# Patient Record
Sex: FEMALE | Race: WHITE | Employment: OTHER | URBAN - METROPOLITAN AREA
[De-identification: names, ages, dates, MRNs, and addresses within clinical notes are randomized per-mention and may not be internally consistent; named-entity substitution may affect disease eponyms.]

---

## 2017-05-11 ENCOUNTER — ED HISTORICAL/CONVERTED ENCOUNTER (OUTPATIENT)
Dept: OTHER | Age: 82
End: 2017-05-11

## 2017-11-15 ENCOUNTER — OP HISTORICAL/CONVERTED ENCOUNTER (OUTPATIENT)
Dept: OTHER | Age: 82
End: 2017-11-15

## 2018-07-03 ENCOUNTER — OP HISTORICAL/CONVERTED ENCOUNTER (OUTPATIENT)
Dept: OTHER | Age: 83
End: 2018-07-03

## 2018-12-29 ENCOUNTER — ED HISTORICAL/CONVERTED ENCOUNTER (OUTPATIENT)
Dept: OTHER | Age: 83
End: 2018-12-29

## 2019-02-07 ENCOUNTER — OP HISTORICAL/CONVERTED ENCOUNTER (OUTPATIENT)
Dept: OTHER | Age: 84
End: 2019-02-07

## 2019-07-17 ENCOUNTER — ED HISTORICAL/CONVERTED ENCOUNTER (OUTPATIENT)
Dept: OTHER | Age: 84
End: 2019-07-17

## 2019-08-09 ENCOUNTER — IP HISTORICAL/CONVERTED ENCOUNTER (OUTPATIENT)
Dept: OTHER | Age: 84
End: 2019-08-09

## 2019-09-03 ENCOUNTER — OP HISTORICAL/CONVERTED ENCOUNTER (OUTPATIENT)
Dept: OTHER | Age: 84
End: 2019-09-03

## 2019-12-29 ENCOUNTER — ED HISTORICAL/CONVERTED ENCOUNTER (OUTPATIENT)
Dept: OTHER | Age: 84
End: 2019-12-29

## 2020-11-19 ENCOUNTER — HOSPITAL ENCOUNTER (OUTPATIENT)
Dept: WOUND CARE | Age: 85
Discharge: HOME | End: 2020-11-19
Payer: MEDICARE

## 2020-11-19 VITALS
SYSTOLIC BLOOD PRESSURE: 121 MMHG | RESPIRATION RATE: 18 BRPM | WEIGHT: 110 LBS | BODY MASS INDEX: 21.6 KG/M2 | DIASTOLIC BLOOD PRESSURE: 56 MMHG | HEIGHT: 60 IN | TEMPERATURE: 98.3 F | HEART RATE: 72 BPM

## 2020-11-19 PROBLEM — S61.401A OPEN WOUND OF RIGHT HAND: Status: ACTIVE | Noted: 2020-11-19

## 2020-11-19 PROBLEM — S61.250A: Status: ACTIVE | Noted: 2020-11-19

## 2020-11-19 PROBLEM — I10 BENIGN ESSENTIAL HYPERTENSION: Status: ACTIVE | Noted: 2020-11-19

## 2020-11-19 PROCEDURE — 11042 DBRDMT SUBQ TIS 1ST 20SQCM/<: CPT

## 2020-11-19 PROCEDURE — 99204 OFFICE O/P NEW MOD 45 MIN: CPT

## 2020-11-19 RX ORDER — ASPIRIN 325 MG
325 TABLET ORAL DAILY
COMMUNITY

## 2020-11-19 RX ORDER — AMLODIPINE BESYLATE 5 MG/1
5 TABLET ORAL DAILY
COMMUNITY
End: 2021-06-14

## 2020-11-19 RX ORDER — NAPROXEN SODIUM 220 MG
220 TABLET ORAL 2 TIMES DAILY WITH MEALS
COMMUNITY
End: 2021-05-22

## 2020-11-19 RX ORDER — LIDOCAINE HYDROCHLORIDE 20 MG/ML
15 SOLUTION OROPHARYNGEAL AS NEEDED
Status: DISCONTINUED | OUTPATIENT
Start: 2020-11-19 | End: 2020-11-21 | Stop reason: HOSPADM

## 2020-11-19 NOTE — PROGRESS NOTES
[address]   Progress Note and Procedure Note     [address] [facility]  RECORD NUMBER:  [id number]  AGE: 80 y.o. RACE WHITE OR   GENDER: female  : 1935  EPISODE DATE:  2020    Collaborating Physician: Sierra Palomo MD     Subjective: Patient states she was trying to  the food on the floor that had spilled and her dog bit her. She states she went to [facility] and had surgery by Dr. Nicolasa Goodell about 3 weeks ago. She states she has been improving. She has no other complaints at this time. Chief Complaint   Patient presents with    Wound Check     Right 2nd finger         HISTORY of PRESENT ILLNESS HPI    Dara Goodwin is a 80 y.o. female who presents today for wound/ulcer evaluation. History of Wound Context: dog bit of the right index finger  Wound/Ulcer Pain Timing/Severity: none  Quality of pain: none  Severity:  0 / 10   Modifying Factors: None  Associated Signs/Symptoms: stiffness    Ulcer Identification:  Ulcer Type: traumatic    Contributing Factors: none    Wound: to the right index finger         PAST MEDICAL HISTORY    Past Medical History:   Diagnosis Date    Arthritis     GERD (gastroesophageal reflux disease)     Hypertension     Other ill-defined conditions(799.89)     Diverticulitis    Psychiatric disorder     depression        PAST SURGICAL HISTORY    Past Surgical History:   Procedure Laterality Date    HX GI  2010    Colon resection    HX GYN  1973    Hysterectomy    HX HEENT  2013    Laser eye surgery on left    HX ORTHOPAEDIC  1995    Left rotator cuff surgery    HX ORTHOPAEDIC      right 2nd finger       FAMILY HISTORY    Family History   Problem Relation Age of Onset    Lung Disease Mother     Diabetes Sister     Heart Disease Sister        SOCIAL HISTORY    Social History     Tobacco Use    Smoking status: Former Smoker    Smokeless tobacco: Former User   Substance Use Topics    Alcohol use: No    Drug use:  No ALLERGIES    Allergies   Allergen Reactions    Penicillin G Anaphylaxis    Codeine Other (comments)     I can't walk and don't have control over my body       MEDICATIONS    Current Outpatient Medications on File Prior to Encounter   Medication Sig Dispense Refill    amLODIPine (NORVASC) 5 mg tablet Take 5 mg by mouth daily.  naproxen sodium (Aleve) 220 mg tablet Take 220 mg by mouth two (2) times daily (with meals).  aspirin (aspirin) 325 mg tablet Take 325 mg by mouth daily.  traZODone (DESYREL) 100 mg tablet Take 100 mg by mouth nightly.  cholecalciferol, vitamin D3, (VITAMIN D3) 2,000 unit Tab Take 1 Tab by mouth daily. No current facility-administered medications on file prior to encounter. REVIEW OF SYSTEMS    Pertinent items are noted in HPI. Objective:     Visit Vitals  BP (!) 121/56 (BP 1 Location: Left arm, BP Patient Position: At rest;Sitting)   Pulse 72   Temp 98.3 °F (36.8 °C)   Resp 18   Ht 5' (1.524 m)   Wt 49.9 kg (110 lb)   BMI 21.48 kg/m²       Wt Readings from Last 3 Encounters:   11/19/20 49.9 kg (110 lb)   03/31/15 68.9 kg (152 lb)   05/01/13 70.3 kg (155 lb)       PHYSICAL EXAM    Constitutional: Patient is awake, ambulating with no devices, no acute distress  Head: normocephalic, atraumatic. Musculoskeletal: swelling and stiffness noted to the right index finger. Limited DIP and PIP flexion. Wound: to the dorsal aspect of the right index finger with minimal slough no drainage, no odor, mild periwoud maceration. Sutures in place from surgical intervention.   Behavioral/Psych: patient is pleasant, cooperative, awake and alert    Assessment:        Problem List Items Addressed This Visit     None          Debridement Wound Care        Problem List Items Addressed This Visit     None          Procedure Note  Indications:  Based on my examination of this patient's wound(s)/ulcer(s) today, debridement is required to promote healing and evaluate the wound base. Performed by: Soni Ortiz PA-C    Consent obtained: Yes    Time out taken: Yes    Debridement: Excisional    Using curette and forceps the wound(s)/ulcer(s) was/were sharply debrided down through and including the removal of    epidermis, dermis and subcutaneous tissue    Devitalized Tissue Debrided: fibrin, biofilm and slough    Pre Debridement Measurements:  Are located in the Wound/Ulcer Documentation Flow Sheet    Wound/Ulcer #: 1    Post Debridement Measurements:  Wound/Ulcer Descriptions are Pre Debridement except measurements:Non-Pressure ulcer, fat layer exposed    Wound Finger (Comment which one) Right #1 11/19/20 (Active)   Wound Etiology Traumatic 11/19/20 1326   Dressing Status Clean;Dry; Intact 11/19/20 1326   Cleansed Cleansed with saline 11/19/20 1326   Wound Length (cm) 0.3 cm 11/19/20 1326   Wound Width (cm) 0.3 cm 11/19/20 1326   Wound Depth (cm) 0.1 cm 11/19/20 1326   Wound Surface Area (cm^2) 0.09 cm^2 11/19/20 1326   Wound Volume (cm^3) 0.01 cm^3 11/19/20 1326   Post-Procedure Length (cm) 0.4 cm 11/19/20 1357   Post-Procedure Width (cm) 0.4 cm 11/19/20 1357   Post-Procedure Depth (cm) 0.1 cm 11/19/20 1357   Post-Procedure Surface Area (cm^2) 0.16 cm^2 11/19/20 1357   Post-Procedure Volume (cm^3) 0.02 cm^3 11/19/20 1357   Wound Assessment Epithelialization;Granulation tissue;Slough 11/19/20 1326   Drainage Amount Small 11/19/20 1326   Drainage Description Serosanguinous 11/19/20 1326   Wound Odor None 11/19/20 1326   Selena-Wound/Incision Assessment Dry/flaky; Intact 11/19/20 1326   Edges Defined edges 11/19/20 1326   Wound Thickness Description Full thickness 11/19/20 1326   Number of days: 0        Percent of Wound(s)/Ulcer(s) Debrided: 100%    Total Surface Area Debrided:  0.16 sq cm     Diabetic/Pressure/Non Pressure Ulcers only:  Ulcer:     Estimated Blood Loss:  Minimal     Hemostasis Achieved: Pressure    Procedural Pain: 0 / 10     Post Procedural Pain: 0 / 10     Response to treatment: Well tolerated by patient     Plan:     Patient was instructed to work on ROM of the finger. She states home health has been coming out. She states she has had a physical therapist come out but not an occupational therapist. I instructed patient and her grand daughter on some exercises to do for ROm.        Return Appointment:  []? Dressing supply provider:   [x]? Home Healthcare: [facility]  [x]? Return Appointment: 2 Week(s)  []? Nurse Visit:   Week(s)  []? Discharge from [facility]     []? Ordered tests:   []? Referrals:      Wound Cleansing:   Do not scrub or use excessive force. Cleanse wound prior to applying a clean dressing with:  [x]? Normal Saline          [x]? Mild Soap & Water    []? Keep Wound Dry in Shower  []? Other:       Dressings:                  Wound Location right 2ND finger    [x]? Apply Primary Dressing:                                          []? MediHoney Gel         []? MediHoney Alginate                     []? Calcium Alginate      []? Calcium Alginate with Silver              []? Collagen                   []? Collagen with Silver                []? Santyl with Moistened saline gauze              []? Hydrofera Blue (cut to size and moistened)  []? Hydrofera Blue Ready (Cut to size)              []? NS wet to dry            []? Betadine wet to dry              []? Hydrogel                   []? Mepitel                   []? Bactroban/Mupirocin                       [x]? Other:  XEROFORM  []? Pack wound loosely with   []? Iodoform   []? Plain Packing       []? Other:      [x]? Cover and Secure with:                   [x]? Gauze        []? Chales Lout           []? Kerlix              []? Foam          []? Super Absorbant    []? ABD                                      []? ACE Wrap            []? Other:               Avoid contact of tape with skin.     [x]? Change dressing:  [x]? Daily           []? Every Other Day    []? Once per week              []? Three times per week        []?  Do Not Change Dressing    []? Other:                                Dietary:  []? Diet as tolerated:   []? Calorie Diabetic Diet:         []? No Added Salt:  [x]? Increase Protein:   []? Other:              Activity:  [x]? Activity as tolerated:    []? Patient has no activity restrictions       []? Strict Bedrest:          []? Remain off Work:       []? May return to full duty work:                                               []? Return to work with restrictions:                  Treatment Note please see attached Discharge Instructions    Written patient dismissal instructions given to patient or POA.          Electronically signed by Dora Hurst PA-C on 11/19/2020 at 2:13 PM

## 2020-11-19 NOTE — WOUND CARE
Discharge Instructions for [address] Telephone: [phone] NAME:  Jonathon Martinez YOB: 1935 MEDICAL RECORD NUMBER:  [id number] DATE:  11/19/2020 Return Appointment: 
[] Dressing supply provider:  
[x] Home Healthcare: [facility] [x] Return Appointment: 2 Week(s) 
[] Nurse Visit:   Week(s) 
[] Discharge from [facility] [] Ordered tests:  
[] Referrals:  
 
Wound Cleansing: Do not scrub or use excessive force. Cleanse wound prior to applying a clean dressing with: 
[x] Normal Saline  
[x] Mild Soap & Water   
[] Keep Wound Dry in Shower 
[] Other:   
 
Topical Treatments: Do not apply lotions, creams, or ointments to wound bed unless directed. [] Apply moisturizing lotion to skin surrounding the wound prior to dressing change. 
[] Apply antifungal ointment to skin surrounding the wound prior to dressing change. 
[] Apply thin film of moisture barrier ointment to skin immediately around wound. [] Other:   
  
Dressings:           Wound Location right 2ND finger  
[x] Apply Primary Dressing:     
 [] MediHoney Gel    [] MediHoney Alginate  
            [] Calcium Alginate      [] Calcium Alginate with Silver 
 [] Collagen                   [] Collagen with Silver   
            [] Santyl with Moistened saline gauze 
            [] Hydrofera Blue (cut to size and moistened)  [] Hydrofera Blue Ready (Cut to size) [] NS wet to dry    [] Betadine wet to dry 
            [] Hydrogel                [] Mepitel   
 [] Bactroban/Mupirocin  
            [x] Other:  XEROFORM [] Pack wound loosely with  [] Iodoform   [] Plain Packing       [] Other:  
 
[x] Cover and Secure with:   
 [x] Gauze [] Suzanne [] Kerlix [] Foam [] Super Absorbant [] ABD [] ACE Wrap            [] Other:  
 Avoid contact of tape with skin.  
 
[x] Change dressing: [x] Daily    [] Every Other Day [] Once per week 
 [] Three times per week [] Do Not Change Dressing   [] Other: 
  
 
Negative Pressure:           Wound Location:  
[] Pressure @           mm/Hg  []Continuous []Intermittent 
 [] Black  [] White Foam 
            [] Bridge:   
 
 
Pressure Relief: 
[] When sitting, shift position or do seat lifts every 15 minutes. 
[] Wheelchair cushion [] Specialty Bed/Mattress 
[] Turn every 2 hours when in bed. Avoid position directing pressure on wound site. Limit side lying to 30 degree tilt. Limit HOB elevation to 30 degrees. Edema Control: 
Apply: [] Compression Stocking []Right Leg []Left Leg 
 [] Tubigrip []Right Leg Double Layer []Left Leg Double Layer []Right Leg Single Layer []Left Leg Single Layer 
 
 [] Elevate leg(s) above the level of the heart when sitting. [] Avoid prolonged standing in one place. Compression: 
Apply: [] Multilayer Compression Wrap: []RightLeg []Left Leg 
                               []Profore  []Profore Lite             []Unna 
 
 [] Do not get leg(s) with wrap wet. If wraps become too tight call the center or completely remove the wrap. [] Elevate leg(s) above the level of the heart when sitting. [] Avoid prolonged standing in one place. Off-Loading:  
[] Off-loading when [] walking  [] in bed [] sitting 
[] Total non-weight bearing  [] Right Leg  [] Left Leg  
[] Assistive Device [] Walker [] Cane      [] Wheelchair  [] Crutches 
 [] Surgical shoe    [] Podus Boot(s)   [] Foam Boot(s)  [] Roll About 
  [] Cast Boot [] CROW Boot  [] Other: 
 
Contact Cast: Apply: [] Total Contact Cast Applied in Clinic []RightLeg []Left Leg 
 [] Do not get cast wet. Contact center or go to emergency room if there is a foul odor or becomes uncomfortable due to feeling tight or swelling. Do not use objects inside of cast to scratch.    
 
Dietary: 
[] Diet as tolerated: [] Calorie Diabetic Diet: [] No Added Salt: 
[x] Increase Protein: [] Other:  
 
Activity: 
[x] Activity as tolerated:   
[] Patient has no activity restrictions [] Strict Bedrest:  
[] Remain off Work:      
[] May return to full duty work:                                    
[] Return to work with restrictions:  
         
 
 
 
[address] Information: Should you experience any significant changes in your wound(s) or have questions about your wound care, please contact Alix Carrasco at [phone] 8:00 am - 4:30. If you need help with your wound outside of these hours and cannot wait until we are again available, contact your PCP or go to the hospital emergency room. PLEASE NOTE: IF YOU ARE UNABLE TO OBTAIN WOUND SUPPLIES, CONTINUE TO USE THE SUPPLIES YOU HAVE AVAILABLE UNTIL YOU ARE ABLE TO REACH US. IT IS MOST IMPORTANT TO KEEP THE WOUND COVERED AT ALL TIMES. [] Dr. Avril Darling  
[] Dr. Jeffrey Rodriguez [x] [facility] [] Dr. Trena Pinto

## 2020-11-19 NOTE — DISCHARGE INSTRUCTIONS
Discharge Instructions for  [facility], [address] [place] [address]  Telephone: [phone]     NAME:  Manda Razo OF BIRTH:  1935  MEDICAL RECORD NUMBER:  [id number]  DATE:  11/19/2020        Return Appointment:  []? Dressing supply provider:   [x]? Home Healthcare: [facility]  [x]? Return Appointment: 2 Week(s)  []? Nurse Visit:   Week(s)  []? Discharge from [facility]     []? Ordered tests:   []? Referrals:      Wound Cleansing:   Do not scrub or use excessive force. Cleanse wound prior to applying a clean dressing with:  [x]? Normal Saline          [x]? Mild Soap & Water    []? Keep Wound Dry in Shower  []? Other:       Topical Treatments:  Do not apply lotions, creams, or ointments to wound bed unless directed. []? Apply moisturizing lotion to skin surrounding the wound prior to dressing change. []? Apply antifungal ointment to skin surrounding the wound prior to dressing change. []? Apply thin film of moisture barrier ointment to skin immediately around wound. []? Other:                  Dressings:                  Wound Location right 2ND finger    [x]? Apply Primary Dressing:                                          []? MediHoney Gel         []? MediHoney Alginate                     []? Calcium Alginate      []? Calcium Alginate with Silver              []? Collagen                   []? Collagen with Silver                []? Santyl with Moistened saline gauze              []? Hydrofera Blue (cut to size and moistened)  []? Hydrofera Blue Ready (Cut to size)              []? NS wet to dry            []? Betadine wet to dry              []? Hydrogel                   []? Mepitel                   []? Bactroban/Mupirocin                       [x]? Other:  XEROFORM     []? Pack wound loosely with   []? Iodoform   []? Plain Packing       []? Other:      [x]? Cover and Secure with:                   [x]? Gauze        []? Beata Shed           []? Kerlix              []? Foam          []? Super Absorbant    []? ABD                                      []? ACE Wrap            []? Other:               Avoid contact of tape with skin.     [x]? Change dressing:  [x]? Daily           []? Every Other Day    []? Once per week              []? Three times per week        []? Do Not Change Dressing    []? Other:                   Negative Pressure:           Wound Location:   []? Pressure @                      mm/Hg              []?Continuous  []? Intermittent              []? Black          []? White Foam              []? Bridge:               Pressure Relief:  []? When sitting, shift position or do seat lifts every 15 minutes. []? Wheelchair cushion           []? Specialty Bed/Mattress  []? Turn every 2 hours when in bed. Avoid position directing pressure on wound site. Limit side lying to 30 degree tilt. Limit HOB elevation to 30 degrees. Edema Control:  Apply:  []? Compression Stocking      []? Right Leg     []? Left Leg              []? Tubigrip      []? Right Leg Double Layer      []? Left Leg Double Layer                                      []?Right Leg Single Layer       []? Left Leg Single Layer                 []? Elevate leg(s) above the level of the heart when sitting. []? Avoid prolonged standing in one place.         Compression:  Apply:  []? Multilayer Compression Wrap:      []? RightLeg      []? Left Leg                                 []?Profore            []? Profore Lite             []?Unna                 []? Do not get leg(s) with wrap wet. If wraps become too tight call the center or completely remove the wrap. []? Elevate leg(s) above the level of the heart when sitting. []? Avoid prolonged standing in one place.     Off-Loading:   []? Off-loading when   []? walking       []? in bed         []? sitting  []? Total non-weight bearing  []? Right Leg  []?  Left Leg []? Assistive Device    []? Marko Peek        []? Cane      []? Wheelchair      []? Crutches              []? Surgical shoe    []? Podus Boot(s)   []? Foam Boot(s)  []? Roll About              []? Cast Boot   []? CROW Boot  []? Other:     Contact Cast:  Apply:  []? Total Contact Cast Applied in Clinic          []? RightLeg      []? Left Leg              []? Do not get cast wet. Contact center or go to emergency room if there is a foul odor or becomes uncomfortable due to feeling tight or swelling. Do not use objects inside of cast to scratch.                  Dietary:  []? Diet as tolerated:   []? Calorie Diabetic Diet:         []? No Added Salt:  [x]? Increase Protein:   []? Other:              Activity:  [x]? Activity as tolerated:    []? Patient has no activity restrictions       []? Strict Bedrest:          []? Remain off Work:       []? May return to full duty work:                                               []? Return to work with restrictions:       [facility] Information: Should you experience any significant changes in your wound(s) or have questions about your wound care, please contact Alix Shi 26 at [place] [phone] 8:00 am - 4:30. If you need help with your wound outside of these hours and cannot wait until we are again available, contact your PCP or go to the hospital emergency room.      PLEASE NOTE: IF YOU ARE UNABLE TO [address], CONTINUE TO USE THE SUPPLIES YOU HAVE AVAILABLE UNTIL YOU ARE ABLE TO [facility]. IT IS MOST IMPORTANT TO KEEP THE WOUND COVERED AT ALL TIMES.     []? Dr. Radonna Fabry   []? Dr. Meño Vasquez  [x]? Fatuma Henderson [facility]  []?  Dr. Ade Perez

## 2020-12-17 ENCOUNTER — HOSPITAL ENCOUNTER (OUTPATIENT)
Dept: WOUND CARE | Age: 85
Discharge: HOME | End: 2020-12-17
Payer: MEDICARE

## 2020-12-17 VITALS
HEART RATE: 78 BPM | DIASTOLIC BLOOD PRESSURE: 66 MMHG | TEMPERATURE: 98.1 F | SYSTOLIC BLOOD PRESSURE: 119 MMHG | RESPIRATION RATE: 18 BRPM

## 2020-12-17 DIAGNOSIS — S61.250A: ICD-10-CM

## 2020-12-17 PROCEDURE — 99211 OFF/OP EST MAY X REQ PHY/QHP: CPT

## 2020-12-17 NOTE — PROGRESS NOTES
[address]   Progress Note and Procedure Note     [address] [facility]  RECORD NUMBER:  [id number]  AGE: 80 y.o. RACE WHITE OR   GENDER: female  : 1935  EPISODE DATE:  2020    Collaborating Physician: Garret Johnson MD     Subjective: Patient states she has been doing well at this time. No new complaints      Chief Complaint   Patient presents with    Wound Check     Rt hand 2nd finger         HISTORY of PRESENT ILLNESS HPI    Lila Magaña is a 80 y.o. female who presents today for wound/ulcer evaluation.    History of Wound Context: dog bit of the right index finger  Wound/Ulcer Pain Timing/Severity: none  Quality of pain: none  Severity:  0 / 10   Modifying Factors: None  Associated Signs/Symptoms: stiffness    Ulcer Identification:  Ulcer Type: traumatic    Contributing Factors: none    Wound: to the right index finger         PAST MEDICAL HISTORY    Past Medical History:   Diagnosis Date    Arthritis     GERD (gastroesophageal reflux disease)     Hypertension     Other ill-defined conditions(799.89)     Diverticulitis    Psychiatric disorder     depression        PAST SURGICAL HISTORY    Past Surgical History:   Procedure Laterality Date    HX GI  2010    Colon resection    HX GYN  1973    Hysterectomy    HX HEENT  2013    Laser eye surgery on left    HX ORTHOPAEDIC  1995    Left rotator cuff surgery    HX ORTHOPAEDIC      right 2nd finger       FAMILY HISTORY    Family History   Problem Relation Age of Onset    Lung Disease Mother     Diabetes Sister     Heart Disease Sister        SOCIAL HISTORY    Social History     Tobacco Use    Smoking status: Former Smoker    Smokeless tobacco: Former User   Substance Use Topics    Alcohol use: No    Drug use: No       ALLERGIES    Allergies   Allergen Reactions    Penicillin G Anaphylaxis    Codeine Other (comments)     I can't walk and don't have control over my body MEDICATIONS    Current Outpatient Medications on File Prior to Encounter   Medication Sig Dispense Refill    amLODIPine (NORVASC) 5 mg tablet Take 5 mg by mouth daily.  naproxen sodium (Aleve) 220 mg tablet Take 220 mg by mouth two (2) times daily (with meals).  aspirin (aspirin) 325 mg tablet Take 325 mg by mouth daily.  traZODone (DESYREL) 100 mg tablet Take 100 mg by mouth nightly.  cholecalciferol, vitamin D3, (VITAMIN D3) 2,000 unit Tab Take 1 Tab by mouth daily. No current facility-administered medications on file prior to encounter. REVIEW OF SYSTEMS    Pertinent items are noted in HPI. Objective:     Visit Vitals  /66 (BP 1 Location: Left arm, BP Patient Position: Sitting)   Pulse 78   Temp 98.1 °F (36.7 °C)   Resp 18       Wt Readings from Last 3 Encounters:   11/19/20 49.9 kg (110 lb)   03/31/15 68.9 kg (152 lb)   05/01/13 70.3 kg (155 lb)       PHYSICAL EXAM    Constitutional: Patient is awake, ambulating with no devices, no acute distress  Head: normocephalic, atraumatic. Musculoskeletal: swelling and stiffness noted to the right index finger. Limited DIP and PIP flexion improving  Wound: to the dorsal aspect of the right index finger healed  Behavioral/Psych: patient is pleasant, cooperative, awake and alert    Assessment:        Problem List Items Addressed This Visit        Other    Open wound of right index finger due to animal bite          Plan:     Patient discharged from the wound healing center. She was instructed to call if any new wounds arise. Treatment Note please see attached Discharge Instructions    Written patient dismissal instructions given to patient or POA.          Electronically signed by Dalila Lawson PA-C on 12/17/2020 at 2:13 PM

## 2020-12-17 NOTE — DISCHARGE INSTRUCTIONS
Discharge Instructions for  [address] [place] [address], [address] [place] [address]  Telephone: [phone]     NAME:  Ryanne Knox OF BIRTH:  1935  MEDICAL RECORD NUMBER:  [id number]  DATE:  11/19/2020        Return Appointment:  []? Dressing supply provider:   [x]? Home Healthcare: [facility]  []? Return Appointment:  [facility])  []? Nurse Visit:   Week(s)  [x]? Discharge from [facility]     []? Ordered tests:   []? Referrals:      Wound Cleansing:   Do not scrub or use excessive force. Cleanse wound prior to applying a clean dressing with:  []? Normal Saline          []? Mild Soap & Water    []? Keep Wound Dry in Shower  []? Other:       Topical Treatments:  Do not apply lotions, creams, or ointments to wound bed unless directed. []? Apply moisturizing lotion to skin surrounding the wound prior to dressing change. []? Apply antifungal ointment to skin surrounding the wound prior to dressing change. []? Apply thin film of moisture barrier ointment to skin immediately around wound. []? Other:                  Dressings:                  Wound Location right 2ND finger    []? Apply Primary Dressing:                                          []? MediHoney Gel         []? MediHoney Alginate                     []? Calcium Alginate      []? Calcium Alginate with Silver              []? Collagen                   []? Collagen with Silver                []? Santyl with Moistened saline gauze              []? Hydrofera Blue (cut to size and moistened)  []? Hydrofera Blue Ready (Cut to size)              []? NS wet to dry            []? Betadine wet to dry              []? Hydrogel                   []? Mepitel                   []? Bactroban/Mupirocin                       []? Other:  XEROFORM  []? Pack wound loosely with   []? Iodoform   []? Plain Packing       []? Other:      []? Cover and Secure with:                   []? Gauze        []?  Pantera Ardon []? Kerlix              []? Foam          []? Super Absorbant    []? ABD                                      []? ACE Wrap            []? Other:               Avoid contact of tape with skin.     []? Change dressing:  []? Daily           []? Every Other Day    []? Once per week              []? Three times per week        []? Do Not Change Dressing    []? Other:                   Negative Pressure:           Wound Location:   []? Pressure @                      mm/Hg              []?Continuous  []? Intermittent              []? Black          []? White Foam              []? Bridge:               Pressure Relief:  []? When sitting, shift position or do seat lifts every 15 minutes. []? Wheelchair cushion           []? Specialty Bed/Mattress  []? Turn every 2 hours when in bed. Avoid position directing pressure on wound site. Limit side lying to 30 degree tilt. Limit HOB elevation to 30 degrees. Edema Control:  Apply:  []? Compression Stocking      []? Right Leg     []? Left Leg              []? Tubigrip      []? Right Leg Double Layer      []? Left Leg Double Layer                                      []?Right Leg Single Layer       []? Left Leg Single Layer                 []? Elevate leg(s) above the level of the heart when sitting. []? Avoid prolonged standing in one place.         Compression:  Apply:  []? Multilayer Compression Wrap:      []? RightLeg      []? Left Leg                                 []?Profore            []? Profore Lite             []?Unna                 []? Do not get leg(s) with wrap wet. If wraps become too tight call the center or completely remove the wrap. []? Elevate leg(s) above the level of the heart when sitting. []? Avoid prolonged standing in one place.     Off-Loading:   []? Off-loading when   []? walking       []? in bed         []? sitting  []? Total non-weight bearing  []? Right Leg  []? Left Leg         []?  Assistive Device    []? Scharlene Natalee        []? Cane      []? Wheelchair      []? Crutches              []? Surgical shoe    []? Podus Boot(s)   []? Foam Boot(s)  []? Roll About              []? Cast Boot   []? CROW Boot  []? Other:     Contact Cast:  Apply:  []? Total Contact Cast Applied in Clinic          []? RightLeg      []? Left Leg              []? Do not get cast wet. Contact center or go to emergency room if there is a foul odor or becomes uncomfortable due to feeling tight or swelling. Do not use objects inside of cast to scratch.                  Dietary:  []? Diet as tolerated:   []? Calorie Diabetic Diet:         []? No Added Salt:  []? Increase Protein:   []? Other:              Activity:  []? Activity as tolerated:    []? Patient has no activity restrictions       []? Strict Bedrest:          []? Remain off Work:       []? May return to full duty work:                                               []? Return to work with restrictions:      [address] [place] [address] Information: Should you experience any significant changes in your wound(s) or have questions about your wound care, please contact Alix Shi 26 at [place] [address] 8:00 am - 4:30. If you need help with your wound outside of these hours and cannot wait until we are again available, contact your PCP or go to the hospital emergency room.      PLEASE NOTE: IF YOU ARE UNABLE TO [address], CONTINUE TO USE THE SUPPLIES YOU HAVE AVAILABLE UNTIL YOU ARE ABLE TO [facility]. IT IS MOST IMPORTANT TO KEEP THE WOUND COVERED AT ALL TIMES.     []? Dr. Shukri Crabtree   []? Dr. Adiel Mejia  [x]? [facility]  []?  Dr. Katie Irby

## 2020-12-17 NOTE — WOUND CARE
Discharge Instructions for [address] Telephone: [phone] NAME:  Simeon Chong YOB: 1935 MEDICAL RECORD NUMBER:  [id number] DATE:  11/19/2020 Return Appointment: 
[] Dressing supply provider:  
[x] Home Healthcare: [facility] [] Return Appointment:  [facility]) 
[] Nurse Visit:   Week(s) [x] Discharge from [facility] [] Ordered tests:  
[] Referrals:  
 
Wound Cleansing: Do not scrub or use excessive force. Cleanse wound prior to applying a clean dressing with: 
[] Normal Saline [] Mild Soap & Water   
[] Keep Wound Dry in Shower 
[] Other:   
 
Topical Treatments: Do not apply lotions, creams, or ointments to wound bed unless directed. [] Apply moisturizing lotion to skin surrounding the wound prior to dressing change. 
[] Apply antifungal ointment to skin surrounding the wound prior to dressing change. 
[] Apply thin film of moisture barrier ointment to skin immediately around wound. [] Other:   
  
Dressings:           Wound Location right 2ND finger  
[] Apply Primary Dressing:     
 [] MediHoney Gel    [] MediHoney Alginate  
            [] Calcium Alginate      [] Calcium Alginate with Silver 
 [] Collagen                   [] Collagen with Silver   
            [] Santyl with Moistened saline gauze 
            [] Hydrofera Blue (cut to size and moistened)  [] Hydrofera Blue Ready (Cut to size) [] NS wet to dry    [] Betadine wet to dry 
            [] Hydrogel                [] Mepitel   
 [] Bactroban/Mupirocin  
            [] Other:  XEROFORM [] Pack wound loosely with  [] Iodoform   [] Plain Packing       [] Other:  
 
[] Cover and Secure with:   
 [] Gauze [] Suzanne [] Kerlix [] Foam [] Super Absorbant [] ABD [] ACE Wrap            [] Other:  
 Avoid contact of tape with skin.  
 
[] Change dressing: [] Daily    [] Every Other Day [] Once per week 
 [] Three times per week [] Do Not Change Dressing   [] Other: 
  
 
Negative Pressure:           Wound Location:  
[] Pressure @           mm/Hg  []Continuous []Intermittent 
 [] Black  [] White Foam 
            [] Bridge:   
 
 
Pressure Relief: 
[] When sitting, shift position or do seat lifts every 15 minutes. 
[] Wheelchair cushion [] Specialty Bed/Mattress 
[] Turn every 2 hours when in bed. Avoid position directing pressure on wound site. Limit side lying to 30 degree tilt. Limit HOB elevation to 30 degrees. Edema Control: 
Apply: [] Compression Stocking []Right Leg []Left Leg 
 [] Tubigrip []Right Leg Double Layer []Left Leg Double Layer []Right Leg Single Layer []Left Leg Single Layer 
 
 [] Elevate leg(s) above the level of the heart when sitting. [] Avoid prolonged standing in one place. Compression: 
Apply: [] Multilayer Compression Wrap: []RightLeg []Left Leg 
                               []Profore  []Profore Lite             []Unna 
 
 [] Do not get leg(s) with wrap wet. If wraps become too tight call the center or completely remove the wrap. [] Elevate leg(s) above the level of the heart when sitting. [] Avoid prolonged standing in one place. Off-Loading:  
[] Off-loading when [] walking  [] in bed [] sitting 
[] Total non-weight bearing  [] Right Leg  [] Left Leg  
[] Assistive Device [] Walker [] Cane      [] Wheelchair  [] Crutches 
 [] Surgical shoe    [] Podus Boot(s)   [] Foam Boot(s)  [] Roll About 
  [] Cast Boot [] CROW Boot  [] Other: 
 
Contact Cast: Apply: [] Total Contact Cast Applied in Clinic []RightLeg []Left Leg 
 [] Do not get cast wet. Contact center or go to emergency room if there is a foul odor or becomes uncomfortable due to feeling tight or swelling. Do not use objects inside of cast to scratch.    
 
Dietary: 
[] Diet as tolerated: [] Calorie Diabetic Diet: [] No Added Salt: 
[] Increase Protein: [] Other:  
 
Activity: 
[] Activity as tolerated: [] Patient has no activity restrictions [] Strict Bedrest:  
[] Remain off Work:      
[] May return to full duty work:                                    
[] Return to work with restrictions:  
         
 
 
 
[address] Information: Should you experience any significant changes in your wound(s) or have questions about your wound care, please contact Alix Carrasco at [place] [address] 8:00 am - 4:30. If you need help with your wound outside of these hours and cannot wait until we are again available, contact your PCP or go to the hospital emergency room. PLEASE NOTE: IF YOU ARE UNABLE TO OBTAIN WOUND SUPPLIES, CONTINUE TO USE THE SUPPLIES YOU HAVE AVAILABLE UNTIL YOU ARE ABLE TO REACH US. IT IS MOST IMPORTANT TO KEEP THE WOUND COVERED AT ALL TIMES. [] Dr. Nir Alvares  
[] Dr. Jose M Ly [x] [facility] [] Dr. Justin Palacio

## 2021-05-10 ENCOUNTER — APPOINTMENT (OUTPATIENT)
Dept: GENERAL RADIOLOGY | Age: 86
End: 2021-05-10
Attending: FAMILY MEDICINE
Payer: MEDICARE

## 2021-05-10 ENCOUNTER — HOSPITAL ENCOUNTER (EMERGENCY)
Age: 86
Discharge: HOME | End: 2021-05-10
Attending: FAMILY MEDICINE
Payer: MEDICARE

## 2021-05-10 VITALS
HEART RATE: 108 BPM | OXYGEN SATURATION: 98 % | DIASTOLIC BLOOD PRESSURE: 99 MMHG | SYSTOLIC BLOOD PRESSURE: 168 MMHG | WEIGHT: 110 LBS | TEMPERATURE: 98.5 F | RESPIRATION RATE: 16 BRPM | BODY MASS INDEX: 20.77 KG/M2 | HEIGHT: 61 IN

## 2021-05-10 DIAGNOSIS — T17.908A ASPIRATION INTO AIRWAY, INITIAL ENCOUNTER: ICD-10-CM

## 2021-05-10 DIAGNOSIS — R05.9 COUGH: Primary | ICD-10-CM

## 2021-05-10 PROCEDURE — 70360 X-RAY EXAM OF NECK: CPT

## 2021-05-10 PROCEDURE — 99283 EMERGENCY DEPT VISIT LOW MDM: CPT

## 2021-05-10 PROCEDURE — 71046 X-RAY EXAM CHEST 2 VIEWS: CPT

## 2021-05-11 NOTE — DISCHARGE INSTRUCTIONS
Thank you! Thank you for allowing me to care for you in the emergency department. I sincerely hope that you are satisfied with your visit today. It is my goal to provide you with excellent care. Below you will find a list of your labs and imaging from your visit today. Should you have any questions regarding these results please do not hesitate to call the emergency department. Labs -   No results found for this or any previous visit (from the past 12 hour(s)). Radiologic Studies -   XR NECK SOFT TISSUE   Final Result   No acute findings. XR CHEST PA LAT   Final Result   No acute findings. CT Results  (Last 48 hours)      None          CXR Results  (Last 48 hours)                 05/10/21 2126  XR CHEST PA LAT Final result    Impression:  No acute findings. Narrative:  2 views of the chest 9:26 PM.         INDICATION: Aspirated. Heart size is within normal limits with a tortuous atherosclerotic aorta. No   gross infiltrate or effusion. Degenerative changes of the spine. No   pneumothorax. If you feel that you have not received excellent quality care or timely care, please ask to speak to the nurse manager. Please choose us in the future for your continued health care needs. ------------------------------------------------------------------------------------------------------------  The exam and treatment you received in the Emergency Department were for an urgent problem and are not intended as complete care. It is important that you follow-up with a doctor, nurse practitioner, or physician assistant to:  (1) confirm your diagnosis,  (2) re-evaluation of changes in your illness and treatment, and  (3) for ongoing care. If your symptoms become worse or you do not improve as expected and you are unable to reach your usual health care provider, you should return to the Emergency Department. We are available 24 hours a day.      Please take your discharge instructions with you when you go to your follow-up appointment. If you have any problem arranging a follow-up appointment, contact the Emergency Department immediately. If a prescription has been provided, please have it filled as soon as possible to prevent a delay in treatment. Read the entire medication instruction sheet provided to you by the pharmacy. If you have any questions or reservations about taking the medication due to side effects or interactions with other medications, please call your primary care physician or contact the ER to speak with the charge nurse. Make an appointment with your family doctor or the physician you were referred to for follow-up of this visit as instructed on your discharge paperwork, as this is a mandatory follow-up. Return to the ER if you are unable to be seen or if you are unable to be seen in a timely manner. If you have any problem arranging the follow-up visit, contact the Emergency Department immediately.

## 2021-05-11 NOTE — ED PROVIDER NOTES
EMERGENCY DEPARTMENT HISTORY AND PHYSICAL EXAM      Date: 5/10/2021  Patient Name: Taft Peabody    History of Presenting Illness     Chief Complaint   Patient presents with    Cough       History Provided By:     HPI: Taft Peabody, is an extremely pleasant 80 y.o. female presenting to the ED with a chief complaint of coughing after choking on a small sip of water. Patient states prior to arrival she was in her bathroom when a \"small sip of water went down went down the wrong tube. \"  Patient states she has been intermittently coughing since. Upon arrival to the emergency department she denies any chest pain or shortness of breath. She states the cough is actually getting better. She denies bringing anything up with the cough. She denies any history of stroke nor difficulty swallowing. She denies other concerns. There are no other complaints, changes, or physical findings at this time. PCP: David Razo MD    No current facility-administered medications on file prior to encounter. Current Outpatient Medications on File Prior to Encounter   Medication Sig Dispense Refill    amLODIPine (NORVASC) 5 mg tablet Take 5 mg by mouth daily.  naproxen sodium (Aleve) 220 mg tablet Take 220 mg by mouth two (2) times daily (with meals).  aspirin (aspirin) 325 mg tablet Take 325 mg by mouth daily.  traZODone (DESYREL) 100 mg tablet Take 100 mg by mouth nightly.  cholecalciferol, vitamin D3, (VITAMIN D3) 2,000 unit Tab Take 1 Tab by mouth daily.          Past History     Past Medical History:  Past Medical History:   Diagnosis Date    Arthritis     GERD (gastroesophageal reflux disease)     Hypertension     Other ill-defined conditions(799.89)     Diverticulitis    Psychiatric disorder     depression       Past Surgical History:  Past Surgical History:   Procedure Laterality Date    HX GI  2010    Colon resection    HX GYN  1973    Hysterectomy    HX HEENT  2013    Laser eye surgery on left    HX ORTHOPAEDIC  1995    Left rotator cuff surgery    HX ORTHOPAEDIC      right 2nd finger       Family History:  Family History   Problem Relation Age of Onset    Lung Disease Mother     Diabetes Sister     Heart Disease Sister        Social History:  Social History     Tobacco Use    Smoking status: Former Smoker    Smokeless tobacco: Former User   Substance Use Topics    Alcohol use: No    Drug use: No       Allergies: Allergies   Allergen Reactions    Penicillin G Anaphylaxis    Codeine Other (comments)     I can't walk and don't have control over my body         Review of Systems     Review of Systems   Constitutional: Negative for activity change, appetite change, chills, fatigue and fever. HENT: Negative for congestion and sore throat. Eyes: Negative for photophobia and visual disturbance. Respiratory: Positive for cough. Negative for shortness of breath and wheezing. Cardiovascular: Negative for chest pain, palpitations and leg swelling. Gastrointestinal: Negative for abdominal pain, diarrhea, nausea and vomiting. Endocrine: Negative for cold intolerance and heat intolerance. Musculoskeletal: Negative for gait problem and joint swelling. Skin: Negative for color change and rash. Neurological: Negative for dizziness and headaches. Physical Exam     Physical Exam  Constitutional:       Appearance: She is well-developed. HENT:      Head: Normocephalic and atraumatic. Mouth/Throat:      Mouth: Mucous membranes are moist.      Pharynx: Oropharynx is clear. Eyes:      Conjunctiva/sclera: Conjunctivae normal.      Pupils: Pupils are equal, round, and reactive to light. Neck:      Musculoskeletal: Normal range of motion and neck supple. Cardiovascular:      Rate and Rhythm: Normal rate and regular rhythm. Heart sounds: No murmur. Pulmonary:      Breath sounds: No stridor. No wheezing, rhonchi or rales.       Comments: Occasional dry cough, no increased work of breathing, no respiratory distress  Abdominal:      General: There is no distension. Tenderness: There is no abdominal tenderness. There is no rebound. Skin:     General: Skin is warm and dry. Neurological:      General: No focal deficit present. Mental Status: She is alert and oriented to person, place, and time. Psychiatric:         Mood and Affect: Mood normal.         Behavior: Behavior normal.         Lab and Diagnostic Study Results     Labs -   No results found for this or any previous visit (from the past 12 hour(s)). Radiologic Studies -   @lastxrresult@  CT Results  (Last 48 hours)    None        CXR Results  (Last 48 hours)               05/10/21 2126  XR CHEST PA LAT Final result    Impression:  No acute findings. Narrative:  2 views of the chest 9:26 PM.         INDICATION: Aspirated. Heart size is within normal limits with a tortuous atherosclerotic aorta. No   gross infiltrate or effusion. Degenerative changes of the spine. No   pneumothorax. Medical Decision Making   - I am the first provider for this patient. - I reviewed the vital signs, available nursing notes, past medical history, past surgical history, family history and social history. - Initial assessment performed. The patients presenting problems have been discussed, and they are in agreement with the care plan formulated and outlined with them. I have encouraged them to ask questions as they arise throughout their visit. Vital Signs-Reviewed the patient's vital signs. Patient Vitals for the past 12 hrs:   Temp Pulse Resp BP SpO2   05/10/21 2102    (!) 168/99    05/10/21 2055 98.5 °F (36.9 °C) (!) 108 16 (!) 178/123 98 %         ED Course/ Provider Notes (Medical Decision Making):     Patient presented to the emergency department with a chief complaint of coughing  after \"small sip of water went down the wrong tube. \"  In the emergency department patient is comfortable, nondistressed, occasional dry cough. Initially tachycardic at 108 bpm however this resolves within minutes of being in the emergency department. Chest x-ray and neck soft tissue chest x-ray demonstrate no acute abnormality. Patient continued to improve. At the time of discharge her cough had almost nearly resolved. She was given strict return precautions. She will follow up with her PCP. Lizandro Cervantes was given a thorough list of signs and symptoms that would warrant an immediate return to the emergency department. Otherwise Lizandro Cervantes will follow up with PCP. Procedures   Medical Decision Makingedical Decision Making  Performed by: Elijah Handy DO  Procedures  None       Disposition   Disposition:     Home    All of the diagnostic tests were reviewed and questions answered. Diagnosis, care plan and treatment options were discussed. The patient understands the instructions and will follow up as directed. The patients results have been reviewed with them. They have been counseled regarding their diagnosis. The patient verbally convey understanding and agreement of the signs, symptoms, diagnosis, treatment and prognosis and additionally agrees to follow up as recommended with their PCP in 24 - 48 hours. They also agree with the care-plan and convey that all of their questions have been answered. I have also put together some discharge instructions for them that include: 1) educational information regarding their diagnosis, 2) how to care for their diagnosis at home, as well a 3) list of reasons why they would want to return to the ED prior to their follow-up appointment, should their condition change. DISCHARGE PLAN:    1. Current Discharge Medication List      CONTINUE these medications which have NOT CHANGED    Details   amLODIPine (NORVASC) 5 mg tablet Take 5 mg by mouth daily.       naproxen sodium (Aleve) 220 mg tablet Take 220 mg by mouth two (2) times daily (with meals). aspirin (aspirin) 325 mg tablet Take 325 mg by mouth daily. traZODone (DESYREL) 100 mg tablet Take 100 mg by mouth nightly. cholecalciferol, vitamin D3, (VITAMIN D3) 2,000 unit Tab Take 1 Tab by mouth daily. 2.   Follow-up Information     Follow up With Specialties Details Why Contact Info    Concepcion Naranjo MD Geriatric Medicine Schedule an appointment as soon as possible for a visit   [address] [place] [address] Road  [phone]              3.  Return to ED if worse       4. Current Discharge Medication List            Diagnosis     Clinical Impression:    1. Cough    2. Aspiration into airway, initial encounter        Attestations:    Martha Daniel, DO    Please note that this dictation was completed with [employer], the computer voice recognition software. Quite often unanticipated grammatical, syntax, homophones, and other interpretive errors are inadvertently transcribed by the computer software. Please disregard these errors. Please excuse any errors that have escaped final proofreading. Thank you.

## 2021-05-11 NOTE — ED NOTES
[facility] lino Schwartz [phone] is available for ride home, however pt's daughter in law is now here for ride instead.

## 2021-05-22 ENCOUNTER — HOSPITAL ENCOUNTER (EMERGENCY)
Age: 86
Discharge: HOME | End: 2021-05-22
Attending: EMERGENCY MEDICINE
Payer: MEDICARE

## 2021-05-22 ENCOUNTER — APPOINTMENT (OUTPATIENT)
Dept: GENERAL RADIOLOGY | Age: 86
End: 2021-05-22
Attending: EMERGENCY MEDICINE
Payer: MEDICARE

## 2021-05-22 VITALS
TEMPERATURE: 98.4 F | BODY MASS INDEX: 20.62 KG/M2 | OXYGEN SATURATION: 99 % | SYSTOLIC BLOOD PRESSURE: 171 MMHG | HEIGHT: 60 IN | WEIGHT: 105 LBS | RESPIRATION RATE: 22 BRPM | DIASTOLIC BLOOD PRESSURE: 70 MMHG | HEART RATE: 79 BPM

## 2021-05-22 DIAGNOSIS — F45.8 HYPERVENTILATION SYNDROME: ICD-10-CM

## 2021-05-22 DIAGNOSIS — F41.0 PANIC ATTACK: Primary | ICD-10-CM

## 2021-05-22 PROCEDURE — 71045 X-RAY EXAM CHEST 1 VIEW: CPT

## 2021-05-22 PROCEDURE — 93005 ELECTROCARDIOGRAM TRACING: CPT

## 2021-05-22 PROCEDURE — 99284 EMERGENCY DEPT VISIT MOD MDM: CPT

## 2021-05-22 RX ORDER — LOSARTAN POTASSIUM 25 MG/1
50 TABLET ORAL DAILY
COMMUNITY
End: 2021-06-14
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
## 2021-05-22 NOTE — ED TRIAGE NOTES
Patient reports she walked to and from the kitchen and became sob when she sat down on the sofa reports that she has been having these episodes since her son and  past away but have been getting worse

## 2021-05-24 LAB
ATRIAL RATE: 68 BPM
CALCULATED P AXIS, ECG09: 34 DEGREES
CALCULATED R AXIS, ECG10: 20 DEGREES
CALCULATED T AXIS, ECG11: 71 DEGREES
DIAGNOSIS, 93000: NORMAL
P-R INTERVAL, ECG05: 178 MS
Q-T INTERVAL, ECG07: 394 MS
QRS DURATION, ECG06: 68 MS
QTC CALCULATION (BEZET), ECG08: 418 MS
VENTRICULAR RATE, ECG03: 68 BPM

## 2021-06-01 ENCOUNTER — APPOINTMENT (OUTPATIENT)
Dept: CT IMAGING | Age: 86
End: 2021-06-01
Attending: FAMILY MEDICINE
Payer: MEDICARE

## 2021-06-01 ENCOUNTER — HOSPITAL ENCOUNTER (EMERGENCY)
Age: 86
Discharge: HOME | End: 2021-06-01
Attending: FAMILY MEDICINE
Payer: MEDICARE

## 2021-06-01 VITALS
OXYGEN SATURATION: 98 % | TEMPERATURE: 98 F | RESPIRATION RATE: 18 BRPM | DIASTOLIC BLOOD PRESSURE: 52 MMHG | SYSTOLIC BLOOD PRESSURE: 112 MMHG | WEIGHT: 105 LBS | BODY MASS INDEX: 20.62 KG/M2 | HEIGHT: 60 IN | HEART RATE: 83 BPM
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
## 2021-06-01 DIAGNOSIS — I71.21 THORACIC ASCENDING AORTIC ANEURYSM: ICD-10-CM

## 2021-06-01 DIAGNOSIS — S41.112A SKIN TEAR OF LEFT UPPER ARM WITHOUT COMPLICATION, INITIAL ENCOUNTER: ICD-10-CM

## 2021-06-01 DIAGNOSIS — S09.90XA CLOSED HEAD INJURY, INITIAL ENCOUNTER: ICD-10-CM

## 2021-06-01 DIAGNOSIS — E27.8 ADRENAL NODULE (HCC): ICD-10-CM

## 2021-06-01 DIAGNOSIS — S00.83XA FACIAL HEMATOMA, INITIAL ENCOUNTER: ICD-10-CM

## 2021-06-01 LAB
ALBUMIN SERPL-MCNC: 3.6 G/DL (ref 3.5–5)
ALBUMIN/GLOB SERPL: 1 {RATIO} (ref 1.1–2.2)
ALP SERPL-CCNC: 71 U/L (ref 45–117)
ALT SERPL-CCNC: 21 U/L (ref 12–78)
ANION GAP SERPL CALC-SCNC: 8 MMOL/L (ref 5–15)
APPEARANCE UR: ABNORMAL
AST SERPL W P-5'-P-CCNC: 26 U/L (ref 15–37)
BACTERIA URNS QL MICRO: NEGATIVE /HPF
BASOPHILS # BLD: 0 K/UL (ref 0–0.1)
BASOPHILS NFR BLD: 0 % (ref 0–1)
BILIRUB SERPL-MCNC: 0.5 MG/DL (ref 0.2–1)
BILIRUB UR QL: NEGATIVE
BUN SERPL-MCNC: 25 MG/DL (ref 6–20)
BUN/CREAT SERPL: 21 (ref 12–20)
CA-I BLD-MCNC: 9.2 MG/DL (ref 8.5–10.1)
CHLORIDE SERPL-SCNC: 99 MMOL/L (ref 97–108)
CK SERPL-CCNC: 148 U/L (ref 26–192)
CO2 SERPL-SCNC: 29 MMOL/L (ref 21–32)
COLOR UR: YELLOW
CREAT SERPL-MCNC: 1.19 MG/DL (ref 0.55–1.02)
DIFFERENTIAL METHOD BLD: ABNORMAL
EOSINOPHIL # BLD: 0.1 K/UL (ref 0–0.4)
EOSINOPHIL NFR BLD: 1 % (ref 0–7)
EPITH CASTS URNS QL MICRO: NORMAL /LPF
ERYTHROCYTE [DISTWIDTH] IN BLOOD BY AUTOMATED COUNT: 13.3 % (ref 11.5–14.5)
GLOBULIN SER CALC-MCNC: 3.7 G/DL (ref 2–4)
GLUCOSE SERPL-MCNC: 126 MG/DL (ref 65–100)
GLUCOSE UR STRIP.AUTO-MCNC: NEGATIVE MG/DL
HCT VFR BLD AUTO: 32.3 % (ref 35–47)
HGB BLD-MCNC: 11.2 G/DL (ref 11.5–16)
HGB UR QL STRIP: ABNORMAL
IMM GRANULOCYTES # BLD AUTO: 0 K/UL (ref 0–0.04)
IMM GRANULOCYTES NFR BLD AUTO: 0 % (ref 0–0.5)
KETONES UR QL STRIP.AUTO: NEGATIVE MG/DL
LEUKOCYTE ESTERASE UR QL STRIP.AUTO: ABNORMAL
LYMPHOCYTES # BLD: 1 K/UL (ref 0.8–3.5)
LYMPHOCYTES NFR BLD: 9 % (ref 12–49)
MCH RBC QN AUTO: 31.3 PG (ref 26–34)
MCHC RBC AUTO-ENTMCNC: 34.7 G/DL (ref 30–36.5)
MCV RBC AUTO: 90.2 FL (ref 80–99)
MONOCYTES # BLD: 1.3 K/UL (ref 0–1)
MONOCYTES NFR BLD: 11 % (ref 5–13)
NEUTS SEG # BLD: 9 K/UL (ref 1.8–8)
NEUTS SEG NFR BLD: 79 % (ref 32–75)
NITRITE UR QL STRIP.AUTO: NEGATIVE
PH UR STRIP: 5 [PH] (ref 5–8)
PLATELET # BLD AUTO: 204 K/UL (ref 150–400)
PMV BLD AUTO: 11 FL (ref 8.9–12.9)
POTASSIUM SERPL-SCNC: 4.2 MMOL/L (ref 3.5–5.1)
PROT SERPL-MCNC: 7.3 G/DL (ref 6.4–8.2)
PROT UR STRIP-MCNC: ABNORMAL MG/DL
RBC # BLD AUTO: 3.58 M/UL (ref 3.8–5.2)
RBC #/AREA URNS HPF: NORMAL /HPF (ref 0–5)
SODIUM SERPL-SCNC: 136 MMOL/L (ref 136–145)
SP GR UR REFRACTOMETRY: 1.01 (ref 1–1.03)
TROPONIN I SERPL-MCNC: <0.05 NG/ML
UROBILINOGEN UR QL STRIP.AUTO: 0.1 EU/DL (ref 0.2–1)
WBC # BLD AUTO: 11.4 K/UL (ref 3.6–11)
WBC URNS QL MICRO: NORMAL /HPF (ref 0–4)

## 2021-06-01 PROCEDURE — 80053 COMPREHEN METABOLIC PANEL: CPT

## 2021-06-01 PROCEDURE — 72125 CT NECK SPINE W/O DYE: CPT

## 2021-06-01 PROCEDURE — 81001 URINALYSIS AUTO W/SCOPE: CPT

## 2021-06-01 PROCEDURE — 82550 ASSAY OF CK (CPK): CPT

## 2021-06-01 PROCEDURE — 71250 CT THORAX DX C-: CPT

## 2021-06-01 PROCEDURE — 74176 CT ABD & PELVIS W/O CONTRAST: CPT

## 2021-06-01 PROCEDURE — 85025 COMPLETE CBC W/AUTO DIFF WBC: CPT

## 2021-06-01 PROCEDURE — 99284 EMERGENCY DEPT VISIT MOD MDM: CPT

## 2021-06-01 PROCEDURE — 84484 ASSAY OF TROPONIN QUANT: CPT

## 2021-06-01 PROCEDURE — 36415 COLL VENOUS BLD VENIPUNCTURE: CPT

## 2021-06-01 PROCEDURE — 70486 CT MAXILLOFACIAL W/O DYE: CPT

## 2021-06-01 PROCEDURE — 70450 CT HEAD/BRAIN W/O DYE: CPT

## 2021-06-01 PROCEDURE — 93005 ELECTROCARDIOGRAM TRACING: CPT

## 2021-06-01 NOTE — ED TRIAGE NOTES
Pt fell down stairs at some point last night, called granddaughter @ [id number]; skin tears to left arm, abrasion to right knee, and raised bruise over left eye    Pt ambulated with the assistance of a cane.  C-collar placed on pt upon arrival to [facility]; granddaughter at bedside

## 2021-06-01 NOTE — ED PROVIDER NOTES
EMERGENCY DEPARTMENT HISTORY AND PHYSICAL EXAM      Date: 6/1/2021  Patient Name: Bailee Briscoe    History of Presenting Illness     Chief Complaint   Patient presents with    Fall       History Provided By:     HPI: Bailee Briscoe, is an extremely pleasant 80 y.o. female presenting to the ED with a chief complaint of fall down 13 steps. Patient is a poor historian. She lives alone. She called her daughter prior to arrival and states she thinks she fell down the steps. Patient does not recall the events leading up to this. She states she woke up and she was at the bottom of the steps. She states she hurts all over. She endorses diffuse back pain. She states her left forehead hurts. She endorses pain over her the distribution of the skin tears of her left arm. She endorses mild abdominal pain but states she has been having diarrhea lately. She denies any chest pain, shortness of breath, dizziness, confusion. No fevers, chills or rigors. There are no other complaints, changes, or physical findings at this time. PCP: Ger Hobbs MD    No current facility-administered medications on file prior to encounter. Current Outpatient Medications on File Prior to Encounter   Medication Sig Dispense Refill    losartan (COZAAR) 25 mg tablet Take 50 mg by mouth daily.  amLODIPine (NORVASC) 5 mg tablet Take 5 mg by mouth daily.  aspirin (aspirin) 325 mg tablet Take 325 mg by mouth daily.  traZODone (DESYREL) 100 mg tablet Take 100 mg by mouth nightly.  cholecalciferol, vitamin D3, (VITAMIN D3) 2,000 unit Tab Take 1 Tab by mouth daily.          Past History     Past Medical History:  Past Medical History:   Diagnosis Date    Arthritis     GERD (gastroesophageal reflux disease)     Hypertension     Other ill-defined conditions(959.89)     Diverticulitis    Psychiatric disorder     depression       Past Surgical History:  Past Surgical History:   Procedure Laterality Date    HX GI  2010    Colon resection    HX GYN  1973    Hysterectomy    HX HEENT  2013    Laser eye surgery on left    HX ORTHOPAEDIC  1995    Left rotator cuff surgery    HX ORTHOPAEDIC      right 2nd finger       Family History:  Family History   Problem Relation Age of Onset    Lung Disease Mother     Diabetes Sister     Heart Disease Sister        Social History:  Social History     Tobacco Use    Smoking status: Former Smoker    Smokeless tobacco: Former User   Substance Use Topics    Alcohol use: No    Drug use: No       Allergies: Allergies   Allergen Reactions    Penicillin G Anaphylaxis    Codeine Other (comments)     I can't walk and don't have control over my body         Review of Systems     Review of Systems   Constitutional: Negative for activity change, appetite change, chills, fatigue and fever. HENT: Negative for congestion and sore throat. Eyes: Negative for photophobia and visual disturbance. Respiratory: Negative for cough, shortness of breath and wheezing. Cardiovascular: Negative for chest pain, palpitations and leg swelling. Gastrointestinal: Negative for abdominal pain, diarrhea, nausea and vomiting. Endocrine: Negative for cold intolerance and heat intolerance. Musculoskeletal: Negative for gait problem and joint swelling. See HPI   Skin: Negative for color change and rash. Neurological: Negative for dizziness and headaches. Physical Exam     Physical Exam  Constitutional:       Appearance: She is well-developed. HENT:      Head: Normocephalic and atraumatic. Mouth/Throat:      Mouth: Mucous membranes are moist.      Pharynx: Oropharynx is clear. Eyes:      Conjunctiva/sclera: Conjunctivae normal.      Pupils: Pupils are equal, round, and reactive to light. Cardiovascular:      Rate and Rhythm: Normal rate and regular rhythm. Heart sounds: No murmur heard. Pulmonary:      Effort: No respiratory distress. Breath sounds: No stridor. No wheezing, rhonchi or rales. Abdominal:      General: There is no distension. Tenderness: There is no abdominal tenderness. There is no rebound. Comments: Mild epigastric tenderness with palpation, no rebound, no guarding   Musculoskeletal:      Cervical back: Normal range of motion and neck supple. Comments: Difficulty localizing pain over neck and back as patient states it hurts along entire length of cervical thoracic and lumbar spine. Tenderness to palpation over the left forehead. No tenderness to palpation along the anterior chest wall. Pelvis is stable and nontender. Skin:     General: Skin is warm and dry. Comments: 3 skin tears of left arm    2 x 2 centimeter hematoma left eyebrow   Neurological:      General: No focal deficit present. Mental Status: She is alert and oriented to person, place, and time. Psychiatric:         Mood and Affect: Mood normal.         Behavior: Behavior normal.         Lab and Diagnostic Study Results     Labs -     Recent Results (from the past 12 hour(s))   CBC WITH AUTOMATED DIFF    Collection Time: 06/01/21  7:00 AM   Result Value Ref Range    WBC 11.4 (H) 3.6 - 11.0 K/uL    RBC 3.58 (L) 3.80 - 5.20 M/uL    HGB 11.2 (L) 11.5 - 16.0 g/dL    HCT 32.3 (L) 35.0 - 47.0 %    MCV 90.2 80.0 - 99.0 FL    MCH 31.3 26.0 - 34.0 PG    MCHC 34.7 30.0 - 36.5 g/dL    RDW 13.3 11.5 - 14.5 %    PLATELET 061 025 - 337 K/uL    MPV 11.0 8.9 - 12.9 FL    NEUTROPHILS 79 (H) 32 - 75 %    LYMPHOCYTES 9 (L) 12 - 49 %    MONOCYTES 11 5 - 13 %    EOSINOPHILS 1 0 - 7 %    BASOPHILS 0 0 - 1 %    IMMATURE GRANULOCYTES 0 0.0 - 0.5 %    ABS. NEUTROPHILS 9.0 (H) 1.8 - 8.0 K/UL    ABS. LYMPHOCYTES 1.0 0.8 - 3.5 K/UL    ABS. MONOCYTES 1.3 (H) 0.0 - 1.0 K/UL    ABS. EOSINOPHILS 0.1 0.0 - 0.4 K/UL    ABS. BASOPHILS 0.0 0.0 - 0.1 K/UL    ABS. IMM.  GRANS. 0.0 0.00 - 0.04 K/UL    DF AUTOMATED     METABOLIC PANEL, COMPREHENSIVE    Collection Time: 06/01/21  7:00 AM   Result Value Ref Range    Sodium 136 136 - 145 mmol/L    Potassium 4.2 3.5 - 5.1 mmol/L    Chloride 99 97 - 108 mmol/L    CO2 29 21 - 32 mmol/L    Anion gap 8 5 - 15 mmol/L    Glucose 126 (H) 65 - 100 mg/dL    BUN 25 (H) 6 - 20 mg/dL    Creatinine 1.19 (H) 0.55 - 1.02 mg/dL    BUN/Creatinine ratio 21 (H) 12 - 20      GFR est AA 52 (L) >60 ml/min/1.73m2    GFR est non-AA 43 (L) >60 ml/min/1.73m2    Calcium 9.2 8.5 - 10.1 mg/dL    Bilirubin, total 0.5 0.2 - 1.0 mg/dL    AST (SGOT) 26 15 - 37 U/L    ALT (SGPT) 21 12 - 78 U/L    Alk. phosphatase 71 45 - 117 U/L    Protein, total 7.3 6.4 - 8.2 g/dL    Albumin 3.6 3.5 - 5.0 g/dL    Globulin 3.7 2.0 - 4.0 g/dL    A-G Ratio 1.0 (L) 1.1 - 2.2     TROPONIN I    Collection Time: 06/01/21  7:00 AM   Result Value Ref Range    Troponin-I, Qt. <0.05 <0.05 ng/mL   CK    Collection Time: 06/01/21  7:00 AM   Result Value Ref Range     26 - 192 U/L   URINALYSIS W/ RFLX MICROSCOPIC    Collection Time: 06/01/21  7:50 AM   Result Value Ref Range    Color Yellow      Appearance Hazy (A) Clear      Specific gravity 1.015 1.003 - 1.030      pH (UA) 5.0 5.0 - 8.0      Protein Trace (A) Negative mg/dL    Glucose Negative Negative mg/dL    Ketone Negative Negative mg/dL    Bilirubin Negative Negative      Blood Small (A) Negative      Urobilinogen 0.1 (L) 0.2 - 1.0 EU/dL    Nitrites Negative Negative      Leukocyte Esterase Large (A) Negative     URINE MICROSCOPIC    Collection Time: 06/01/21  7:50 AM   Result Value Ref Range    WBC 5-10 0 - 4 /hpf    RBC 5-10 0 - 5 /hpf    Epithelial cells Few Few /lpf    Bacteria Negative Negative /hpf       Radiologic Studies -   @lastxrresult@  CT Results  (Last 48 hours)               06/01/21 0638  CT ABD PELV WO CONT Final result    Impression:  Nonspecific 2.3 cm left adrenal nodule. Correlate any clinical concern for metastatic disease. Prior bowel surgery. No ascites.                Narrative:  CT abdomen and pelvis without IV contrast.       CT chest reported separately. Axial images are reviewed along with reformatted sagittal/coronal images. No IV   contrast administered. Dose reduction: All CT scans at this facility are performed using dose reduction   optimization techniques as appropriate to a performed exam including the   following-   automated exposure control, adjustments of mA and/or Kv according to patient   size, or use of iterative reconstructive technique. Nonenhanced images demonstrate an unremarkable appearance for the liver. Mild   distention of the gallbladder. Normal volume spleen. Pancreas appears   unremarkable on this nonenhanced study. Right adrenal gland unremarkable. Left adrenal gland with abnormal 2.3 cm left adrenal nodule (HU 12). Bilateral kidneys appears unremarkable on this nonenhanced study. Evidence for prior bowel surgery; surgical suture line sigmoid colon. Colonic   diverticulosis. No bowel obstruction. No ascites. Atherosclerosis noted normal caliber abdominal aorta with extension to pelvic   arteries. Lumbar spondylosis. No lumbar vertebral body compression deformity. SI joints   and hip joints intact. No pelvic fracture. 06/01/21 6292  CT CHEST WO CONT Final result    Impression:  Nonspecific subcentimeter nodules scattered throughout the lungs. Correlate any clinical concern for metastatic disease. At minimum, consider 3   month CT chest follow-up to demonstrate stability. 4 cm  ascending thoracic aortic aneurysm. Thoracic aorta atherosclerosis. CAD. Narrative:  CT chest without IV contrast.       Axial images are reviewed along with reformatted sagittal/coronal/MIP images. No   IV contrast administered.     Dose reduction: All CT scans at this facility are performed using dose reduction   optimization techniques as appropriate to a performed exam including the   following-   automated exposure control, adjustments of mA and/or Kv according to patient size, or use of iterative reconstructive technique. .       Scattered nonspecific subcentimeter nodules. Nodules are 5 mm or less. No   pneumothorax or pleural effusion. Nonenhanced images reveal atherosclerotic change thoracic aorta. 4 cm ascending   thoracic aortic aneurysm. Normal caliber pulmonary arterial trunk. Aortic and   mitral valvular calcification. Coronary artery disease. No pericardial effusion. Atherosclerosis continues into the imaged upper abdominal aorta. Normal alignment thoracic vertebral column. No thoracic vertebral body   compression deformity. Sternum intact. No displaced rib fracture. Sternoclavicular joints and shoulder joints appear intact. 06/01/21 2186  CT SPINE CERV WO CONT Final result    Impression:  Cervical spondylosis. No CT evidence for fracture or malalignment of   the cervical spine. Nonspecific 4 mm RUL nodule could be followed for stability. Narrative:  CT cervical spine. Axial images are reviewed along with reformatted sagittal/coronal/3-D MIP   images. Dose reduction: All CT scans at this facility are performed using dose reduction   optimization techniques as appropriate to a performed exam including the   following-   automated exposure control, adjustments of mA and/or Kv according to patient   size, or use of iterative reconstructive technique. There is normal alignment of the cervical vertebral column. Disc space narrowing   with endplate sclerosis and anterior osteophyte formation. Uncovertebral/facet   hypertrophic change. Facet are normally aligned. No prevertebral soft tissue   swelling. Coronal images reveal normal C1-C2 relation. 4 mm RUL nodule could be followed for stability. Otherwise apical lungs are   clear. Carotid artery atherosclerosis. 06/01/21 4258  CT MAXILLOFACIAL WO CONT Final result    Impression:  No CT evidence for facial bone fracture. Narrative:  CT facial bones.        Axial images are reviewed along with reformatted sagittal/coronal images. Dose reduction: All CT scans at this facility are performed using dose reduction   optimization techniques as appropriate to a performed exam including the   following-   automated exposure control, adjustments of mA and/or Kv according to patient   size, or use of iterative reconstructive technique. .       Sinuses are normally aerated. Nasal bones intact. Zygomatic arches intact. Coronal images reveal orbital margins are intact; no orbital wall fracture. Sagittal images demonstrate mandibular condyles are normally positioned. No   mandible fracture. Imaged upper cervical spine with hypertrophic bone   atlantoaxial joint; otherwise C1-C2 interval intact. Soft tissue axial images   reveal prior ophthalmologic surgery. No superficial radiopaque foreign material   evident; however, clinical inspection could follow. 06/01/21 0637  CT HEAD WO CONT Final result    Impression:      No acute intracranial bleed. Atherosclerosis. Follow-up as clinically indicated. Narrative:  CT head without contrast       Dose reduction: All CT scans at this facility are performed using dose reduction   optimization techniques as appropriate to a performed exam including the   following: Automated exposure control, adjustments of the mA and/or kV according   to patient size, or use of iterative reconstruction technique. INDICATION: Fall       FINDINGS:       No acute intracranial bleed or midline shift. Lateral ventricles are slightly   prominent. Possible mild white matter small vessel ischemic changes. MRI is more   sensitive for evaluation of brain parenchyma. Partially empty sella. No skull   fracture. No fluid in the paranasal sinuses or mastoid air cells. Atherosclerosis. CXR Results  (Last 48 hours)    None            Medical Decision Making   - I am the first provider for this patient.   - I reviewed the vital signs, available nursing notes, past medical history, past surgical history, family history and social history. - Initial assessment performed. The patients presenting problems have been discussed, and they are in agreement with the care plan formulated and outlined with them. I have encouraged them to ask questions as they arise throughout their visit. Vital Signs-Reviewed the patient's vital signs. Patient Vitals for the past 12 hrs:   Temp Pulse Resp BP SpO2   06/01/21 0722  83  (!) 112/52 98 %   06/01/21 0615 98 °F (36.7 °C) 90 18 (!) 100/55 99 %         ED Course/ Provider Notes (Medical Decision Making):     Patient presented to the emergency department with a chief complaint of waking up at the bottom of 13 steps with facial pain and back pain. Patient is a poor historian and denies any recollection of the events leading up to her being on the ground. She is not sure how long she was lying there for. Patient is GCS 15, nontoxic, alert and oriented x3. Blood pressure 100/55, temperature 98, heart rate 90, respirations 18, oxygen saturation 99% on room air. CT scan head, neck, maxillofacial bones, chest, abdomen and pelvis demonstrate showed no acute abnormality. CT chest does demonstrate incidental \"Nonspecific subcentimeter nodules scattered throughout the lungs. Correlate any clinical concern for metastatic disease. At minimum, consider 3  month CT chest follow-up to demonstrate stability. \"  CT abdomen pelvis notable for incidental \"Nonspecific 2.3 cm left adrenal nodule. Correlate any clinical concern for metastatic disease. Nonspecific 2.3 cm left adrenal nodule. Correlate any clinical concern for metastatic disease. \" these findings were discussed in detail with the patient and her granddaughter. They will follow up on this with her PCP. Overall laboratory work was without any marked abnormality. Troponin less than 0.05. EKG normal sinus rhythm without STEMI.   Urinalysis negative for infection. Given her fall without any clear etiology I did offer her admission. Both patient and her caregiver at the bedside state they have a safety plan in place. They would like to go home. They declined admission. They are given strict return precautions. Will follow up with PCP. Venancio Diaz was given a thorough list of signs and symptoms that would warrant an immediate return to the emergency department. Otherwise Venancio Diaz will follow up with PCP. Procedures   Medical Decision Makingedical Decision Making  Performed by: Reilly Shen DO  Procedures  None       Disposition   Disposition:     Home     All of the diagnostic tests were reviewed and questions answered. Diagnosis, care plan and treatment options were discussed. The patient understands the instructions and will follow up as directed. The patients results have been reviewed with them. They have been counseled regarding their diagnosis. The patient verbally convey understanding and agreement of the signs, symptoms, diagnosis, treatment and prognosis and additionally agrees to follow up as recommended with their PCP in 24 - 48 hours. They also agree with the care-plan and convey that all of their questions have been answered. I have also put together some discharge instructions for them that include: 1) educational information regarding their diagnosis, 2) how to care for their diagnosis at home, as well a 3) list of reasons why they would want to return to the ED prior to their follow-up appointment, should their condition change. DISCHARGE PLAN:    1. Current Discharge Medication List      CONTINUE these medications which have NOT CHANGED    Details   losartan (COZAAR) 25 mg tablet Take 50 mg by mouth daily. amLODIPine (NORVASC) 5 mg tablet Take 5 mg by mouth daily. aspirin (aspirin) 325 mg tablet Take 325 mg by mouth daily.       traZODone (DESYREL) 100 mg tablet Take 100 mg by mouth nightly. cholecalciferol, vitamin D3, (VITAMIN D3) 2,000 unit Tab Take 1 Tab by mouth daily. 2.   Follow-up Information     Follow up With Specialties Details Why Contact Info    Amanda Alicea MD Geriatric Medicine In 1 day for re-evaluation [id number] [facility] [facility] [address]      [address] [facility] Emergency Medicine  As needed, If symptoms worsen or new symptoms arise [address] [place] [id number] [phone]            3. Return to ED if worse       4. Current Discharge Medication List            Diagnosis     Clinical Impression:   1. Fall, initial encounter    2. Closed head injury, initial encounter    3. Facial hematoma, initial encounter    4. Skin tear of left upper arm without complication, initial encounter    5. Thoracic ascending aortic aneurysm (HCC)    6. Lung nodule < 6cm on CT    7. Adrenal nodule [facility])        Attestations:    Margarita Wood, DO    Please note that this dictation was completed with [employer], the computer voice recognition software. Quite often unanticipated grammatical, syntax, homophones, and other interpretive errors are inadvertently transcribed by the computer software. Please disregard these errors. Please excuse any errors that have escaped final proofreading. Thank you.

## 2021-06-01 NOTE — DISCHARGE INSTRUCTIONS
Thank you! Thank you for allowing me to care for you in the emergency department. I sincerely hope that you are satisfied with your visit today. It is my goal to provide you with excellent care. Below you will find a list of your labs and imaging from your visit today. Should you have any questions regarding these results please do not hesitate to call the emergency department. Labs -     Recent Results (from the past 12 hour(s))   CBC WITH AUTOMATED DIFF    Collection Time: 06/01/21  7:00 AM   Result Value Ref Range    WBC 11.4 (H) 3.6 - 11.0 K/uL    RBC 3.58 (L) 3.80 - 5.20 M/uL    HGB 11.2 (L) 11.5 - 16.0 g/dL    HCT 32.3 (L) 35.0 - 47.0 %    MCV 90.2 80.0 - 99.0 FL    MCH 31.3 26.0 - 34.0 PG    MCHC 34.7 30.0 - 36.5 g/dL    RDW 13.3 11.5 - 14.5 %    PLATELET 442 129 - 997 K/uL    MPV 11.0 8.9 - 12.9 FL    NEUTROPHILS 79 (H) 32 - 75 %    LYMPHOCYTES 9 (L) 12 - 49 %    MONOCYTES 11 5 - 13 %    EOSINOPHILS 1 0 - 7 %    BASOPHILS 0 0 - 1 %    IMMATURE GRANULOCYTES 0 0.0 - 0.5 %    ABS. NEUTROPHILS 9.0 (H) 1.8 - 8.0 K/UL    ABS. LYMPHOCYTES 1.0 0.8 - 3.5 K/UL    ABS. MONOCYTES 1.3 (H) 0.0 - 1.0 K/UL    ABS. EOSINOPHILS 0.1 0.0 - 0.4 K/UL    ABS. BASOPHILS 0.0 0.0 - 0.1 K/UL    ABS. IMM. GRANS. 0.0 0.00 - 0.04 K/UL    DF AUTOMATED     METABOLIC PANEL, COMPREHENSIVE    Collection Time: 06/01/21  7:00 AM   Result Value Ref Range    Sodium 136 136 - 145 mmol/L    Potassium 4.2 3.5 - 5.1 mmol/L    Chloride 99 97 - 108 mmol/L    CO2 29 21 - 32 mmol/L    Anion gap 8 5 - 15 mmol/L    Glucose 126 (H) 65 - 100 mg/dL    BUN 25 (H) 6 - 20 mg/dL    Creatinine 1.19 (H) 0.55 - 1.02 mg/dL    BUN/Creatinine ratio 21 (H) 12 - 20      GFR est AA 52 (L) >60 ml/min/1.73m2    GFR est non-AA 43 (L) >60 ml/min/1.73m2    Calcium 9.2 8.5 - 10.1 mg/dL    Bilirubin, total 0.5 0.2 - 1.0 mg/dL    AST (SGOT) 26 15 - 37 U/L    ALT (SGPT) 21 12 - 78 U/L    Alk.  phosphatase 71 45 - 117 U/L    Protein, total 7.3 6.4 - 8.2 g/dL    Albumin 3.6 3.5 - 5.0 g/dL    Globulin 3.7 2.0 - 4.0 g/dL    A-G Ratio 1.0 (L) 1.1 - 2.2     TROPONIN I    Collection Time: 06/01/21  7:00 AM   Result Value Ref Range    Troponin-I, Qt. <0.05 <0.05 ng/mL   CK    Collection Time: 06/01/21  7:00 AM   Result Value Ref Range     26 - 192 U/L   URINALYSIS W/ RFLX MICROSCOPIC    Collection Time: 06/01/21  7:50 AM   Result Value Ref Range    Color Yellow      Appearance Hazy (A) Clear      Specific gravity 1.015 1.003 - 1.030      pH (UA) 5.0 5.0 - 8.0      Protein Trace (A) Negative mg/dL    Glucose Negative Negative mg/dL    Ketone Negative Negative mg/dL    Bilirubin Negative Negative      Blood Small (A) Negative      Urobilinogen 0.1 (L) 0.2 - 1.0 EU/dL    Nitrites Negative Negative      Leukocyte Esterase Large (A) Negative     URINE MICROSCOPIC    Collection Time: 06/01/21  7:50 AM   Result Value Ref Range    WBC 5-10 0 - 4 /hpf    RBC 5-10 0 - 5 /hpf    Epithelial cells Few Few /lpf    Bacteria Negative Negative /hpf       Radiologic Studies -   CT ABD PELV WO CONT   Final Result   Nonspecific 2.3 cm left adrenal nodule. Correlate any clinical concern for metastatic disease. Prior bowel surgery. No ascites. CT CHEST WO CONT   Final Result   Nonspecific subcentimeter nodules scattered throughout the lungs. Correlate any clinical concern for metastatic disease. At minimum, consider 3   month CT chest follow-up to demonstrate stability. 4 cm  ascending thoracic aortic aneurysm. Thoracic aorta atherosclerosis. CAD. CT SPINE CERV WO CONT   Final Result   Cervical spondylosis. No CT evidence for fracture or malalignment of   the cervical spine. Nonspecific 4 mm RUL nodule could be followed for stability. CT MAXILLOFACIAL WO CONT   Final Result   No CT evidence for facial bone fracture. CT HEAD WO CONT   Final Result      No acute intracranial bleed. Atherosclerosis. Follow-up as clinically indicated.         CT Results  (Last 48 hours)                 06/01/21 0638  CT ABD PELV WO CONT Final result    Impression:  Nonspecific 2.3 cm left adrenal nodule. Correlate any clinical concern for metastatic disease. Prior bowel surgery. No ascites. Narrative:  CT abdomen and pelvis without IV contrast.       CT chest reported separately. Axial images are reviewed along with reformatted sagittal/coronal images. No IV   contrast administered. Dose reduction: All CT scans at this facility are performed using dose reduction   optimization techniques as appropriate to a performed exam including the   following-   automated exposure control, adjustments of mA and/or Kv according to patient   size, or use of iterative reconstructive technique. Nonenhanced images demonstrate an unremarkable appearance for the liver. Mild   distention of the gallbladder. Normal volume spleen. Pancreas appears   unremarkable on this nonenhanced study. Right adrenal gland unremarkable. Left adrenal gland with abnormal 2.3 cm left adrenal nodule (HU 12). Bilateral kidneys appears unremarkable on this nonenhanced study. Evidence for prior bowel surgery; surgical suture line sigmoid colon. Colonic   diverticulosis. No bowel obstruction. No ascites. Atherosclerosis noted normal caliber abdominal aorta with extension to pelvic   arteries. Lumbar spondylosis. No lumbar vertebral body compression deformity. SI joints   and hip joints intact. No pelvic fracture. 06/01/21 1326  CT CHEST WO CONT Final result    Impression:  Nonspecific subcentimeter nodules scattered throughout the lungs. Correlate any clinical concern for metastatic disease. At minimum, consider 3   month CT chest follow-up to demonstrate stability. 4 cm  ascending thoracic aortic aneurysm. Thoracic aorta atherosclerosis. CAD.        Narrative:  CT chest without IV contrast.       Axial images are reviewed along with reformatted sagittal/coronal/MIP images. No   IV contrast administered. Dose reduction: All CT scans at this facility are performed using dose reduction   optimization techniques as appropriate to a performed exam including the   following-   automated exposure control, adjustments of mA and/or Kv according to patient   size, or use of iterative reconstructive technique. .       Scattered nonspecific subcentimeter nodules. Nodules are 5 mm or less. No   pneumothorax or pleural effusion. Nonenhanced images reveal atherosclerotic change thoracic aorta. 4 cm ascending   thoracic aortic aneurysm. Normal caliber pulmonary arterial trunk. Aortic and   mitral valvular calcification. Coronary artery disease. No pericardial effusion. Atherosclerosis continues into the imaged upper abdominal aorta. Normal alignment thoracic vertebral column. No thoracic vertebral body   compression deformity. Sternum intact. No displaced rib fracture. Sternoclavicular joints and shoulder joints appear intact. 06/01/21 8260  CT SPINE CERV WO CONT Final result    Impression:  Cervical spondylosis. No CT evidence for fracture or malalignment of   the cervical spine. Nonspecific 4 mm RUL nodule could be followed for stability. Narrative:  CT cervical spine. Axial images are reviewed along with reformatted sagittal/coronal/3-D MIP   images. Dose reduction: All CT scans at this facility are performed using dose reduction   optimization techniques as appropriate to a performed exam including the   following-   automated exposure control, adjustments of mA and/or Kv according to patient   size, or use of iterative reconstructive technique. There is normal alignment of the cervical vertebral column. Disc space narrowing   with endplate sclerosis and anterior osteophyte formation. Uncovertebral/facet   hypertrophic change. Facet are normally aligned. No prevertebral soft tissue   swelling.  Coronal images reveal normal C1-C2 relation. 4 mm RUL nodule could be followed for stability. Otherwise apical lungs are   clear. Carotid artery atherosclerosis. 06/01/21 1515  CT MAXILLOFACIAL WO CONT Final result    Impression:  No CT evidence for facial bone fracture. Narrative:  CT facial bones. Axial images are reviewed along with reformatted sagittal/coronal images. Dose reduction: All CT scans at this facility are performed using dose reduction   optimization techniques as appropriate to a performed exam including the   following-   automated exposure control, adjustments of mA and/or Kv according to patient   size, or use of iterative reconstructive technique. .       Sinuses are normally aerated. Nasal bones intact. Zygomatic arches intact. Coronal images reveal orbital margins are intact; no orbital wall fracture. Sagittal images demonstrate mandibular condyles are normally positioned. No   mandible fracture. Imaged upper cervical spine with hypertrophic bone   atlantoaxial joint; otherwise C1-C2 interval intact. Soft tissue axial images   reveal prior ophthalmologic surgery. No superficial radiopaque foreign material   evident; however, clinical inspection could follow. 06/01/21 0637  CT HEAD WO CONT Final result    Impression:      No acute intracranial bleed. Atherosclerosis. Follow-up as clinically indicated. Narrative:  CT head without contrast       Dose reduction: All CT scans at this facility are performed using dose reduction   optimization techniques as appropriate to a performed exam including the   following: Automated exposure control, adjustments of the mA and/or kV according   to patient size, or use of iterative reconstruction technique. INDICATION: Fall       FINDINGS:       No acute intracranial bleed or midline shift. Lateral ventricles are slightly   prominent. Possible mild white matter small vessel ischemic changes.  MRI is more   sensitive for evaluation of brain parenchyma. Partially empty sella. No skull   fracture. No fluid in the paranasal sinuses or mastoid air cells. Atherosclerosis. CXR Results  (Last 48 hours)      None               If you feel that you have not received excellent quality care or timely care, please ask to speak to the nurse manager. Please choose us in the future for your continued health care needs. ------------------------------------------------------------------------------------------------------------  The exam and treatment you received in the Emergency Department were for an urgent problem and are not intended as complete care. It is important that you follow-up with a doctor, nurse practitioner, or physician assistant to:  (1) confirm your diagnosis,  (2) re-evaluation of changes in your illness and treatment, and  (3) for ongoing care. If your symptoms become worse or you do not improve as expected and you are unable to reach your usual health care provider, you should return to the Emergency Department. We are available 24 hours a day. Please take your discharge instructions with you when you go to your follow-up appointment. If you have any problem arranging a follow-up appointment, contact the Emergency Department immediately. If a prescription has been provided, please have it filled as soon as possible to prevent a delay in treatment. Read the entire medication instruction sheet provided to you by the pharmacy. If you have any questions or reservations about taking the medication due to side effects or interactions with other medications, please call your primary care physician or contact the ER to speak with the charge nurse. Make an appointment with your family doctor or the physician you were referred to for follow-up of this visit as instructed on your discharge paperwork, as this is a mandatory follow-up.  Return to the ER if you are unable to be seen or if you are unable to be seen in a timely manner. If you have any problem arranging the follow-up visit, contact the Emergency Department immediately.

## 2021-06-02 ENCOUNTER — APPOINTMENT (OUTPATIENT)
Dept: GENERAL RADIOLOGY | Age: 86
End: 2021-06-02
Attending: EMERGENCY MEDICINE
Payer: MEDICARE

## 2021-06-02 ENCOUNTER — HOSPITAL ENCOUNTER (EMERGENCY)
Age: 86
Discharge: HOME | End: 2021-06-02
Attending: EMERGENCY MEDICINE
Payer: MEDICARE

## 2021-06-02 VITALS
BODY MASS INDEX: 20.62 KG/M2 | HEART RATE: 79 BPM | RESPIRATION RATE: 18 BRPM | OXYGEN SATURATION: 98 % | SYSTOLIC BLOOD PRESSURE: 138 MMHG | WEIGHT: 105 LBS | HEIGHT: 60 IN | TEMPERATURE: 97.9 F | DIASTOLIC BLOOD PRESSURE: 71 MMHG

## 2021-06-02 DIAGNOSIS — N39.0 URINARY TRACT INFECTION WITHOUT HEMATURIA, SITE UNSPECIFIED: ICD-10-CM

## 2021-06-02 DIAGNOSIS — S00.93XA CONTUSION OF HEAD, INITIAL ENCOUNTER: Primary | ICD-10-CM

## 2021-06-02 LAB
ATRIAL RATE: 82 BPM
CALCULATED P AXIS, ECG09: 70 DEGREES
CALCULATED R AXIS, ECG10: 19 DEGREES
CALCULATED T AXIS, ECG11: 75 DEGREES
DIAGNOSIS, 93000: NORMAL
P-R INTERVAL, ECG05: 172 MS
Q-T INTERVAL, ECG07: 338 MS
QRS DURATION, ECG06: 70 MS
QTC CALCULATION (BEZET), ECG08: 394 MS
VENTRICULAR RATE, ECG03: 82 BPM

## 2021-06-02 PROCEDURE — 74011250637 HC RX REV CODE- 250/637: Performed by: EMERGENCY MEDICINE

## 2021-06-02 PROCEDURE — 70250 X-RAY EXAM OF SKULL: CPT

## 2021-06-02 PROCEDURE — 99283 EMERGENCY DEPT VISIT LOW MDM: CPT

## 2021-06-02 RX ORDER — NITROFURANTOIN 25; 75 MG/1; MG/1
100 CAPSULE ORAL
Status: COMPLETED | OUTPATIENT
Start: 2021-06-02 | End: 2021-06-02

## 2021-06-02 RX ORDER — NITROFURANTOIN 25; 75 MG/1; MG/1
100 CAPSULE ORAL 2 TIMES DAILY
Qty: 14 CAPSULE | Refills: 0 | Status: SHIPPED | OUTPATIENT
Start: 2021-06-02 | End: 2021-06-14

## 2021-06-02 RX ADMIN — NITROFURANTOIN (MONOHYDRATE/MACROCRYSTALS) 100 MG: 75; 25 CAPSULE ORAL at 08:17

## 2021-06-02 NOTE — ED TRIAGE NOTES
Patient presents with daughter, daughter reports \" she fell this morning when getting up to use the bathroom and hit the back of her head, I noticed a Nyquil bottle on the kitchen table and asked her if she took it and she said she took some last night to get a good nights sleep \"

## 2021-06-02 NOTE — ED PROVIDER NOTES
EMERGENCY DEPARTMENT HISTORY AND PHYSICAL EXAM      Date: 6/2/2021  Patient Name: Annamary Goodpasture    History of Presenting Illness     Chief Complaint   Patient presents with    Fall    Head Injury       History Provided By: Patient    HPI: Annamary Goodpasture, 80 y.o. female with a past medical history significant hypertension, osteoarthritis and GERD, psychiatric disorder presents to the ED with cc of fall and and associated head contusion in the occipital region without any open lacerations. She also had a fall yesterday for which she had a full work-up by multiple CAT scans of her head and maxillofacial region and neck, CT chest and abdomen. No loss of conscious or significant occipital swelling today. No associated headache. There are no other complaints, changes, or physical findings at this time. PCP: Flower Abraham MD    No current facility-administered medications on file prior to encounter. Current Outpatient Medications on File Prior to Encounter   Medication Sig Dispense Refill    losartan (COZAAR) 25 mg tablet Take 50 mg by mouth daily.  amLODIPine (NORVASC) 5 mg tablet Take 5 mg by mouth daily.  aspirin (aspirin) 325 mg tablet Take 325 mg by mouth daily.  traZODone (DESYREL) 100 mg tablet Take 100 mg by mouth nightly.  cholecalciferol, vitamin D3, (VITAMIN D3) 2,000 unit Tab Take 1 Tab by mouth daily.          Past History     Past Medical History:  Past Medical History:   Diagnosis Date    Arthritis     GERD (gastroesophageal reflux disease)     Hypertension     Other ill-defined conditions(799.89)     Diverticulitis    Psychiatric disorder     depression       Past Surgical History:  Past Surgical History:   Procedure Laterality Date    HX GI  2010    Colon resection    HX GYN  1973    Hysterectomy    HX HEENT  2013    Laser eye surgery on left    HX ORTHOPAEDIC  1995    Left rotator cuff surgery    HX ORTHOPAEDIC      right 2nd finger       Family History:  Family History   Problem Relation Age of Onset    Lung Disease Mother     Diabetes Sister     Heart Disease Sister        Social History:  Social History     Tobacco Use    Smoking status: Former Smoker    Smokeless tobacco: Former User   Substance Use Topics    Alcohol use: No    Drug use: No       Allergies: Allergies   Allergen Reactions    Penicillin G Anaphylaxis    Codeine Other (comments)     I can't walk and don't have control over my body         Review of Systems     Review of Systems   Constitutional: Negative for diaphoresis and fatigue. HENT: Negative for congestion, dental problem, ear discharge and ear pain. Eyes: Negative for discharge and redness. Respiratory: Negative for cough, chest tightness and shortness of breath. Cardiovascular: Negative for chest pain and palpitations. Gastrointestinal: Negative for abdominal pain, constipation, diarrhea, nausea and vomiting. Endocrine: Negative. Genitourinary: Negative. Negative for dysuria and frequency. Musculoskeletal: Negative for arthralgias and myalgias. Skin: Negative. Neurological: Negative for dizziness, syncope and light-headedness. Hematological: Negative. Psychiatric/Behavioral: Negative for agitation and behavioral problems. All other systems reviewed and are negative. Physical Exam     Physical Exam  Vitals and nursing note reviewed. Constitutional:       Appearance: Normal appearance. She is normal weight. HENT:      Head: Normocephalic and atraumatic. Nose: Nose normal.      Mouth/Throat:      Mouth: Mucous membranes are moist.      Pharynx: Oropharynx is clear. Eyes:      Extraocular Movements: Extraocular movements intact. Conjunctiva/sclera: Conjunctivae normal.      Pupils: Pupils are equal, round, and reactive to light. Cardiovascular:      Rate and Rhythm: Normal rate and regular rhythm. Pulses: Normal pulses. Heart sounds: Normal heart sounds. Pulmonary:      Effort: Pulmonary effort is normal.      Breath sounds: Normal breath sounds. Abdominal:      General: Abdomen is flat. Palpations: Abdomen is soft. Musculoskeletal:         General: Normal range of motion. Cervical back: Normal range of motion and neck supple. Skin:     General: Skin is warm and dry. Capillary Refill: Capillary refill takes less than 2 seconds. Neurological:      General: No focal deficit present. Mental Status: She is alert and oriented to person, place, and time. Psychiatric:         Mood and Affect: Mood normal.         Behavior: Behavior normal.         Lab and Diagnostic Study Results     Labs -   No results found for this or any previous visit (from the past 12 hour(s)). Radiologic Studies -     CT Results  (Last 48 hours)               06/01/21 0638  CT ABD PELV WO CONT Final result    Impression:  Nonspecific 2.3 cm left adrenal nodule. Correlate any clinical concern for metastatic disease. Prior bowel surgery. No ascites. Narrative:  CT abdomen and pelvis without IV contrast.       CT chest reported separately. Axial images are reviewed along with reformatted sagittal/coronal images. No IV   contrast administered. Dose reduction: All CT scans at this facility are performed using dose reduction   optimization techniques as appropriate to a performed exam including the   following-   automated exposure control, adjustments of mA and/or Kv according to patient   size, or use of iterative reconstructive technique. Nonenhanced images demonstrate an unremarkable appearance for the liver. Mild   distention of the gallbladder. Normal volume spleen. Pancreas appears   unremarkable on this nonenhanced study. Right adrenal gland unremarkable. Left adrenal gland with abnormal 2.3 cm left adrenal nodule (HU 12). Bilateral kidneys appears unremarkable on this nonenhanced study.         Evidence for prior bowel surgery; surgical suture line sigmoid colon. Colonic   diverticulosis. No bowel obstruction. No ascites. Atherosclerosis noted normal caliber abdominal aorta with extension to pelvic   arteries. Lumbar spondylosis. No lumbar vertebral body compression deformity. SI joints   and hip joints intact. No pelvic fracture. 06/01/21 4975  CT CHEST WO CONT Final result    Impression:  Nonspecific subcentimeter nodules scattered throughout the lungs. Correlate any clinical concern for metastatic disease. At minimum, consider 3   month CT chest follow-up to demonstrate stability. 4 cm  ascending thoracic aortic aneurysm. Thoracic aorta atherosclerosis. CAD. Narrative:  CT chest without IV contrast.       Axial images are reviewed along with reformatted sagittal/coronal/MIP images. No   IV contrast administered. Dose reduction: All CT scans at this facility are performed using dose reduction   optimization techniques as appropriate to a performed exam including the   following-   automated exposure control, adjustments of mA and/or Kv according to patient   size, or use of iterative reconstructive technique. .       Scattered nonspecific subcentimeter nodules. Nodules are 5 mm or less. No   pneumothorax or pleural effusion. Nonenhanced images reveal atherosclerotic change thoracic aorta. 4 cm ascending   thoracic aortic aneurysm. Normal caliber pulmonary arterial trunk. Aortic and   mitral valvular calcification. Coronary artery disease. No pericardial effusion. Atherosclerosis continues into the imaged upper abdominal aorta. Normal alignment thoracic vertebral column. No thoracic vertebral body   compression deformity. Sternum intact. No displaced rib fracture. Sternoclavicular joints and shoulder joints appear intact. 06/01/21 5660  CT SPINE CERV WO CONT Final result    Impression:  Cervical spondylosis.  No CT evidence for fracture or malalignment of   the cervical spine. Nonspecific 4 mm RUL nodule could be followed for stability. Narrative:  CT cervical spine. Axial images are reviewed along with reformatted sagittal/coronal/3-D MIP   images. Dose reduction: All CT scans at this facility are performed using dose reduction   optimization techniques as appropriate to a performed exam including the   following-   automated exposure control, adjustments of mA and/or Kv according to patient   size, or use of iterative reconstructive technique. There is normal alignment of the cervical vertebral column. Disc space narrowing   with endplate sclerosis and anterior osteophyte formation. Uncovertebral/facet   hypertrophic change. Facet are normally aligned. No prevertebral soft tissue   swelling. Coronal images reveal normal C1-C2 relation. 4 mm RUL nodule could be followed for stability. Otherwise apical lungs are   clear. Carotid artery atherosclerosis. 06/01/21 5376  CT MAXILLOFACIAL WO CONT Final result    Impression:  No CT evidence for facial bone fracture. Narrative:  CT facial bones. Axial images are reviewed along with reformatted sagittal/coronal images. Dose reduction: All CT scans at this facility are performed using dose reduction   optimization techniques as appropriate to a performed exam including the   following-   automated exposure control, adjustments of mA and/or Kv according to patient   size, or use of iterative reconstructive technique. .       Sinuses are normally aerated. Nasal bones intact. Zygomatic arches intact. Coronal images reveal orbital margins are intact; no orbital wall fracture. Sagittal images demonstrate mandibular condyles are normally positioned. No   mandible fracture. Imaged upper cervical spine with hypertrophic bone   atlantoaxial joint; otherwise C1-C2 interval intact. Soft tissue axial images   reveal prior ophthalmologic surgery.  No superficial radiopaque foreign material evident; however, clinical inspection could follow. 06/01/21 0637  CT HEAD WO CONT Final result    Impression:      No acute intracranial bleed. Atherosclerosis. Follow-up as clinically indicated. Narrative:  CT head without contrast       Dose reduction: All CT scans at this facility are performed using dose reduction   optimization techniques as appropriate to a performed exam including the   following: Automated exposure control, adjustments of the mA and/or kV according   to patient size, or use of iterative reconstruction technique. INDICATION: Fall       FINDINGS:       No acute intracranial bleed or midline shift. Lateral ventricles are slightly   prominent. Possible mild white matter small vessel ischemic changes. MRI is more   sensitive for evaluation of brain parenchyma. Partially empty sella. No skull   fracture. No fluid in the paranasal sinuses or mastoid air cells. Atherosclerosis. CXR Results  (Last 48 hours)    None        Study Result    Narrative & Impression   Calvarium, 3 views, 6/2/2021     History: Falls.     Comparison: Including CT head 6/1/2021.     Findings: The calvarium appears intact. The orbits are grossly intact. The  paranasal sinuses and mastoid air cells show no significant opacification. The  nasal bones, maxilla and mandible show no acute finding. The patient is  edentulous.     IMPRESSION  No acute osseous abnormality definitively identified. Medical Decision Making   - I am the first provider for this patient. - I reviewed the vital signs, available nursing notes, past medical history, past surgical history, family history and social history. - Initial assessment performed. The patients presenting problems have been discussed, and they are in agreement with the care plan formulated and outlined with them. I have encouraged them to ask questions as they arise throughout their visit.     Vital Signs-Reviewed the patient's vital signs. No data found. Records Reviewed: Nursing Notes    ED Course/Provider Notes (Medical Decision Making): Uneventful ED course, clinical improvement with therapy, patient will be discharged to followup with PCP as directed    Disposition     Disposition: Condition stable and improved  DC- Adult Discharges: All of the diagnostic tests were reviewed and questions answered. Diagnosis, care plan and treatment options were discussed. The patient understands the instructions and will follow up as directed. The patients results have been reviewed with them. They have been counseled regarding their diagnosis. The patient verbally convey understanding and agreement of the signs, symptoms, diagnosis, treatment and prognosis and additionally agrees to follow up as recommended with their PCP in 24 - 48 hours. They also agree with the care-plan and convey that all of their questions have been answered. I have also put together some discharge instructions for them that include: 1) educational information regarding their diagnosis, 2) how to care for their diagnosis at home, as well a 3) list of reasons why they would want to return to the ED prior to their follow-up appointment, should their condition change. Discharged    DISCHARGE PLAN:  1. Current Discharge Medication List      CONTINUE these medications which have NOT CHANGED    Details   losartan (COZAAR) 25 mg tablet Take 50 mg by mouth daily. amLODIPine (NORVASC) 5 mg tablet Take 5 mg by mouth daily. aspirin (aspirin) 325 mg tablet Take 325 mg by mouth daily. traZODone (DESYREL) 100 mg tablet Take 100 mg by mouth nightly. cholecalciferol, vitamin D3, (VITAMIN D3) 2,000 unit Tab Take 1 Tab by mouth daily. 2.   Follow-up Information     Follow up With Specialties Details Why Contact Info    Rufus Darnell MD Geriatric Medicine In 2 days  [address]  [phone]          3.   Return to ED if worse   4. Discharge Medication List as of 6/2/2021  9:48 AM      START taking these medications    Details   nitrofurantoin, macrocrystal-monohydrate, (Macrobid) 100 mg capsule Take 1 Capsule by mouth two (2) times a day for 7 days. , Normal, Disp-14 Capsule, R-0         CONTINUE these medications which have NOT CHANGED    Details   losartan (COZAAR) 25 mg tablet Take 50 mg by mouth daily. , Historical Med      amLODIPine (NORVASC) 5 mg tablet Take 5 mg by mouth daily. , Historical Med      aspirin (aspirin) 325 mg tablet Take 325 mg by mouth daily. , Historical Med      traZODone (DESYREL) 100 mg tablet Take 100 mg by mouth nightly., Historical Med      cholecalciferol, vitamin D3, (VITAMIN D3) 2,000 unit Tab Take 1 Tab by mouth daily. , Historical Med               Diagnosis     Clinical Impression:   1. Contusion of head, initial encounter    2. Urinary tract infection without hematuria, site unspecified        Attestations:    Lenora Negron MD    Please note that this dictation was completed with [employer], the [employer] voice recognition software. Quite often unanticipated grammatical, syntax, homophones, and other interpretive errors are inadvertently transcribed by the computer software. Please disregard these errors. Please excuse any errors that have escaped final proofreading. Thank you.

## 2021-06-02 NOTE — DISCHARGE INSTRUCTIONS
Take medications as prescribed and follow-up with your PCP in 2 to 3 days. Return to the emergency room for any new or worsening symptoms. Thank you! Thank you for allowing me to care for you in the emergency department. I sincerely hope that you are satisfied with your visit today. It is my goal to provide you with excellent care. Below you will find a list of your labs and imaging from your visit today. Should you have any questions regarding these results please do not hesitate to call the emergency department. Labs -     No results found for this or any previous visit (from the past 12 hour(s)). Radiologic Studies -   XR SKULL  AP/ LAT   Final Result   No acute osseous abnormality definitively identified. CT Results  (Last 48 hours)                 06/01/21 0638  CT ABD PELV WO CONT Final result    Impression:  Nonspecific 2.3 cm left adrenal nodule. Correlate any clinical concern for metastatic disease. Prior bowel surgery. No ascites. Narrative:  CT abdomen and pelvis without IV contrast.       CT chest reported separately. Axial images are reviewed along with reformatted sagittal/coronal images. No IV   contrast administered. Dose reduction: All CT scans at this facility are performed using dose reduction   optimization techniques as appropriate to a performed exam including the   following-   automated exposure control, adjustments of mA and/or Kv according to patient   size, or use of iterative reconstructive technique. Nonenhanced images demonstrate an unremarkable appearance for the liver. Mild   distention of the gallbladder. Normal volume spleen. Pancreas appears   unremarkable on this nonenhanced study. Right adrenal gland unremarkable. Left adrenal gland with abnormal 2.3 cm left adrenal nodule (HU 12). Bilateral kidneys appears unremarkable on this nonenhanced study.         Evidence for prior bowel surgery; surgical suture line sigmoid colon. Colonic   diverticulosis. No bowel obstruction. No ascites. Atherosclerosis noted normal caliber abdominal aorta with extension to pelvic   arteries. Lumbar spondylosis. No lumbar vertebral body compression deformity. SI joints   and hip joints intact. No pelvic fracture. 06/01/21 0902  CT CHEST WO CONT Final result    Impression:  Nonspecific subcentimeter nodules scattered throughout the lungs. Correlate any clinical concern for metastatic disease. At minimum, consider 3   month CT chest follow-up to demonstrate stability. 4 cm  ascending thoracic aortic aneurysm. Thoracic aorta atherosclerosis. CAD. Narrative:  CT chest without IV contrast.       Axial images are reviewed along with reformatted sagittal/coronal/MIP images. No   IV contrast administered. Dose reduction: All CT scans at this facility are performed using dose reduction   optimization techniques as appropriate to a performed exam including the   following-   automated exposure control, adjustments of mA and/or Kv according to patient   size, or use of iterative reconstructive technique. .       Scattered nonspecific subcentimeter nodules. Nodules are 5 mm or less. No   pneumothorax or pleural effusion. Nonenhanced images reveal atherosclerotic change thoracic aorta. 4 cm ascending   thoracic aortic aneurysm. Normal caliber pulmonary arterial trunk. Aortic and   mitral valvular calcification. Coronary artery disease. No pericardial effusion. Atherosclerosis continues into the imaged upper abdominal aorta. Normal alignment thoracic vertebral column. No thoracic vertebral body   compression deformity. Sternum intact. No displaced rib fracture. Sternoclavicular joints and shoulder joints appear intact. 06/01/21 4128  CT SPINE CERV WO CONT Final result    Impression:  Cervical spondylosis. No CT evidence for fracture or malalignment of   the cervical spine.        Nonspecific 4 mm RUL nodule could be followed for stability. Narrative:  CT cervical spine. Axial images are reviewed along with reformatted sagittal/coronal/3-D MIP   images. Dose reduction: All CT scans at this facility are performed using dose reduction   optimization techniques as appropriate to a performed exam including the   following-   automated exposure control, adjustments of mA and/or Kv according to patient   size, or use of iterative reconstructive technique. There is normal alignment of the cervical vertebral column. Disc space narrowing   with endplate sclerosis and anterior osteophyte formation. Uncovertebral/facet   hypertrophic change. Facet are normally aligned. No prevertebral soft tissue   swelling. Coronal images reveal normal C1-C2 relation. 4 mm RUL nodule could be followed for stability. Otherwise apical lungs are   clear. Carotid artery atherosclerosis. 06/01/21 1709  CT MAXILLOFACIAL WO CONT Final result    Impression:  No CT evidence for facial bone fracture. Narrative:  CT facial bones. Axial images are reviewed along with reformatted sagittal/coronal images. Dose reduction: All CT scans at this facility are performed using dose reduction   optimization techniques as appropriate to a performed exam including the   following-   automated exposure control, adjustments of mA and/or Kv according to patient   size, or use of iterative reconstructive technique. .       Sinuses are normally aerated. Nasal bones intact. Zygomatic arches intact. Coronal images reveal orbital margins are intact; no orbital wall fracture. Sagittal images demonstrate mandibular condyles are normally positioned. No   mandible fracture. Imaged upper cervical spine with hypertrophic bone   atlantoaxial joint; otherwise C1-C2 interval intact. Soft tissue axial images   reveal prior ophthalmologic surgery.  No superficial radiopaque foreign material   evident; however, clinical inspection could follow. 06/01/21 0637  CT HEAD WO CONT Final result    Impression:      No acute intracranial bleed. Atherosclerosis. Follow-up as clinically indicated. Narrative:  CT head without contrast       Dose reduction: All CT scans at this facility are performed using dose reduction   optimization techniques as appropriate to a performed exam including the   following: Automated exposure control, adjustments of the mA and/or kV according   to patient size, or use of iterative reconstruction technique. INDICATION: Fall       FINDINGS:       No acute intracranial bleed or midline shift. Lateral ventricles are slightly   prominent. Possible mild white matter small vessel ischemic changes. MRI is more   sensitive for evaluation of brain parenchyma. Partially empty sella. No skull   fracture. No fluid in the paranasal sinuses or mastoid air cells. Atherosclerosis. CXR Results  (Last 48 hours)      None               If you feel that you have not received excellent quality care or timely care, please ask to speak to the nurse manager. Please choose us in the future for your continued health care needs. ------------------------------------------------------------------------------------------------------------  The exam and treatment you received in the Emergency Department were for an urgent problem and are not intended as complete care. It is important that you follow-up with a doctor, nurse practitioner, or physician assistant to:  (1) confirm your diagnosis,  (2) re-evaluation of changes in your illness and treatment, and  (3) for ongoing care. If your symptoms become worse or you do not improve as expected and you are unable to reach your usual health care provider, you should return to the Emergency Department. We are available 24 hours a day. Please take your discharge instructions with you when you go to your follow-up appointment.      If you have any problem arranging a follow-up appointment, contact the Emergency Department immediately. If a prescription has been provided, please have it filled as soon as possible to prevent a delay in treatment. Read the entire medication instruction sheet provided to you by the pharmacy. If you have any questions or reservations about taking the medication due to side effects or interactions with other medications, please call your primary care physician or contact the ER to speak with the charge nurse. Make an appointment with your family doctor or the physician you were referred to for follow-up of this visit as instructed on your discharge paperwork, as this is a mandatory follow-up. Return to the ER if you are unable to be seen or if you are unable to be seen in a timely manner. If you have any problem arranging the follow-up visit, contact the Emergency Department immediately.

## 2021-06-03 ENCOUNTER — HOSPITAL ENCOUNTER (EMERGENCY)
Age: 86
Discharge: HOME | End: 2021-06-03
Attending: EMERGENCY MEDICINE
Payer: MEDICARE

## 2021-06-03 ENCOUNTER — APPOINTMENT (OUTPATIENT)
Dept: GENERAL RADIOLOGY | Age: 86
End: 2021-06-03
Attending: EMERGENCY MEDICINE
Payer: MEDICARE

## 2021-06-03 VITALS
SYSTOLIC BLOOD PRESSURE: 176 MMHG | RESPIRATION RATE: 20 BRPM | BODY MASS INDEX: 20.62 KG/M2 | OXYGEN SATURATION: 99 % | HEIGHT: 60 IN | DIASTOLIC BLOOD PRESSURE: 81 MMHG | TEMPERATURE: 97.4 F | WEIGHT: 105 LBS | HEART RATE: 100 BPM

## 2021-06-03 DIAGNOSIS — F41.0 PANIC ATTACK: Primary | ICD-10-CM

## 2021-06-03 LAB
ANION GAP SERPL CALC-SCNC: 10 MMOL/L (ref 5–15)
BASOPHILS # BLD: 0 K/UL (ref 0–0.1)
BASOPHILS NFR BLD: 0 % (ref 0–1)
BUN SERPL-MCNC: 16 MG/DL (ref 6–20)
BUN/CREAT SERPL: 17 (ref 12–20)
CA-I BLD-MCNC: 9.4 MG/DL (ref 8.5–10.1)
CHLORIDE SERPL-SCNC: 97 MMOL/L (ref 97–108)
CO2 SERPL-SCNC: 28 MMOL/L (ref 21–32)
CREAT SERPL-MCNC: 0.92 MG/DL (ref 0.55–1.02)
DIFFERENTIAL METHOD BLD: ABNORMAL
EOSINOPHIL # BLD: 0.1 K/UL (ref 0–0.4)
EOSINOPHIL NFR BLD: 1 % (ref 0–7)
ERYTHROCYTE [DISTWIDTH] IN BLOOD BY AUTOMATED COUNT: 12.9 % (ref 11.5–14.5)
GLUCOSE SERPL-MCNC: 114 MG/DL (ref 65–100)
HCT VFR BLD AUTO: 34.2 % (ref 35–47)
HGB BLD-MCNC: 12 G/DL (ref 11.5–16)
IMM GRANULOCYTES # BLD AUTO: 0 K/UL (ref 0–0.04)
IMM GRANULOCYTES NFR BLD AUTO: 0 % (ref 0–0.5)
LYMPHOCYTES # BLD: 0.8 K/UL (ref 0.8–3.5)
LYMPHOCYTES NFR BLD: 7 % (ref 12–49)
MCH RBC QN AUTO: 30.8 PG (ref 26–34)
MCHC RBC AUTO-ENTMCNC: 35.1 G/DL (ref 30–36.5)
MCV RBC AUTO: 87.9 FL (ref 80–99)
MONOCYTES # BLD: 1.1 K/UL (ref 0–1)
MONOCYTES NFR BLD: 9 % (ref 5–13)
NEUTS SEG # BLD: 9.3 K/UL (ref 1.8–8)
NEUTS SEG NFR BLD: 83 % (ref 32–75)
PLATELET # BLD AUTO: 211 K/UL (ref 150–400)
PMV BLD AUTO: 10.1 FL (ref 8.9–12.9)
POTASSIUM SERPL-SCNC: 4.6 MMOL/L (ref 3.5–5.1)
RBC # BLD AUTO: 3.89 M/UL (ref 3.8–5.2)
SODIUM SERPL-SCNC: 135 MMOL/L (ref 136–145)
TROPONIN I SERPL-MCNC: <0.05 NG/ML
WBC # BLD AUTO: 11.3 K/UL (ref 3.6–11)

## 2021-06-03 PROCEDURE — 80048 BASIC METABOLIC PNL TOTAL CA: CPT

## 2021-06-03 PROCEDURE — 99284 EMERGENCY DEPT VISIT MOD MDM: CPT

## 2021-06-03 PROCEDURE — 36415 COLL VENOUS BLD VENIPUNCTURE: CPT

## 2021-06-03 PROCEDURE — 93005 ELECTROCARDIOGRAM TRACING: CPT

## 2021-06-03 PROCEDURE — 85025 COMPLETE CBC W/AUTO DIFF WBC: CPT

## 2021-06-03 PROCEDURE — 71045 X-RAY EXAM CHEST 1 VIEW: CPT

## 2021-06-03 PROCEDURE — 84484 ASSAY OF TROPONIN QUANT: CPT

## 2021-06-04 LAB
ATRIAL RATE: 98 BPM
CALCULATED P AXIS, ECG09: 68 DEGREES
CALCULATED R AXIS, ECG10: 13 DEGREES
CALCULATED T AXIS, ECG11: 59 DEGREES
DIAGNOSIS, 93000: NORMAL
P-R INTERVAL, ECG05: 168 MS
Q-T INTERVAL, ECG07: 332 MS
QRS DURATION, ECG06: 68 MS
QTC CALCULATION (BEZET), ECG08: 423 MS
VENTRICULAR RATE, ECG03: 98 BPM

## 2021-06-04 NOTE — ED TRIAGE NOTES
Pt reports SOB at home. Family called EMS to house. Pt seen multiple times for falls and panic attack recently.

## 2021-06-04 NOTE — ED PROVIDER NOTES
EMERGENCY DEPARTMENT HISTORY AND PHYSICAL EXAM      Date: 6/3/2021  Patient Name: Hemanth Pierce    History of Presenting Illness     Chief Complaint   Patient presents with    Shortness of Breath       History Provided By: Patient/EMS/granddaughter    HPI: Hemanth Pierce, 80 y.o. female   presents to the ED with cc of shortness of breath. Patient complains of sudden onset of shortness of breath with a sense of \"being overwhelmed\" just prior to arrival.  Granddaughter states that patient was feeling fine until her arrival to house when she found her hyperventilating. Patient was recently seen in the emergency room for panic attack and ground-level fall. Patient recently lost her  and son and lives alone but with close support by her daughter and granddaughter. Patient and granddaughter denies further fall since the last evaluation emergency room. Currently, patient is feeling back to normal without chest pain or shortness of breath. PCP: Rufus Darnell MD    No current facility-administered medications on file prior to encounter. Current Outpatient Medications on File Prior to Encounter   Medication Sig Dispense Refill    nitrofurantoin, macrocrystal-monohydrate, (Macrobid) 100 mg capsule Take 1 Capsule by mouth two (2) times a day for 7 days. 14 Capsule 0    losartan (COZAAR) 25 mg tablet Take 50 mg by mouth daily.  amLODIPine (NORVASC) 5 mg tablet Take 5 mg by mouth daily.  aspirin (aspirin) 325 mg tablet Take 325 mg by mouth daily.  traZODone (DESYREL) 100 mg tablet Take 100 mg by mouth nightly.  cholecalciferol, vitamin D3, (VITAMIN D3) 2,000 unit Tab Take 1 Tab by mouth daily.          Past History     Past Medical History:  Past Medical History:   Diagnosis Date    Arthritis     GERD (gastroesophageal reflux disease)     Hypertension     Other ill-defined conditions(799.89)     Diverticulitis    Psychiatric disorder     depression       Past Surgical History:  Past Surgical History:   Procedure Laterality Date    HX GI  2010    Colon resection    HX GYN  1973    Hysterectomy    HX HEENT  2013    Laser eye surgery on left    HX ORTHOPAEDIC  1995    Left rotator cuff surgery    HX ORTHOPAEDIC      right 2nd finger       Family History:  Family History   Problem Relation Age of Onset    Lung Disease Mother     Diabetes Sister     Heart Disease Sister        Social History:  Social History     Tobacco Use    Smoking status: Former Smoker    Smokeless tobacco: Former User   Substance Use Topics    Alcohol use: No    Drug use: No       Allergies: Allergies   Allergen Reactions    Penicillin G Anaphylaxis    Codeine Other (comments)     I can't walk and don't have control over my body         Review of Systems   Review of Systems   Constitutional: Negative for activity change, appetite change, chills and fever. HENT: Negative for sore throat. Eyes: Negative for discharge. Respiratory: Positive for shortness of breath. Negative for cough. Cardiovascular: Negative for chest pain. Gastrointestinal: Negative for blood in stool and nausea. Endocrine: Negative for polyuria. Genitourinary: Negative for difficulty urinating. Musculoskeletal: Negative for arthralgias. Skin: Negative for rash. Neurological: Negative for headaches. Hematological: Negative for adenopathy. Psychiatric/Behavioral: The patient is nervous/anxious. All other systems reviewed and are negative. Physical Exam   Physical Exam  Vitals and nursing note reviewed. Constitutional:       Appearance: Normal appearance. HENT:      Head: Normocephalic and atraumatic. Comments: Except ecchymosis on left forehead and left side of face     Nose: Nose normal.      Mouth/Throat:      Mouth: Mucous membranes are moist.      Pharynx: Oropharynx is clear. Eyes:      Extraocular Movements: Extraocular movements intact.       Conjunctiva/sclera: Conjunctivae normal. Pupils: Pupils are equal, round, and reactive to light. Cardiovascular:      Rate and Rhythm: Normal rate and regular rhythm. Heart sounds: Normal heart sounds. Pulmonary:      Effort: Pulmonary effort is normal.      Breath sounds: Normal breath sounds. Abdominal:      General: Abdomen is flat. Bowel sounds are normal.      Palpations: Abdomen is soft. Musculoskeletal:      Cervical back: Neck supple. Right lower leg: No edema. Left lower leg: No edema. Skin:     General: Skin is warm and dry. Neurological:      General: No focal deficit present. Mental Status: She is alert and oriented to person, place, and time. Cranial Nerves: No cranial nerve deficit. Motor: No weakness. Psychiatric:         Mood and Affect: Mood normal.         Behavior: Behavior normal.         Diagnostic Study Results     Labs -     Recent Results (from the past 12 hour(s))   CBC WITH AUTOMATED DIFF    Collection Time: 06/03/21  8:38 PM   Result Value Ref Range    WBC 11.3 (H) 3.6 - 11.0 K/uL    RBC 3.89 3.80 - 5.20 M/uL    HGB 12.0 11.5 - 16.0 g/dL    HCT 34.2 (L) 35.0 - 47.0 %    MCV 87.9 80.0 - 99.0 FL    MCH 30.8 26.0 - 34.0 PG    MCHC 35.1 30.0 - 36.5 g/dL    RDW 12.9 11.5 - 14.5 %    PLATELET 289 494 - 591 K/uL    MPV 10.1 8.9 - 12.9 FL    NEUTROPHILS 83 (H) 32 - 75 %    LYMPHOCYTES 7 (L) 12 - 49 %    MONOCYTES 9 5 - 13 %    EOSINOPHILS 1 0 - 7 %    BASOPHILS 0 0 - 1 %    IMMATURE GRANULOCYTES 0 0.0 - 0.5 %    ABS. NEUTROPHILS 9.3 (H) 1.8 - 8.0 K/UL    ABS. LYMPHOCYTES 0.8 0.8 - 3.5 K/UL    ABS. MONOCYTES 1.1 (H) 0.0 - 1.0 K/UL    ABS. EOSINOPHILS 0.1 0.0 - 0.4 K/UL    ABS. BASOPHILS 0.0 0.0 - 0.1 K/UL    ABS. IMM.  GRANS. 0.0 0.00 - 0.04 K/UL    DF AUTOMATED     METABOLIC PANEL, BASIC    Collection Time: 06/03/21  8:38 PM   Result Value Ref Range    Sodium 135 (L) 136 - 145 mmol/L    Potassium 4.6 3.5 - 5.1 mmol/L    Chloride 97 97 - 108 mmol/L    CO2 28 21 - 32 mmol/L    Anion gap 10 5 - 15 mmol/L    Glucose 114 (H) 65 - 100 mg/dL    BUN 16 6 - 20 mg/dL    Creatinine 0.92 0.55 - 1.02 mg/dL    BUN/Creatinine ratio 17 12 - 20      GFR est AA >60 >60 ml/min/1.73m2    GFR est non-AA 58 (L) >60 ml/min/1.73m2    Calcium 9.4 8.5 - 10.1 mg/dL   TROPONIN I    Collection Time: 06/03/21  8:38 PM   Result Value Ref Range    Troponin-I, Qt. <0.05 <0.05 ng/mL       Radiologic Studies -   XR CHEST PORT   Final Result   No significant change compared to single view chest 5/22/2021. Please see CT chest report 6/1/2021 for further detail. CT Results  (Last 48 hours)    None        CXR Results  (Last 48 hours)               06/03/21 2043  XR CHEST PORT Final result    Impression:  No significant change compared to single view chest 5/22/2021. Please see CT chest report 6/1/2021 for further detail. Narrative:  Chest single view. Comparison single view chest 5/22/2021. Unchanged appearance for the lungs; no interstitial alveolar pulmonary edema. Cardiac and mediastinal structures unchanged noting thoracic aorta   atherosclerosis. No pneumothorax or sizable pleural effusion. Medical Decision Making   I am the first provider for this patient. I reviewed the vital signs, available nursing notes, past medical history, past surgical history, family history and social history. Vital Signs-Reviewed the patient's vital signs. Patient Vitals for the past 12 hrs:   Temp Pulse Resp BP SpO2   06/03/21 2004 97.4 °F (36.3 °C) 100 20 (!) 176/81 99 %       Records Reviewed:     Provider Notes (Medical Decision Making):       ED Course:   Initial assessment performed. The patients presenting problems have been discussed, and they are in agreement with the care plan formulated and outlined with them. I have encouraged them to ask questions as they arise throughout their visit.     ED Course as of Jun 03 2125   Thu Jun 03, 2021 2032 EKG normal sinus rhythm at 98 normal QRS QT normal axis no ectopy no ischemic changes no STEMI    [SK]      ED Course User Index  [SK] Tahir Mendoza MD         PROCEDURES      Disposition: Condition stable   DC- Adult Discharges: All of the diagnostic tests were reviewed and questions answered. Diagnosis, care plan and treatment options were discussed. understand instructions and will follow up as directed. The patients results have been reviewed with them. They have been counseled regarding their diagnosis. The patient verbally convey understanding and agreement of the signs, symptoms, diagnosis, treatment and prognosis and additionally agrees to follow up as recommended. They also agree with the care-plan and convey that all of their questions have been answered. I have also put together some discharge instructions for them that include: 1) educational information regarding their diagnosis, 2) how to care for their diagnosis at home, as well a 3) list of reasons why they would want to return to the ED prior to their follow-up appointment, should their condition change. PLAN:  1. Current Discharge Medication List        2. Follow-up Information     Follow up With Specialties Details Why Contact Info    Follow up with your primary care physician  Schedule an appointment as soon as possible for a visit in 3 days As needed         Return to ED if worse     Diagnosis     Clinical Impression:   1. Panic attack        Please note that this dictation was completed with Modbook, the computer voice recognition software. Quite often unanticipated grammatical, syntax, homophones, and other interpretive errors are inadvertently transcribed by the computer software. Please disregard these errors. Please excuse any errors that have escaped final proofreading. Thank you.

## 2021-06-06 ENCOUNTER — HOSPITAL ENCOUNTER (EMERGENCY)
Age: 86
Discharge: HOME | DRG: 391 | End: 2021-06-06
Attending: EMERGENCY MEDICINE
Payer: MEDICARE

## 2021-06-06 ENCOUNTER — APPOINTMENT (OUTPATIENT)
Dept: CT IMAGING | Age: 86
DRG: 391 | End: 2021-06-06
Attending: EMERGENCY MEDICINE
Payer: MEDICARE

## 2021-06-06 VITALS
TEMPERATURE: 98.5 F | OXYGEN SATURATION: 98 % | HEIGHT: 62 IN | BODY MASS INDEX: 18.4 KG/M2 | DIASTOLIC BLOOD PRESSURE: 76 MMHG | RESPIRATION RATE: 18 BRPM | HEART RATE: 90 BPM | WEIGHT: 100 LBS | SYSTOLIC BLOOD PRESSURE: 162 MMHG

## 2021-06-06 DIAGNOSIS — J02.9 PHARYNGITIS, UNSPECIFIED ETIOLOGY: ICD-10-CM

## 2021-06-06 DIAGNOSIS — F41.0 PANIC ATTACK: Primary | ICD-10-CM

## 2021-06-06 LAB
ANION GAP SERPL CALC-SCNC: 5 MMOL/L (ref 5–15)
BASOPHILS # BLD: 0 K/UL (ref 0–0.1)
BASOPHILS NFR BLD: 0 % (ref 0–1)
BUN SERPL-MCNC: 16 MG/DL (ref 6–20)
BUN/CREAT SERPL: 18 (ref 12–20)
CA-I BLD-MCNC: 9.8 MG/DL (ref 8.5–10.1)
CHLORIDE SERPL-SCNC: 100 MMOL/L (ref 97–108)
CO2 SERPL-SCNC: 28 MMOL/L (ref 21–32)
CREAT SERPL-MCNC: 0.87 MG/DL (ref 0.55–1.02)
DEPRECATED S PYO AG THROAT QL EIA: NEGATIVE
DIFFERENTIAL METHOD BLD: NORMAL
EOSINOPHIL # BLD: 0.1 K/UL (ref 0–0.4)
EOSINOPHIL NFR BLD: 1 % (ref 0–7)
ERYTHROCYTE [DISTWIDTH] IN BLOOD BY AUTOMATED COUNT: 13.1 % (ref 11.5–14.5)
GLUCOSE SERPL-MCNC: 104 MG/DL (ref 65–100)
HCT VFR BLD AUTO: 36.9 % (ref 35–47)
HGB BLD-MCNC: 12.4 G/DL (ref 11.5–16)
IMM GRANULOCYTES # BLD AUTO: 0 K/UL (ref 0–0.04)
IMM GRANULOCYTES NFR BLD AUTO: 0 % (ref 0–0.5)
LYMPHOCYTES # BLD: 1.2 K/UL (ref 0.8–3.5)
LYMPHOCYTES NFR BLD: 15 % (ref 12–49)
MCH RBC QN AUTO: 29.9 PG (ref 26–34)
MCHC RBC AUTO-ENTMCNC: 33.6 G/DL (ref 30–36.5)
MCV RBC AUTO: 88.9 FL (ref 80–99)
MONOCYTES # BLD: 0.9 K/UL (ref 0–1)
MONOCYTES NFR BLD: 10 % (ref 5–13)
NEUTS SEG # BLD: 6.1 K/UL (ref 1.8–8)
NEUTS SEG NFR BLD: 74 % (ref 32–75)
NRBC # BLD: 0 K/UL (ref 0–0.01)
NRBC BLD-RTO: 0 PER 100 WBC
PLATELET # BLD AUTO: 329 K/UL (ref 150–400)
PMV BLD AUTO: 10.4 FL (ref 8.9–12.9)
POTASSIUM SERPL-SCNC: 4.3 MMOL/L (ref 3.5–5.1)
RBC # BLD AUTO: 4.15 M/UL (ref 3.8–5.2)
SODIUM SERPL-SCNC: 133 MMOL/L (ref 136–145)
WBC # BLD AUTO: 8.4 K/UL (ref 3.6–11)

## 2021-06-06 PROCEDURE — 80048 BASIC METABOLIC PNL TOTAL CA: CPT

## 2021-06-06 PROCEDURE — 99283 EMERGENCY DEPT VISIT LOW MDM: CPT

## 2021-06-06 PROCEDURE — 96374 THER/PROPH/DIAG INJ IV PUSH: CPT

## 2021-06-06 PROCEDURE — 85025 COMPLETE CBC W/AUTO DIFF WBC: CPT

## 2021-06-06 PROCEDURE — 74011250636 HC RX REV CODE- 250/636: Performed by: EMERGENCY MEDICINE

## 2021-06-06 PROCEDURE — 74011000636 HC RX REV CODE- 636: Performed by: EMERGENCY MEDICINE

## 2021-06-06 PROCEDURE — 70491 CT SOFT TISSUE NECK W/DYE: CPT

## 2021-06-06 PROCEDURE — 87880 STREP A ASSAY W/OPTIC: CPT

## 2021-06-06 RX ORDER — HYDROXYZINE PAMOATE 25 MG/1
25 CAPSULE ORAL
Qty: 30 CAPSULE | Refills: 0 | Status: ON HOLD | OUTPATIENT
Start: 2021-06-06 | End: 2021-06-14 | Stop reason: SDUPTHER

## 2021-06-06 RX ORDER — METHYLPREDNISOLONE 4 MG/1
TABLET ORAL
Qty: 1 DOSE PACK | Refills: 0 | Status: SHIPPED | OUTPATIENT
Start: 2021-06-06 | End: 2021-06-14

## 2021-06-06 RX ADMIN — IOPAMIDOL 80 ML: 755 INJECTION, SOLUTION INTRAVENOUS at 14:44

## 2021-06-06 RX ADMIN — METHYLPREDNISOLONE SODIUM SUCCINATE 125 MG: 125 INJECTION, POWDER, FOR SOLUTION INTRAMUSCULAR; INTRAVENOUS at 15:14

## 2021-06-06 NOTE — ED PROVIDER NOTES
EMERGENCY DEPARTMENT HISTORY AND PHYSICAL EXAM      Date: 6/6/2021  Patient Name: Liset William    History of Presenting Illness     Chief Complaint   Patient presents with    Sore Throat       History Provided By: Patient    HPI: Liset William, 80 y.o. female with a past medical history significant No significant past medical history presents to the ED with chief complaint of Sore Throat  . 80-year-old female seen at multiple different facilities for a sore throat difficulty swallowing feeling also with periorbital tingling. Is always been told it is a negative work-up with just exam and told it is an anxiety attack. Today patient notes difficulty breathing and swallowing. Does not feel it is related to food. Ongoing for quite a while. No numbness or weakness. Known dysphagia. There are no other complaints, changes, or physical findings at this time. PCP: Doron Crum MD    Current Outpatient Medications   Medication Sig Dispense Refill    methylPREDNISolone (Medrol, Rey,) 4 mg tablet As directed 1 Dose Pack 0    hydrOXYzine pamoate (VistariL) 25 mg capsule Take 1 Capsule by mouth three (3) times daily as needed for Anxiety for up to 14 days. 30 Capsule 0    nitrofurantoin, macrocrystal-monohydrate, (Macrobid) 100 mg capsule Take 1 Capsule by mouth two (2) times a day for 7 days. 14 Capsule 0    losartan (COZAAR) 25 mg tablet Take 50 mg by mouth daily.  amLODIPine (NORVASC) 5 mg tablet Take 5 mg by mouth daily.  aspirin (aspirin) 325 mg tablet Take 325 mg by mouth daily.  traZODone (DESYREL) 100 mg tablet Take 100 mg by mouth nightly.  cholecalciferol, vitamin D3, (VITAMIN D3) 2,000 unit Tab Take 1 Tab by mouth daily.          Past History     Past Medical History:  Past Medical History:   Diagnosis Date    Arthritis     GERD (gastroesophageal reflux disease)     Hypertension     Other ill-defined conditions(759.48)     Diverticulitis    Psychiatric disorder     depression       Past Surgical History:  Past Surgical History:   Procedure Laterality Date    HX GI  2010    Colon resection    HX GYN  1973    Hysterectomy    HX HEENT  2013    Laser eye surgery on left    HX ORTHOPAEDIC  1995    Left rotator cuff surgery    HX ORTHOPAEDIC      right 2nd finger       Family History:  Family History   Problem Relation Age of Onset    Lung Disease Mother     Diabetes Sister     Heart Disease Sister        Social History:  Social History     Tobacco Use    Smoking status: Former Smoker    Smokeless tobacco: Former User   Substance Use Topics    Alcohol use: No    Drug use: No       Allergies: Allergies   Allergen Reactions    Penicillin G Anaphylaxis    Codeine Other (comments)     I can't walk and don't have control over my body         Review of Systems   Review of Systems   Constitutional: Negative. Negative for chills, fatigue and fever. HENT: Positive for sore throat. Negative for congestion and nosebleeds. Eyes: Negative. Negative for pain, discharge and visual disturbance. Respiratory: Negative for cough, chest tightness and shortness of breath. Cardiovascular: Positive for palpitations. Negative for chest pain and leg swelling. Gastrointestinal: Negative for abdominal pain, blood in stool, constipation, diarrhea, nausea and vomiting. Endocrine: Negative. Genitourinary: Negative. Negative for difficulty urinating, dysuria, pelvic pain and vaginal bleeding. Musculoskeletal: Negative. Negative for arthralgias, back pain and myalgias. Skin: Negative. Negative for rash and wound. Allergic/Immunologic: Negative. Neurological: Positive for numbness. Negative for dizziness, syncope, weakness and headaches. Hematological: Negative. Psychiatric/Behavioral: Negative. Negative for agitation, confusion and suicidal ideas. All other systems reviewed and are negative.       Physical Exam   Physical Exam  Vitals and nursing note reviewed. Exam conducted with a chaperone present. Constitutional:       Appearance: Normal appearance. She is normal weight. HENT:      Head: Normocephalic and atraumatic. Nose: Nose normal.      Mouth/Throat:      Mouth: Mucous membranes are moist.      Pharynx: Oropharynx is clear. Eyes:      Extraocular Movements: Extraocular movements intact. Conjunctiva/sclera: Conjunctivae normal.      Pupils: Pupils are equal, round, and reactive to light. Neck:      Thyroid: No thyromegaly. Cardiovascular:      Rate and Rhythm: Normal rate and regular rhythm. Pulses: Normal pulses. Heart sounds: Normal heart sounds. Pulmonary:      Effort: Pulmonary effort is normal. No respiratory distress. Breath sounds: Normal breath sounds. Abdominal:      General: Abdomen is flat. Bowel sounds are normal. There is no distension. Palpations: Abdomen is soft. Tenderness: There is no abdominal tenderness. There is no guarding. Musculoskeletal:         General: No swelling, tenderness, deformity or signs of injury. Normal range of motion. Cervical back: Normal range of motion and neck supple. Right lower leg: No edema. Left lower leg: No edema. Lymphadenopathy:      Cervical: No cervical adenopathy. Skin:     General: Skin is warm and dry. Capillary Refill: Capillary refill takes less than 2 seconds. Findings: No lesion or rash. Neurological:      General: No focal deficit present. Mental Status: She is alert and oriented to person, place, and time. Mental status is at baseline. Cranial Nerves: No cranial nerve deficit. Psychiatric:         Mood and Affect: Mood normal.         Behavior: Behavior normal.         Thought Content:  Thought content normal.         Judgment: Judgment normal.         Diagnostic Study Results     Labs -     Recent Results (from the past 12 hour(s))   STREP AG SCREEN, GROUP A    Collection Time: 06/06/21  2:15 PM Specimen: Throat   Result Value Ref Range    Group A Strep Ag ID Negative     CBC WITH AUTOMATED DIFF    Collection Time: 06/06/21  2:15 PM   Result Value Ref Range    WBC 8.4 3.6 - 11.0 K/uL    RBC 4.15 3.80 - 5.20 M/uL    HGB 12.4 11.5 - 16.0 g/dL    HCT 36.9 35.0 - 47.0 %    MCV 88.9 80.0 - 99.0 FL    MCH 29.9 26.0 - 34.0 PG    MCHC 33.6 30.0 - 36.5 g/dL    RDW 13.1 11.5 - 14.5 %    PLATELET 297 858 - 497 K/uL    MPV 10.4 8.9 - 12.9 FL    NRBC 0.0 0.0  WBC    ABSOLUTE NRBC 0.00 0.00 - 0.01 K/uL    NEUTROPHILS 74 32 - 75 %    LYMPHOCYTES 15 12 - 49 %    MONOCYTES 10 5 - 13 %    EOSINOPHILS 1 0 - 7 %    BASOPHILS 0 0 - 1 %    IMMATURE GRANULOCYTES 0 0 - 0.5 %    ABS. NEUTROPHILS 6.1 1.8 - 8.0 K/UL    ABS. LYMPHOCYTES 1.2 0.8 - 3.5 K/UL    ABS. MONOCYTES 0.9 0.0 - 1.0 K/UL    ABS. EOSINOPHILS 0.1 0.0 - 0.4 K/UL    ABS. BASOPHILS 0.0 0.0 - 0.1 K/UL    ABS. IMM. GRANS. 0.0 0.00 - 0.04 K/UL    DF AUTOMATED     METABOLIC PANEL, BASIC    Collection Time: 06/06/21  2:15 PM   Result Value Ref Range    Sodium 133 (L) 136 - 145 mmol/L    Potassium 4.3 3.5 - 5.1 mmol/L    Chloride 100 97 - 108 mmol/L    CO2 28 21 - 32 mmol/L    Anion gap 5 5 - 15 mmol/L    Glucose 104 (H) 65 - 100 mg/dL    BUN 16 6 - 20 mg/dL    Creatinine 0.87 0.55 - 1.02 mg/dL    BUN/Creatinine ratio 18 12 - 20      GFR est AA >60 >60 ml/min/1.73m2    GFR est non-AA >60 >60 ml/min/1.73m2    Calcium 9.8 8.5 - 10.1 mg/dL       Radiologic Studies -   CT NECK SOFT TISSUE W CONT   Final Result   No demonstrated abnormality. CT Results  (Last 48 hours)               06/06/21 1441  CT NECK SOFT TISSUE W CONT Final result    Impression:  No demonstrated abnormality. Narrative:  Exam: CT soft tissue neck with intravenous contrast       Comparison: Cervical spine and chest CT 6/1/2021       Technique: IV contrast (100 cc Isovue-370) CT of the soft tissues of the neck   was performed.  Axial projections were converted to coronal and sagittal reconstructions. Findings: The airway appears clear. The epiglottis and aryepiglottic folds are within normal.    Prevertebral soft tissues are within normal.    Waldeyer's Ring is normal.    Parapharyngeal fat planes are clear. The Fossae of Rosenmuller appear clear. The subglottic trachea appears clear. The parotid glands and submandibular glands are within normal.    The floor of the mouth is normal.    There is no cervical soft tissue mass or adenopathy. Stable multilevel cervical spine degenerative disease. The carotid arterial trees appear clear. The internal jugular veins are clear. The thyroid gland is unremarkable. The paranasal sinus air cells appear clear. The meg medi and the mastoid air cells are clear. Stable right upper lobe subcentimeter noncalcified pulmonary nodules. CXR Results  (Last 48 hours)    None          Medical Decision Making and ED Course   I am the first provider for this patient. I reviewed the vital signs, available nursing notes, past medical history, past surgical history, family history and social history. Vital Signs-Reviewed the patient's vital signs. Patient Vitals for the past 12 hrs:   Temp Pulse Resp BP SpO2   06/06/21 1403 98.5 °F (36.9 °C) 90 18 (!) 162/76 98 %       EKG interpretation:         Records Reviewed: Previous Hospital chart. EMS run report      ED Course:   Initial assessment performed. The patients presenting problems have been discussed, and they are in agreement with the care plan formulated and outlined with them. I have encouraged them to ask questions as they arise throughout their visit.     Orders Placed This Encounter    STREP AG SCREEN, GROUP A     Standing Status:   Standing     Number of Occurrences:   1    CT NECK SOFT TISSUE W CONT     Standing Status:   Standing     Number of Occurrences:   1     Order Specific Question:   Transport     Answer:   BED [2]     Order Specific Question: Reason for Exam     Answer:   sob, difficulty swallowing     Order Specific Question:   Does patient have history of Renal Disease? Answer:   No     Order Specific Question:   Decision Support Exception     Answer:   Emergency Medical Condition (MA) [1]    CBC WITH AUTOMATED DIFF     Standing Status:   Standing     Number of Occurrences:   1    METABOLIC PANEL, BASIC     Standing Status:   Standing     Number of Occurrences:   1    methylPREDNISolone (PF) (Solu-MEDROL) injection 125 mg    iopamidoL (ISOVUE-370) 76 % injection 80 mL    methylPREDNISolone (Medrol, Rey,) 4 mg tablet     Sig: As directed     Dispense:  1 Dose Pack     Refill:  0    hydrOXYzine pamoate (VistariL) 25 mg capsule     Sig: Take 1 Capsule by mouth three (3) times daily as needed for Anxiety for up to 14 days. Dispense:  30 Capsule     Refill:  0              CONSULTANTS:  Consults      Provider Notes (Medical Decision Making):   40-year-old female with negative strep negative imaging for malignancy versus obstruction causing throat discomfort. Patient and family reassured. I do feel comfortable with it being a globus anxiety now that all the imaging has been normal.  They will follow up with the PCP physician. Procedures                       Disposition       Emergency Department Disposition:  Discharged      Diagnosis     Clinical Impression:   1. Panic attack    2. Pharyngitis, unspecified etiology        Attestations:    Gaurav Kimble MD    Please note that this dictation was completed with [employer]ub, the computer voice recognition software. Quite often unanticipated grammatical, syntax, homophones, and other interpretive errors are inadvertently transcribed by the computer software. Please disregard these errors. Please excuse any errors that have escaped final proofreading. Thank you.

## 2021-06-06 NOTE — DISCHARGE INSTRUCTIONS
Thank you! Thank you for allowing me to care for you in the emergency department. I sincerely hope that you are satisfied with your visit today. It is my goal to provide you with excellent care. Below you will find a list of your labs and imaging from your visit today. Should you have any questions regarding these results please do not hesitate to call the emergency department. Labs -     Recent Results (from the past 12 hour(s))   STREP AG SCREEN, GROUP A    Collection Time: 06/06/21  2:15 PM    Specimen: Throat   Result Value Ref Range    Group A Strep Ag ID Negative     CBC WITH AUTOMATED DIFF    Collection Time: 06/06/21  2:15 PM   Result Value Ref Range    WBC 8.4 3.6 - 11.0 K/uL    RBC 4.15 3.80 - 5.20 M/uL    HGB 12.4 11.5 - 16.0 g/dL    HCT 36.9 35.0 - 47.0 %    MCV 88.9 80.0 - 99.0 FL    MCH 29.9 26.0 - 34.0 PG    MCHC 33.6 30.0 - 36.5 g/dL    RDW 13.1 11.5 - 14.5 %    PLATELET 555 829 - 943 K/uL    MPV 10.4 8.9 - 12.9 FL    NRBC 0.0 0.0  WBC    ABSOLUTE NRBC 0.00 0.00 - 0.01 K/uL    NEUTROPHILS 74 32 - 75 %    LYMPHOCYTES 15 12 - 49 %    MONOCYTES 10 5 - 13 %    EOSINOPHILS 1 0 - 7 %    BASOPHILS 0 0 - 1 %    IMMATURE GRANULOCYTES 0 0 - 0.5 %    ABS. NEUTROPHILS 6.1 1.8 - 8.0 K/UL    ABS. LYMPHOCYTES 1.2 0.8 - 3.5 K/UL    ABS. MONOCYTES 0.9 0.0 - 1.0 K/UL    ABS. EOSINOPHILS 0.1 0.0 - 0.4 K/UL    ABS. BASOPHILS 0.0 0.0 - 0.1 K/UL    ABS. IMM.  GRANS. 0.0 0.00 - 0.04 K/UL    DF AUTOMATED     METABOLIC PANEL, BASIC    Collection Time: 06/06/21  2:15 PM   Result Value Ref Range    Sodium 133 (L) 136 - 145 mmol/L    Potassium 4.3 3.5 - 5.1 mmol/L    Chloride 100 97 - 108 mmol/L    CO2 28 21 - 32 mmol/L    Anion gap 5 5 - 15 mmol/L    Glucose 104 (H) 65 - 100 mg/dL    BUN 16 6 - 20 mg/dL    Creatinine 0.87 0.55 - 1.02 mg/dL    BUN/Creatinine ratio 18 12 - 20      GFR est AA >60 >60 ml/min/1.73m2    GFR est non-AA >60 >60 ml/min/1.73m2    Calcium 9.8 8.5 - 10.1 mg/dL       Radiologic Studies - CT NECK SOFT TISSUE W CONT   Final Result   No demonstrated abnormality. CT Results  (Last 48 hours)                 06/06/21 1441  CT NECK SOFT TISSUE W CONT Final result    Impression:  No demonstrated abnormality. Narrative:  Exam: CT soft tissue neck with intravenous contrast       Comparison: Cervical spine and chest CT 6/1/2021       Technique: IV contrast (100 cc [id number]-370) CT of the soft tissues of the neck   was performed. Axial projections were converted to coronal and sagittal   reconstructions. Findings: The airway appears clear. The epiglottis and aryepiglottic folds are within normal.    Prevertebral soft tissues are within normal.    Waldeyer's Ring is normal.    Parapharyngeal fat planes are clear. The Fossae of Rosenmuller appear clear. The subglottic trachea appears clear. The parotid glands and submandibular glands are within normal.    The floor of the mouth is normal.    There is no cervical soft tissue mass or adenopathy. Stable multilevel cervical spine degenerative disease. The carotid arterial trees appear clear. The internal jugular veins are clear. The thyroid gland is unremarkable. The paranasal sinus air cells appear clear. The meg medi and the mastoid air cells are clear. Stable right upper lobe subcentimeter noncalcified pulmonary nodules. CXR Results  (Last 48 hours)      None               If you feel that you have not received excellent quality care or timely care, please ask to speak to the nurse manager. Please choose us in the future for your continued health care needs. ------------------------------------------------------------------------------------------------------------  The exam and treatment you received in the Emergency Department were for an urgent problem and are not intended as complete care.  It is important that you follow-up with a doctor, nurse practitioner, or physician assistant to: (1) confirm your diagnosis,  (2) re-evaluation of changes in your illness and treatment, and  (3) for ongoing care. If your symptoms become worse or you do not improve as expected and you are unable to reach your usual health care provider, you should return to the Emergency Department. We are available 24 hours a day. Please take your discharge instructions with you when you go to your follow-up appointment. If you have any problem arranging a follow-up appointment, contact the Emergency Department immediately. If a prescription has been provided, please have it filled as soon as possible to prevent a delay in treatment. Read the entire medication instruction sheet provided to you by the pharmacy. If you have any questions or reservations about taking the medication due to side effects or interactions with other medications, please call your primary care physician or contact the ER to speak with the charge nurse. Make an appointment with your family doctor or the physician you were referred to for follow-up of this visit as instructed on your discharge paperwork, as this is a mandatory follow-up. Return to the ER if you are unable to be seen or if you are unable to be seen in a timely manner. If you have any problem arranging the follow-up visit, contact the Emergency Department immediately.

## 2021-06-07 ENCOUNTER — HOSPITAL ENCOUNTER (INPATIENT)
Age: 86
LOS: 6 days | Discharge: NURSING, REHAB OR LONG-TERM CARE | DRG: 391 | End: 2021-06-14
Attending: EMERGENCY MEDICINE | Admitting: HOSPITALIST
Payer: MEDICARE

## 2021-06-07 ENCOUNTER — APPOINTMENT (OUTPATIENT)
Dept: MRI IMAGING | Age: 86
DRG: 391 | End: 2021-06-07
Attending: EMERGENCY MEDICINE
Payer: MEDICARE

## 2021-06-07 ENCOUNTER — APPOINTMENT (OUTPATIENT)
Dept: CT IMAGING | Age: 86
DRG: 391 | End: 2021-06-07
Attending: EMERGENCY MEDICINE
Payer: MEDICARE

## 2021-06-07 DIAGNOSIS — R13.10 DYSPHAGIA, UNSPECIFIED TYPE: Primary | ICD-10-CM

## 2021-06-07 DIAGNOSIS — R03.0 ELEVATED BLOOD PRESSURE READING: ICD-10-CM

## 2021-06-07 LAB
ALBUMIN SERPL-MCNC: 4 G/DL (ref 3.5–5)
ALBUMIN/GLOB SERPL: 0.9 {RATIO} (ref 1.1–2.2)
ALP SERPL-CCNC: 96 U/L (ref 45–117)
ALT SERPL-CCNC: 16 U/L (ref 12–78)
ANION GAP SERPL CALC-SCNC: 9 MMOL/L (ref 5–15)
APPEARANCE UR: ABNORMAL
AST SERPL W P-5'-P-CCNC: 13 U/L (ref 15–37)
BACTERIA URNS QL MICRO: NEGATIVE /HPF
BASOPHILS # BLD: 0 K/UL (ref 0–0.1)
BASOPHILS NFR BLD: 0 % (ref 0–1)
BILIRUB SERPL-MCNC: 0.7 MG/DL (ref 0.2–1)
BILIRUB UR QL: NEGATIVE
BUN SERPL-MCNC: 26 MG/DL (ref 6–20)
BUN/CREAT SERPL: 25 (ref 12–20)
CA-I BLD-MCNC: 10.2 MG/DL (ref 8.5–10.1)
CHLORIDE SERPL-SCNC: 100 MMOL/L (ref 97–108)
CO2 SERPL-SCNC: 27 MMOL/L (ref 21–32)
COLOR UR: ABNORMAL
CREAT SERPL-MCNC: 1.06 MG/DL (ref 0.55–1.02)
DIFFERENTIAL METHOD BLD: ABNORMAL
EOSINOPHIL # BLD: 0 K/UL (ref 0–0.4)
EOSINOPHIL NFR BLD: 0 % (ref 0–7)
ERYTHROCYTE [DISTWIDTH] IN BLOOD BY AUTOMATED COUNT: 13.2 % (ref 11.5–14.5)
GLOBULIN SER CALC-MCNC: 4.4 G/DL (ref 2–4)
GLUCOSE SERPL-MCNC: 96 MG/DL (ref 65–100)
GLUCOSE UR STRIP.AUTO-MCNC: NEGATIVE MG/DL
HCT VFR BLD AUTO: 38.7 % (ref 35–47)
HGB BLD-MCNC: 13 G/DL (ref 11.5–16)
HGB UR QL STRIP: NEGATIVE
IMM GRANULOCYTES # BLD AUTO: 0.1 K/UL (ref 0–0.04)
IMM GRANULOCYTES NFR BLD AUTO: 0 % (ref 0–0.5)
INR PPP: 1.1 (ref 0.9–1.1)
KETONES UR QL STRIP.AUTO: NEGATIVE MG/DL
LEUKOCYTE ESTERASE UR QL STRIP.AUTO: NEGATIVE
LYMPHOCYTES # BLD: 1.5 K/UL (ref 0.8–3.5)
LYMPHOCYTES NFR BLD: 12 % (ref 12–49)
MAGNESIUM SERPL-MCNC: 2.2 MG/DL (ref 1.6–2.4)
MCH RBC QN AUTO: 29.9 PG (ref 26–34)
MCHC RBC AUTO-ENTMCNC: 33.6 G/DL (ref 30–36.5)
MCV RBC AUTO: 89 FL (ref 80–99)
MONOCYTES # BLD: 1.2 K/UL (ref 0–1)
MONOCYTES NFR BLD: 9 % (ref 5–13)
NEUTS SEG # BLD: 10.1 K/UL (ref 1.8–8)
NEUTS SEG NFR BLD: 79 % (ref 32–75)
NITRITE UR QL STRIP.AUTO: NEGATIVE
NRBC # BLD: 0 K/UL (ref 0–0.01)
NRBC BLD-RTO: 0 PER 100 WBC
PH UR STRIP: 5 [PH] (ref 5–8)
PLATELET # BLD AUTO: 405 K/UL (ref 150–400)
PMV BLD AUTO: 10 FL (ref 8.9–12.9)
POTASSIUM SERPL-SCNC: 3.9 MMOL/L (ref 3.5–5.1)
PROT SERPL-MCNC: 8.4 G/DL (ref 6.4–8.2)
PROT UR STRIP-MCNC: NEGATIVE MG/DL
PROTHROMBIN TIME: 13.9 SEC (ref 11.9–14.7)
RBC # BLD AUTO: 4.35 M/UL (ref 3.8–5.2)
RBC #/AREA URNS HPF: ABNORMAL /HPF (ref 0–5)
SODIUM SERPL-SCNC: 136 MMOL/L (ref 136–145)
SP GR UR REFRACTOMETRY: <1.005 (ref 1–1.03)
TROPONIN I SERPL-MCNC: <0.05 NG/ML
UA: UC IF INDICATED,UAUC: ABNORMAL
UROBILINOGEN UR QL STRIP.AUTO: 0.1 EU/DL (ref 0.1–1)
WBC # BLD AUTO: 12.9 K/UL (ref 3.6–11)
WBC URNS QL MICRO: ABNORMAL /HPF (ref 0–4)

## 2021-06-07 PROCEDURE — 94760 N-INVAS EAR/PLS OXIMETRY 1: CPT

## 2021-06-07 PROCEDURE — 80053 COMPREHEN METABOLIC PANEL: CPT

## 2021-06-07 PROCEDURE — 99218 HC RM OBSERVATION: CPT

## 2021-06-07 PROCEDURE — 93005 ELECTROCARDIOGRAM TRACING: CPT

## 2021-06-07 PROCEDURE — 36415 COLL VENOUS BLD VENIPUNCTURE: CPT

## 2021-06-07 PROCEDURE — 84484 ASSAY OF TROPONIN QUANT: CPT

## 2021-06-07 PROCEDURE — 74011250636 HC RX REV CODE- 250/636: Performed by: INTERNAL MEDICINE

## 2021-06-07 PROCEDURE — 81001 URINALYSIS AUTO W/SCOPE: CPT

## 2021-06-07 PROCEDURE — 70551 MRI BRAIN STEM W/O DYE: CPT

## 2021-06-07 PROCEDURE — 85610 PROTHROMBIN TIME: CPT

## 2021-06-07 PROCEDURE — 85025 COMPLETE CBC W/AUTO DIFF WBC: CPT

## 2021-06-07 PROCEDURE — 74011250637 HC RX REV CODE- 250/637: Performed by: EMERGENCY MEDICINE

## 2021-06-07 PROCEDURE — 70450 CT HEAD/BRAIN W/O DYE: CPT

## 2021-06-07 PROCEDURE — 99284 EMERGENCY DEPT VISIT MOD MDM: CPT

## 2021-06-07 PROCEDURE — 96374 THER/PROPH/DIAG INJ IV PUSH: CPT

## 2021-06-07 PROCEDURE — 83735 ASSAY OF MAGNESIUM: CPT

## 2021-06-07 PROCEDURE — 74011250636 HC RX REV CODE- 250/636: Performed by: EMERGENCY MEDICINE

## 2021-06-07 RX ORDER — SODIUM CHLORIDE 9 MG/ML
50 INJECTION, SOLUTION INTRAVENOUS CONTINUOUS
Status: DISCONTINUED | OUTPATIENT
Start: 2021-06-07 | End: 2021-06-08

## 2021-06-07 RX ORDER — POLYETHYLENE GLYCOL 3350 17 G/17G
17 POWDER, FOR SOLUTION ORAL DAILY PRN
Status: DISCONTINUED | OUTPATIENT
Start: 2021-06-07 | End: 2021-06-15 | Stop reason: HOSPADM

## 2021-06-07 RX ORDER — ENOXAPARIN SODIUM 100 MG/ML
30 INJECTION SUBCUTANEOUS DAILY
Status: DISCONTINUED | OUTPATIENT
Start: 2021-06-08 | End: 2021-06-15 | Stop reason: HOSPADM

## 2021-06-07 RX ORDER — SODIUM CHLORIDE 0.9 % (FLUSH) 0.9 %
5-40 SYRINGE (ML) INJECTION AS NEEDED
Status: DISCONTINUED | OUTPATIENT
Start: 2021-06-07 | End: 2021-06-15 | Stop reason: HOSPADM

## 2021-06-07 RX ORDER — DIPHENHYDRAMINE HYDROCHLORIDE 50 MG/ML
25 INJECTION, SOLUTION INTRAMUSCULAR; INTRAVENOUS
Status: COMPLETED | OUTPATIENT
Start: 2021-06-07 | End: 2021-06-07

## 2021-06-07 RX ORDER — SODIUM CHLORIDE 0.9 % (FLUSH) 0.9 %
5-40 SYRINGE (ML) INJECTION EVERY 8 HOURS
Status: DISCONTINUED | OUTPATIENT
Start: 2021-06-07 | End: 2021-06-13

## 2021-06-07 RX ORDER — ONDANSETRON 2 MG/ML
4 INJECTION INTRAMUSCULAR; INTRAVENOUS
Status: DISCONTINUED | OUTPATIENT
Start: 2021-06-07 | End: 2021-06-15 | Stop reason: HOSPADM

## 2021-06-07 RX ORDER — HYDRALAZINE HYDROCHLORIDE 20 MG/ML
10 INJECTION INTRAMUSCULAR; INTRAVENOUS
Status: DISCONTINUED | OUTPATIENT
Start: 2021-06-07 | End: 2021-06-15 | Stop reason: HOSPADM

## 2021-06-07 RX ORDER — TRAZODONE HYDROCHLORIDE 50 MG/1
100 TABLET ORAL
Status: DISCONTINUED | OUTPATIENT
Start: 2021-06-07 | End: 2021-06-08

## 2021-06-07 RX ORDER — ASPIRIN 325 MG
325 TABLET ORAL DAILY
Status: DISCONTINUED | OUTPATIENT
Start: 2021-06-08 | End: 2021-06-15 | Stop reason: HOSPADM

## 2021-06-07 RX ORDER — ACETAMINOPHEN 650 MG/1
650 SUPPOSITORY RECTAL
Status: DISCONTINUED | OUTPATIENT
Start: 2021-06-07 | End: 2021-06-15 | Stop reason: HOSPADM

## 2021-06-07 RX ORDER — MELATONIN
1000 DAILY
Status: DISCONTINUED | OUTPATIENT
Start: 2021-06-08 | End: 2021-06-15 | Stop reason: HOSPADM

## 2021-06-07 RX ORDER — ONDANSETRON 4 MG/1
4 TABLET, ORALLY DISINTEGRATING ORAL
Status: DISCONTINUED | OUTPATIENT
Start: 2021-06-07 | End: 2021-06-15 | Stop reason: HOSPADM

## 2021-06-07 RX ORDER — ACETAMINOPHEN 325 MG/1
650 TABLET ORAL
Status: DISCONTINUED | OUTPATIENT
Start: 2021-06-07 | End: 2021-06-15 | Stop reason: HOSPADM

## 2021-06-07 RX ORDER — HYDROXYZINE PAMOATE 25 MG/1
25 CAPSULE ORAL
Status: DISCONTINUED | OUTPATIENT
Start: 2021-06-07 | End: 2021-06-15 | Stop reason: HOSPADM

## 2021-06-07 RX ORDER — AMLODIPINE BESYLATE 5 MG/1
5 TABLET ORAL DAILY
Status: DISCONTINUED | OUTPATIENT
Start: 2021-06-08 | End: 2021-06-12

## 2021-06-07 RX ORDER — DIPHENHYDRAMINE HCL 25 MG
50 CAPSULE ORAL
Status: DISCONTINUED | OUTPATIENT
Start: 2021-06-07 | End: 2021-06-07

## 2021-06-07 RX ORDER — LOSARTAN POTASSIUM 50 MG/1
50 TABLET ORAL DAILY
Status: DISCONTINUED | OUTPATIENT
Start: 2021-06-08 | End: 2021-06-12

## 2021-06-07 RX ADMIN — DIPHENHYDRAMINE HYDROCHLORIDE 50 MG: 25 CAPSULE ORAL at 14:20

## 2021-06-07 RX ADMIN — SODIUM CHLORIDE 50 ML/HR: 9 INJECTION, SOLUTION INTRAVENOUS at 20:37

## 2021-06-07 RX ADMIN — DIPHENHYDRAMINE HYDROCHLORIDE 25 MG: 50 INJECTION, SOLUTION INTRAMUSCULAR; INTRAVENOUS at 18:48

## 2021-06-07 RX ADMIN — Medication 10 ML: at 22:00

## 2021-06-07 RX ADMIN — SODIUM CHLORIDE 500 ML: 9 INJECTION, SOLUTION INTRAVENOUS at 19:19

## 2021-06-07 NOTE — ED PROVIDER NOTES
EMERGENCY DEPARTMENT HISTORY AND PHYSICAL EXAM      Date: 6/7/2021  Patient Name: Lizandro Cervantes    History of Presenting Illness     Chief Complaint   Patient presents with    Other     Difficulty swallowing        History Provided By: Patient    HPI: Lizandro Cervantes, 80 y.o. female with PMHx as noted below presents the emergency department chief complaint of dysarthria and dysphagia. Patient states symptoms started approximately 2 days ago and have been waxing and waning in intensity. Patient notes that today her speech problem seems to be worse so came back to the emergency department. Of note was here yesterday with reassuring work-up. Patient otherwise denying any focal weakness or numbness. Pt denies any other alleviating or exacerbating factors. Additionally, pt specifically denies any recent fever, chills, headache, nausea, vomiting, abdominal pain, CP, SOB, lightheadedness, dizziness, numbness,  BLE swelling, heart palpitations, urinary sxs, diarrhea, constipation, melena, hematochezia, cough, or congestion.   PCP: Erin Hurtado MD    Current Facility-Administered Medications   Medication Dose Route Frequency Provider Last Rate Last Admin    0.9% sodium chloride infusion  50 mL/hr IntraVENous CONTINUOUS Bel Wilson MD        [START ON 6/8/2021] amLODIPine (NORVASC) tablet 5 mg  5 mg Oral DAILY Bel Wilson MD        [START ON 6/8/2021] aspirin tablet 325 mg  325 mg Oral DAILY Bel Wilson MD        [START ON 6/8/2021] cholecalciferol (VITAMIN D3) (1000 Units /25 mcg) tablet 1,000 Units  1,000 Units Oral DAILY Bel Wilson MD        hydrOXYzine pamoate (VISTARIL) capsule 25 mg  25 mg Oral TID PRN Valerie Garner MD        [START ON 6/8/2021] losartan (COZAAR) tablet 50 mg  50 mg Oral DAILY Valerie Garner MD        [facility] [place] by provider] traZODone (DESYREL) tablet 100 mg  100 [facility] Kortney Henriquez [facility]        hydrALAZINE (APRESOLINE) 20 mg/mL injection 10 mg  10 mg IntraVENous Q6H PRN Valerie Garner MD        sodium chloride (NS) flush 5-40 mL  5-40 mL IntraVENous Q8H David Wilson MD        sodium chloride (NS) flush 5-40 mL  5-40 mL IntraVENous PRN Max Calix, Bel Villegas MD        acetaminophen (TYLENOL) tablet 650 mg  650 mg Oral Q6H PRN Max Eye, Bel Villegas MD        Or    acetaminophen (TYLENOL) suppository 650 mg  650 mg Rectal Q6H PRN Bel Wilson MD        polyethylene glycol [facility]) packet 17 g  17 g Oral DAILY PRN Valerie Garner MD        ondansetron [facility]) tablet 4 mg  4 mg Oral Q8H PRN Valerie Garner MD        Or    ondansetron [facility]) injection 4 mg  4 mg IntraVENous Q6H PRN Valerie Garner MD        [START ON 6/8/2021] enoxaparin (LOVENOX) injection 30 mg  30 mg SubCUTAneous Bel Huerta MD           Past History     Past Medical History:  Past Medical History:   Diagnosis Date    Arthritis     GERD (gastroesophageal reflux disease)     Hypertension     Other ill-defined conditions(799.89)     Diverticulitis    Psychiatric disorder     depression       Past Surgical History:  Past Surgical History:   Procedure Laterality Date    HX GI  2010    Colon resection    HX GYN  1973    Hysterectomy    HX HEENT  2013    Laser eye surgery on left    HX ORTHOPAEDIC  1995    Left rotator cuff surgery    HX ORTHOPAEDIC      right 2nd finger       Family History:  Family History   Problem Relation Age of Onset    Lung Disease Mother     Diabetes Sister     Heart Disease Sister        Social History:  Social History     Tobacco Use    Smoking status: Former Smoker    Smokeless tobacco: Former User   Substance Use Topics    Alcohol use: No    Drug use: No       Allergies:   Allergies   Allergen Reactions    Penicillin G Anaphylaxis    Codeine Other (comments) I can't walk and don't have control over my body         Review of Systems   Review of Systems  Constitutional: Negative for fever, chills, and fatigue. HENT: Negative for congestion, sore throat, rhinorrhea, sneezing and neck stiffness   Eyes: Negative for discharge and redness. Respiratory: Negative for  shortness of breath, wheezing   Cardiovascular: Negative for chest pain, palpitations   Gastrointestinal: Negative for nausea, vomiting, abdominal pain, constipation, diarrhea and blood in stool. Genitourinary: Negative for dysuria, urgency, frequency, hematuria, flank pain, decreased urine volume, discharge,   Musculoskeletal: Negative for myalgias or joint pain . Skin: Negative for rash or lesions . Neurological: Negative weakness, light-headedness, numbness and headaches. Physical Exam   Physical Exam    GENERAL: alert and oriented, no acute distress  EYES: PEERL, No injection, discharge or icterus. ENT: Mucous membranes pink and moist.  NECK: Supple  LUNGS: Airway patent. Non-labored respirations. Breath sounds clear with good air entry bilaterally. HEART: Regular rate and rhythm. No peripheral edema  ABDOMEN: Non-distended and non-tender, without guarding or rebound. SKIN:  warm, dry  EXTREMITIES: Without swelling, tenderness or deformity, symmetric with normal ROM  NEUROLOGICAL: Alert, oriented, slurred speech, strength 5/5 bilateral upper and lower extremities, sensation intact and equal throughout to light touch.       Diagnostic Study Results     Labs -     Recent Results (from the past 12 hour(s))   CBC WITH AUTOMATED DIFF    Collection Time: 06/07/21  2:45 PM   Result Value Ref Range    WBC 12.9 (H) 3.6 - 11.0 K/uL    RBC 4.35 3.80 - 5.20 M/uL    HGB 13.0 11.5 - 16.0 g/dL    HCT 38.7 35.0 - 47.0 %    MCV 89.0 80.0 - 99.0 FL    MCH 29.9 26.0 - 34.0 PG    MCHC 33.6 30.0 - 36.5 g/dL    RDW 13.2 11.5 - 14.5 %    PLATELET 463 (H) 704 - 400 K/uL    MPV 10.0 8.9 - 12.9 FL    NRBC 0.0 0. 0  WBC    ABSOLUTE NRBC 0.00 0.00 - 0.01 K/uL    NEUTROPHILS 79 (H) 32 - 75 %    LYMPHOCYTES 12 12 - 49 %    MONOCYTES 9 5 - 13 %    EOSINOPHILS 0 0 - 7 %    BASOPHILS 0 0 - 1 %    IMMATURE GRANULOCYTES 0 0 - 0.5 %    ABS. NEUTROPHILS 10.1 (H) 1.8 - 8.0 K/UL    ABS. LYMPHOCYTES 1.5 0.8 - 3.5 K/UL    ABS. MONOCYTES 1.2 (H) 0.0 - 1.0 K/UL    ABS. EOSINOPHILS 0.0 0.0 - 0.4 K/UL    ABS. BASOPHILS 0.0 0.0 - 0.1 K/UL    ABS. IMM. GRANS. 0.1 (H) 0.00 - 0.04 K/UL    DF AUTOMATED     METABOLIC PANEL, COMPREHENSIVE    Collection Time: 06/07/21  2:45 PM   Result Value Ref Range    Sodium 136 136 - 145 mmol/L    Potassium 3.9 3.5 - 5.1 mmol/L    Chloride 100 97 - 108 mmol/L    CO2 27 21 - 32 mmol/L    Anion gap 9 5 - 15 mmol/L    Glucose 96 65 - 100 mg/dL    BUN 26 (H) 6 - 20 mg/dL    Creatinine 1.06 (H) 0.55 - 1.02 mg/dL    BUN/Creatinine ratio 25 (H) 12 - 20      GFR est AA 60 (L) >60 ml/min/1.73m2    GFR est non-AA 49 (L) >60 ml/min/1.73m2    Calcium 10.2 (H) 8.5 - 10.1 mg/dL    Bilirubin, total 0.7 0.2 - 1.0 mg/dL    AST (SGOT) 13 (L) 15 - 37 U/L    ALT (SGPT) 16 12 - 78 U/L    Alk.  phosphatase 96 45 - 117 U/L    Protein, total 8.4 (H) 6.4 - 8.2 g/dL    Albumin 4.0 3.5 - 5.0 g/dL    Globulin 4.4 (H) 2.0 - 4.0 g/dL    A-G Ratio 0.9 (L) 1.1 - 2.2     TROPONIN I    Collection Time: 06/07/21  2:45 PM   Result Value Ref Range    Troponin-I, Qt. <0.05 <0.05 ng/mL   MAGNESIUM    Collection Time: 06/07/21  2:45 PM   Result Value Ref Range    Magnesium 2.2 1.6 - 2.4 mg/dL   URINALYSIS W/ REFLEX CULTURE    Collection Time: 06/07/21  2:50 PM    Specimen: Urine   Result Value Ref Range    Color Yellow/Straw      Appearance Turbid (A) Clear      Specific gravity <1.005 1.003 - 1.030    pH (UA) 5.0 5.0 - 8.0      Protein Negative Negative mg/dL    Glucose Negative Negative mg/dL    Ketone Negative Negative mg/dL    Bilirubin Negative Negative      Blood Negative Negative      Urobilinogen 0.1 0.1 - 1.0 EU/dL    Nitrites Negative Negative      Leukocyte Esterase Negative Negative      UA:UC IF INDICATED Culture not indicated by UA result Culture not indicated by UA result      WBC 0-4 0 - 4 /hpf    RBC 0-5 0 - 5 /hpf    Bacteria Negative Negative /hpf   PROTHROMBIN TIME + INR    Collection Time: 06/07/21  2:55 PM   Result Value Ref Range    Prothrombin time 13.9 11.9 - 14.7 sec    INR 1.1 0.9 - 1.1         Radiologic Studies -   MRI BRAIN WO CONT   Final Result   1. No acute finding. 2. Mild changes of suspected small vessel ischemic disease with diffuse cerebral   atrophy. 3. 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 CT HEAD WO CONT   Final Result   1. No acute intracranial findings. 2. Atrophy and small vessel ischemic disease. CT Results  (Last 48 hours)               06/07/21 1510  CT HEAD WO CONT Final result    Impression:  1. No acute intracranial findings. 2. Atrophy and small vessel ischemic disease. Narrative:  Dysarthria. Comparison head CT 6/1/2021. Technique: Axial images head without IV contrast, with multiplanar reformatting. Multiplanar reformatting. Dose reduction: All CT scans at this facility are performed using dose reduction   optimization techniques as appropriate to a performed exam including the   following: Automated exposure control, adjustments of the mA and/or kV according   to patient's size, or use of iterative reconstruction technique. Findings: Bilateral periventricular white matter hypodensity. . No mass effect,   extra-axial fluid collection or hemorrhage. Normal position craniocervical   junction. Atrophy. Small mucosal thickening in the left sphenoid sinus   unchanged. Mastoid air cells are unopacified. Calvarium intact. 06/06/21 1441  CT NECK SOFT TISSUE W CONT Final result    Impression:  No demonstrated abnormality.         Narrative:  Exam: CT soft tissue neck with intravenous contrast Comparison: Cervical spine and chest CT 6/1/2021       Technique: IV contrast (100 cc Isovue-370) CT of the soft tissues of the neck   was performed. Axial projections were converted to coronal and sagittal   reconstructions. Findings: The airway appears clear. The epiglottis and aryepiglottic folds are within normal.    Prevertebral soft tissues are within normal.    Waldeyer's Ring is normal.    Parapharyngeal fat planes are clear. The Fossae of Rosenmuller appear clear. The subglottic trachea appears clear. The parotid glands and submandibular glands are within normal.    The floor of the mouth is normal.    There is no cervical soft tissue mass or adenopathy. Stable multilevel cervical spine degenerative disease. The carotid arterial trees appear clear. The internal jugular veins are clear. The thyroid gland is unremarkable. The paranasal sinus air cells appear clear. The meg medi and the mastoid air cells are clear. Stable right upper lobe subcentimeter noncalcified pulmonary nodules. CXR Results  (Last 48 hours)    None            Medical Decision Making     IXenia MD am the first provider for this patient and am the attending of record for this patient encounter. I reviewed the vital signs, available nursing notes, past medical history, past surgical history, family history and social history. Vital Signs-Reviewed the patient's vital signs. Patient Vitals for the past 12 hrs:   Temp Pulse Resp BP SpO2   06/07/21 2135 98 °F (36.7 °C) 100 18 (!) 194/94 96 %   06/07/21 1900 98.4 °F (36.9 °C) 92 18 137/60 99 %   06/07/21 1252 98.9 °F (37.2 °C) 88 18 (!) 170/83 99 %           Records Reviewed: Nursing Notes and Old Medical Records    Provider Notes (Medical Decision Making): On presentation, the patient is well appearing, in no acute distress with reassuring vital signs.   Differential includes esophageal dysmotility, dysphagia, stroke, tardive dyskinesia, esophageal mass. Labs were overall reassuring although patient does appear to be mildly dehydrated. Reviewed recent work-up which showed no acute findings on imaging. Obtain brain MRI which showed no acute ischemic stroke. Uncertain as to the etiology of the patient's dysphagia at this time as well plan to admit for further work-up and management. ED Course:   Initial assessment performed. The patients presenting problems have been discussed, and they are in agreement with the care plan formulated and outlined with them. I have encouraged them to ask questions as they arise throughout their visit. PROGRESS:  The patient has been re-evaluated. Reviewed available results with patient and have counseled them on diagnosis and care plan. They have expressed understanding, and all their questions have been answered. They agree with plan for admission. CONSULT:  Molly Dos Santos MD spoke with the hospitalist.  Discussed HPI and PE, available diagnostic tests and clinical findings. He is in agreement with care plans as outlined and will evaluate for admission     Admit Note  Patient is being admitted to the hospitalist.  The results of their tests and reasons for their admission have been discussed with them and/or available family. They convey agreement and understanding for the need to be admitted and for their admission diagnosis. Consultation has been made with the inpatient physician specialist for hospitalization. Disposition:  admission    PLAN:  1. Admit     Diagnosis     Clinical Impression:   1. Dysphagia, unspecified type    2. Elevated blood pressure reading        Please note that this dictation was completed with Dragon, computer voice recognition software. Quite often unanticipated grammatical, syntax, homophones, and other interpretive errors are inadvertently transcribed by the computer software. Please disregard these errors.   Additionally, please excuse any errors that have escaped final proofreading.

## 2021-06-08 ENCOUNTER — ANESTHESIA (OUTPATIENT)
Dept: ENDOSCOPY | Age: 86
DRG: 391 | End: 2021-06-08
Payer: MEDICARE

## 2021-06-08 ENCOUNTER — ANESTHESIA EVENT (OUTPATIENT)
Dept: ENDOSCOPY | Age: 86
DRG: 391 | End: 2021-06-08
Payer: MEDICARE

## 2021-06-08 ENCOUNTER — APPOINTMENT (OUTPATIENT)
Dept: ENDOSCOPY | Age: 86
DRG: 391 | End: 2021-06-08
Attending: INTERNAL MEDICINE
Payer: MEDICARE

## 2021-06-08 PROBLEM — R47.1 DYSARTHRIA: Status: ACTIVE | Noted: 2021-06-08

## 2021-06-08 LAB
ANION GAP SERPL CALC-SCNC: 7 MMOL/L (ref 5–15)
ATRIAL RATE: 82 BPM
BASOPHILS # BLD: 0.1 K/UL (ref 0–0.1)
BASOPHILS NFR BLD: 0 % (ref 0–1)
BUN SERPL-MCNC: 26 MG/DL (ref 6–20)
BUN/CREAT SERPL: 33 (ref 12–20)
CA-I BLD-MCNC: 9 MG/DL (ref 8.5–10.1)
CALCULATED P AXIS, ECG09: 63 DEGREES
CALCULATED R AXIS, ECG10: 3 DEGREES
CALCULATED T AXIS, ECG11: 56 DEGREES
CHLORIDE SERPL-SCNC: 109 MMOL/L (ref 97–108)
CO2 SERPL-SCNC: 23 MMOL/L (ref 21–32)
CREAT SERPL-MCNC: 0.8 MG/DL (ref 0.55–1.02)
DIAGNOSIS, 93000: NORMAL
DIFFERENTIAL METHOD BLD: ABNORMAL
EOSINOPHIL # BLD: 0 K/UL (ref 0–0.4)
EOSINOPHIL NFR BLD: 0 % (ref 0–7)
ERYTHROCYTE [DISTWIDTH] IN BLOOD BY AUTOMATED COUNT: 13.4 % (ref 11.5–14.5)
GLUCOSE SERPL-MCNC: 74 MG/DL (ref 65–100)
HCT VFR BLD AUTO: 36.1 % (ref 35–47)
HGB BLD-MCNC: 11.4 G/DL (ref 11.5–16)
IMM GRANULOCYTES # BLD AUTO: 0.1 K/UL (ref 0–0.04)
IMM GRANULOCYTES NFR BLD AUTO: 0 % (ref 0–0.5)
LYMPHOCYTES # BLD: 2.3 K/UL (ref 0.8–3.5)
LYMPHOCYTES NFR BLD: 20 % (ref 12–49)
MAGNESIUM SERPL-MCNC: 2.1 MG/DL (ref 1.6–2.4)
MCH RBC QN AUTO: 29.4 PG (ref 26–34)
MCHC RBC AUTO-ENTMCNC: 31.6 G/DL (ref 30–36.5)
MCV RBC AUTO: 93 FL (ref 80–99)
MONOCYTES # BLD: 1.2 K/UL (ref 0–1)
MONOCYTES NFR BLD: 11 % (ref 5–13)
NEUTS SEG # BLD: 8 K/UL (ref 1.8–8)
NEUTS SEG NFR BLD: 69 % (ref 32–75)
NRBC # BLD: 0 K/UL (ref 0–0.01)
NRBC BLD-RTO: 0 PER 100 WBC
P-R INTERVAL, ECG05: 168 MS
PLATELET # BLD AUTO: 318 K/UL (ref 150–400)
PMV BLD AUTO: 10.5 FL (ref 8.9–12.9)
POTASSIUM SERPL-SCNC: 3.9 MMOL/L (ref 3.5–5.1)
Q-T INTERVAL, ECG07: 364 MS
QRS DURATION, ECG06: 70 MS
QTC CALCULATION (BEZET), ECG08: 425 MS
RBC # BLD AUTO: 3.88 M/UL (ref 3.8–5.2)
SODIUM SERPL-SCNC: 139 MMOL/L (ref 136–145)
VENTRICULAR RATE, ECG03: 82 BPM
WBC # BLD AUTO: 11.6 K/UL (ref 3.6–11)

## 2021-06-08 PROCEDURE — 74011250636 HC RX REV CODE- 250/636: Performed by: NURSE ANESTHETIST, CERTIFIED REGISTERED

## 2021-06-08 PROCEDURE — 97530 THERAPEUTIC ACTIVITIES: CPT

## 2021-06-08 PROCEDURE — 97161 PT EVAL LOW COMPLEX 20 MIN: CPT

## 2021-06-08 PROCEDURE — 83735 ASSAY OF MAGNESIUM: CPT

## 2021-06-08 PROCEDURE — 74011250636 HC RX REV CODE- 250/636: Performed by: INTERNAL MEDICINE

## 2021-06-08 PROCEDURE — 36415 COLL VENOUS BLD VENIPUNCTURE: CPT

## 2021-06-08 PROCEDURE — 99218 HC RM OBSERVATION: CPT

## 2021-06-08 PROCEDURE — 97165 OT EVAL LOW COMPLEX 30 MIN: CPT

## 2021-06-08 PROCEDURE — 65270000029 HC RM PRIVATE

## 2021-06-08 PROCEDURE — 0DJ08ZZ INSPECTION OF UPPER INTESTINAL TRACT, VIA NATURAL OR ARTIFICIAL OPENING ENDOSCOPIC: ICD-10-PCS | Performed by: INTERNAL MEDICINE

## 2021-06-08 PROCEDURE — 85025 COMPLETE CBC W/AUTO DIFF WBC: CPT

## 2021-06-08 PROCEDURE — 2709999900 HC NON-CHARGEABLE SUPPLY: Performed by: INTERNAL MEDICINE

## 2021-06-08 PROCEDURE — 74011000250 HC RX REV CODE- 250: Performed by: NURSE PRACTITIONER

## 2021-06-08 PROCEDURE — 76060000033 HC ANESTHESIA 1 TO 1.5 HR: Performed by: INTERNAL MEDICINE

## 2021-06-08 PROCEDURE — 92610 EVALUATE SWALLOWING FUNCTION: CPT

## 2021-06-08 PROCEDURE — 74011250637 HC RX REV CODE- 250/637: Performed by: OTOLARYNGOLOGY

## 2021-06-08 PROCEDURE — 76040000008: Performed by: INTERNAL MEDICINE

## 2021-06-08 PROCEDURE — 74011000250 HC RX REV CODE- 250: Performed by: NURSE ANESTHETIST, CERTIFIED REGISTERED

## 2021-06-08 PROCEDURE — 74011250637 HC RX REV CODE- 250/637: Performed by: NURSE PRACTITIONER

## 2021-06-08 PROCEDURE — 80048 BASIC METABOLIC PNL TOTAL CA: CPT

## 2021-06-08 RX ORDER — LIDOCAINE HYDROCHLORIDE 20 MG/ML
INJECTION, SOLUTION EPIDURAL; INFILTRATION; INTRACAUDAL; PERINEURAL AS NEEDED
Status: DISCONTINUED | OUTPATIENT
Start: 2021-06-08 | End: 2021-06-08 | Stop reason: HOSPADM

## 2021-06-08 RX ORDER — NYSTATIN 100000 [USP'U]/ML
500000 SUSPENSION ORAL 4 TIMES DAILY
Status: DISPENSED | OUTPATIENT
Start: 2021-06-08 | End: 2021-06-13

## 2021-06-08 RX ORDER — SODIUM CHLORIDE, SODIUM LACTATE, POTASSIUM CHLORIDE, CALCIUM CHLORIDE 600; 310; 30; 20 MG/100ML; MG/100ML; MG/100ML; MG/100ML
INJECTION, SOLUTION INTRAVENOUS
Status: DISCONTINUED | OUTPATIENT
Start: 2021-06-08 | End: 2021-06-08

## 2021-06-08 RX ORDER — OXYMETAZOLINE HCL 0.05 %
1 SPRAY, NON-AEROSOL (ML) NASAL ONCE
Status: COMPLETED | OUTPATIENT
Start: 2021-06-08 | End: 2021-06-08

## 2021-06-08 RX ORDER — DEXTROSE MONOHYDRATE AND SODIUM CHLORIDE 5; .45 G/100ML; G/100ML
50 INJECTION, SOLUTION INTRAVENOUS CONTINUOUS
Status: DISCONTINUED | OUTPATIENT
Start: 2021-06-08 | End: 2021-06-12

## 2021-06-08 RX ORDER — PROPOFOL 10 MG/ML
INJECTION, EMULSION INTRAVENOUS AS NEEDED
Status: DISCONTINUED | OUTPATIENT
Start: 2021-06-08 | End: 2021-06-08 | Stop reason: HOSPADM

## 2021-06-08 RX ORDER — SODIUM CHLORIDE 9 MG/ML
INJECTION, SOLUTION INTRAVENOUS
Status: DISCONTINUED | OUTPATIENT
Start: 2021-06-08 | End: 2021-06-08 | Stop reason: HOSPADM

## 2021-06-08 RX ORDER — NYSTATIN 100000 [USP'U]/ML
500000 SUSPENSION ORAL 4 TIMES DAILY
Status: DISCONTINUED | OUTPATIENT
Start: 2021-06-08 | End: 2021-06-08

## 2021-06-08 RX ORDER — LIDOCAINE HYDROCHLORIDE 40 MG/ML
SOLUTION TOPICAL AS NEEDED
Status: DISCONTINUED | OUTPATIENT
Start: 2021-06-08 | End: 2021-06-12

## 2021-06-08 RX ADMIN — PROPOFOL 60 MG: 10 INJECTION, EMULSION INTRAVENOUS at 14:18

## 2021-06-08 RX ADMIN — SODIUM CHLORIDE: 9 INJECTION, SOLUTION INTRAVENOUS at 14:15

## 2021-06-08 RX ADMIN — NYSTATIN 500000 UNITS: 100000 SUSPENSION ORAL at 20:13

## 2021-06-08 RX ADMIN — Medication 10 ML: at 20:08

## 2021-06-08 RX ADMIN — DEXTROSE AND SODIUM CHLORIDE 75 ML/HR: 5; 450 INJECTION, SOLUTION INTRAVENOUS at 17:08

## 2021-06-08 RX ADMIN — Medication 10 ML: at 16:00

## 2021-06-08 RX ADMIN — Medication 10 ML: at 05:29

## 2021-06-08 RX ADMIN — HYDRALAZINE HYDROCHLORIDE 10 MG: 20 INJECTION INTRAMUSCULAR; INTRAVENOUS at 23:46

## 2021-06-08 RX ADMIN — PROPOFOL 20 MG: 10 INJECTION, EMULSION INTRAVENOUS at 14:20

## 2021-06-08 RX ADMIN — LIDOCAINE HYDROCHLORIDE 100 MG: 20 INJECTION, SOLUTION EPIDURAL; INFILTRATION; INTRACAUDAL; PERINEURAL at 14:18

## 2021-06-08 RX ADMIN — Medication 1 SPRAY: at 17:00

## 2021-06-08 RX ADMIN — NYSTATIN 500000 UNITS: 100000 SUSPENSION ORAL at 18:28

## 2021-06-08 NOTE — PROGRESS NOTES
PHYSICAL THERAPY EVALUATION  Patient: Raejean Aschoff (89 y.o. female)  Date: 6/8/2021  Primary Diagnosis: Dysphagia [R13.10]  Procedure(s) (LRB):  ESOPHAGOGASTRODUODENOSCOPY (EGD) (N/A) Day of Surgery   Precautions: falls       ASSESSMENT  Patient is a 80year old female, who came to the ED with worsening speech and difficulty swallowing and admitted for dysphagia on 6/7/2021. PMH includes: arthritis, GERD, hypertension, diverticulitis, depression, colon resection and left rotator cuff surgery.     Based on the objective data described below, the patient presents with c/o dizziness with mobility, decreased activity tolerance, decreased standing balance, generalized deconditioning and increased need for assist with self care and functional mobility/transfers. Patient semi-supine upon PT/OT arrival and agreeable to working with therapy. Patient A&O x4 and per pt report, pt lives alone in a two story home with 6 ADITYA and MITCH handrails. Patient reports using SPC for ambulation, DME: SPC, tub bench and chair lift. Patient reported needing assistance from neighbor or granddaughter for IADLs but IND for ADLs. Patient SBA for bed mobility, SBA sup -> sit and scooting, SBA sit <> stand. Patient amb 30 feet bed <> bathroom with CGA, gait belt and RW; demonstrates slow, steady, step through gt pattern. Attempted to amb pt in hallway, however, pt reported fatigue following bathroom mobility and self care with OT and requested to sit in chair prior to returning to bed. PT noted increased RR while seated in chair, O2 stats 98% HR 126BPM, following chair to bed transfer O2 dropped to 94%, HR 112BPM. Patient would benefit from continued skilled PT services to address above deficits and improve safety and independence with amb and functional mobility/transfers. Recommend discharge to home with HHPT and 24/7 care vs. SNF when medically appropriate.  Patient with recent history of falls at home so would need increased family care for safe discharge. Current Level of Function Impacting Discharge (mobility/balance): SBA to CGA    Other factors to consider for discharge: multiple ED visits in past 1 month      PLAN :  Recommendations and Planned Interventions: bed mobility training, transfer training, gait training, therapeutic exercises, patient and family training/education and therapeutic activities      Frequency/Duration: Patient will be followed by physical therapy:  3 times a week to address goals.     Recommendation for discharge: (in order for the patient to meet his/her long term goals)  HHPT with 24/7 care vs SNF    This discharge recommendation:  Has been made in collaboration with the attending provider and/or case management    IF patient discharges home will need the following DME: none         SUBJECTIVE:   Patient stated my heads not right because I moved too fast.    OBJECTIVE DATA SUMMARY:   HISTORY:    Past Medical History:   Diagnosis Date    Arthritis     GERD (gastroesophageal reflux disease)     Hypertension     Other ill-defined conditions(799.89)     Diverticulitis    Psychiatric disorder     depression     Past Surgical History:   Procedure Laterality Date    HX GI  2010    Colon resection    HX GYN  1973    Hysterectomy    HX HEENT  2013    Laser eye surgery on left    HX ORTHOPAEDIC  1995    Left rotator cuff surgery    HX ORTHOPAEDIC      right 2nd finger       Home Situation  Home Environment: Private residence  # Steps to Enter: 6  Rails to Enter: Yes  Hand Rails : Bilateral  One/Two Story Residence: Two story  Lift Chair Available: Yes  Living Alone: Yes  Support Systems: Family member(s), Friends \ neighbors (granddaughter)  Patient Expects to be Discharged to[de-identified] [employer]  Current DME Used/Available at Home: Tub transfer bench, Cane, straight  Tub or Shower Type: Tub/Shower combination    EXAMINATION/PRESENTATION/DECISION MAKING:   Critical Behavior:  Neurologic State: Alert  Orientation Level: Oriented X4  Cognition: Follows commands  Safety/Judgement: Fall prevention  Hearing: Auditory  Auditory Impairment: None  Skin:  Intact where visible   Edema: none noted   Range Of Motion:  AROM: Within functional limits                       Strength:    Strength: Generally decreased, functional (BLE grossly 3+/5 except DF 4/5)                    Tone & Sensation:   Tone: Normal           Functional Mobility:  Bed Mobility:  Rolling: Stand-by assistance  Supine to Sit: Stand-by assistance     Scooting: Stand-by assistance  Transfers:  Sit to Stand: Stand-by assistance  Stand to Sit: Stand-by assistance        Bed to Chair: Contact guard assistance              Balance:   Sitting: Intact  Standing: Impaired  Standing - Static: Good  Standing - Dynamic : Fair;Occasional  Ambulation/Gait Training:  Distance (ft): 25 Feet (ft)  Assistive Device: Gait belt;Walker, rolling  Ambulation - Level of Assistance: Contact guard assistance     Gait Description (WDL): Exceptions to WDL           Base of Support: Narrowed     Speed/Clau: Slow        Therapeutic Exercises:   Not completed this session    Functional Measure:  [address] [facility] [address] [address] AM-PAC 6 Clicks         Basic Mobility Inpatient Short Form  How much difficulty does the patient currently have. .. Unable A Lot A Little None   1. Turning over in bed (including adjusting bedclothes, sheets and blankets)? [] 1   [] 2   [] 3   [x] 4   2. Sitting down on and standing up from a chair with arms ( e.g., wheelchair, bedside commode, etc.)   [] 1   [] 2   [x] 3   [] 4   3. Moving from lying on back to sitting on the side of the bed? [] 1   [] 2   [] 3   [x] 4          How much help from another person does the patient currently need. .. Total A Lot A Little None   4. Moving to and from a bed to a chair (including a wheelchair)? [] 1   [] 2   [x] 3   [] 4   5. Need to walk in hospital room? [] 1   [] 2   [x] 3   [] 4   6. Climbing 3-5 steps with a railing?    [] 1   [] 2 [x] 3   [] 4   © , Trustees of [facility] [employer], under license to [employer]. All rights reserved     Score:  Initial:  Most Recent: X (Date: 21 )   Interpretation of Tool:  Represents activities that are increasingly more difficult (i.e. Bed mobility, Transfers, Gait). Score 24 23 22-20 19-15 14-10 9-7 6   Modifier CH CI CJ CK CL CM CN         Physical Therapy Evaluation Charge Determination   History Examination Presentation Decision-Making   HIGH Complexity :3+ comorbidities / personal factors will impact the outcome/ POC  HIGH Complexity : 4+ Standardized tests and measures addressing body structure, function, activity limitation and / or participation in recreation  LOW Complexity : Stable, uncomplicated  Other outcome measures ampac 6  mod      Based on the above components, the patient evaluation is determined to be of the following complexity level: LOW     Pain Ratin-3/10 generalized     Activity Tolerance:   Good, requires rest breaks and observed SOB with activity    After treatment patient left in no apparent distress:   Sitting in chair, Call bell within reach, Bed / chair alarm activated and Side rails x 3 and nsg updated. GOALS:    Problem: Mobility Impaired (Adult and Pediatric)  Goal: *Acute Goals and Plan of Care (Insert Text)  Description: Pt will be I with LE HEP in 7 days. Pt will perform bed mobility with mod I in 7 days. Pt will perform transfers with mod I in 7 days. Pt will amb  feet with LRAD safely with mod I in 7 days. Outcome: Not Met       COMMUNICATION/EDUCATION:   The patients plan of care was discussed with: Occupational therapist and Registered nurse. Fall prevention education was provided and the patient/caregiver indicated understanding., Patient/family have participated as able in goal setting and plan of care. , and Patient/family agree to work toward stated goals and plan of care.     PT/OT sessions occurred together for increased safety of pt and clinician. Cam Burns, SPT, present during session to assist and observe with patient verbal consent under direct supervision of Katie Dickson, PT, DPT.        Thank you for this referral.  Fior Whitten, PT, DPT   Time Calculation: 33 mins

## 2021-06-08 NOTE — PROGRESS NOTES
Hospitalist Progress Note    Subjective:   Daily Progress Note: 6/8/2021 3:48 PM    Hospital Course:  Pt admitted for complaints of dysphagia and dysarthria. Pt unable to swallow food or liquids for last several days, has lost significant weight. Presents with dysarthria, thickened speech and dysphagia. 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 Pt underwent an EGD , showing hiatal hernia at GE junction. Pt scheduled for barium swallow per speech recommendations. Subjective: Pt seen in room, c/o of garbled speech. No chest pain or dyspnea.     Current Facility-Administered Medications   Medication Dose Route Frequency    nystatin (MYCOSTATIN) 100,000 unit/mL oral suspension 500,000 Units  500,000 Units Oral QID    dextrose 5 % - 0.45% NaCl infusion  75 mL/hr IntraVENous CONTINUOUS    oxymetazoline (AFRIN) 0.05 % nasal spray 1 Spray  1 Spray Both Nostrils ONCE    lidocaine (XYLOCAINE) 4 % (40 mg/mL) topical solution   Topical PRN    [Held by provider] amLODIPine (NORVASC) tablet 5 mg  5 mg Oral DAILY    aspirin tablet 325 mg  325 mg Oral DAILY    [Held by provider] cholecalciferol (VITAMIN D3) (1000 Units /25 mcg) tablet 1,000 Units  1,000 Units Oral DAILY    hydrOXYzine pamoate (VISTARIL) capsule 25 mg  25 mg Oral TID PRN    [Held by provider] losartan (COZAAR) tablet 50 mg  50 mg Oral DAILY    [Held by provider] traZODone (DESYREL) tablet 100 mg  100 mg Oral QHS    hydrALAZINE (APRESOLINE) 20 mg/mL injection 10 mg  10 mg IntraVENous Q6H PRN    sodium chloride (NS) flush 5-40 mL  5-40 mL IntraVENous Q8H    sodium chloride (NS) flush 5-40 mL  5-40 mL IntraVENous PRN    acetaminophen (TYLENOL) tablet 650 mg  650 mg Oral Q6H PRN    Or    acetaminophen (TYLENOL) suppository 650 mg  650 mg Rectal Q6H PRN    polyethylene glycol (MIRALAX) packet 17 g  17 g Oral DAILY PRN    ondansetron (ZOFRAN ODT) tablet 4 mg  4 mg Oral Q8H PRN    Or    ondansetron (ZOFRAN) injection 4 mg  4 mg IntraVENous Q6H PRN    enoxaparin (LOVENOX) injection 30 mg  30 mg SubCUTAneous DAILY        Review of Systems:    Review of Systems   Constitutional: Positive for weight loss. Negative for chills and fever. HENT: Negative for congestion and sore throat. Respiratory: Negative for cough and shortness of breath. Cardiovascular: Positive for palpitations. Negative for chest pain. Gastrointestinal: Positive for constipation. Genitourinary: Negative for dysuria and urgency. Musculoskeletal: Positive for falls. Objective:     Visit Vitals  BP (!) 132/58   Pulse 89   Temp 97.8 °F (36.6 °C)   Resp 24   Ht 5' (1.524 m)   Wt 44.5 kg (98 lb 1.7 oz)   SpO2 98%   BMI 19.16 kg/m²      O2 Device: None (Room air)    Temp (24hrs), Av.1 °F (36.7 °C), Min:97.6 °F (36.4 °C), Max:98.4 °F (36.9 °C)       07 -  1900  In: 50 [I.V.:50]  Out: -   No intake/output data recorded. PHYSICAL EXAM:    Physical Exam  Constitutional:       General: She is not in acute distress. HENT:      Head: Normocephalic. Comments: Left forehead with swelling, left eye bruising     Mouth/Throat:      Comments: Oral candidasis  Eyes:      Conjunctiva/sclera: Conjunctivae normal.      Pupils: Pupils are equal, round, and reactive to light. Cardiovascular:      Rate and Rhythm: Normal rate and regular rhythm. Pulses: Normal pulses. Heart sounds: Normal heart sounds. Pulmonary:      Effort: Pulmonary effort is normal.      Breath sounds: Normal breath sounds. Abdominal:      General: Bowel sounds are normal.   Musculoskeletal:         General: Normal range of motion. Skin:     General: Skin is warm and dry. Neurological:      Mental Status: She is alert and oriented to person, place, and time.    Psychiatric:         Mood and Affect: Mood normal.         Behavior: Behavior normal.            Data Review    Recent Results (from the past 24 hour(s))   METABOLIC PANEL, BASIC    Collection Time: 06/08/21  3:48 AM   Result Value Ref Range    Sodium 139 136 - 145 mmol/L    Potassium 3.9 3.5 - 5.1 mmol/L    Chloride 109 (H) 97 - 108 mmol/L    CO2 23 21 - 32 mmol/L    Anion gap 7 5 - 15 mmol/L    Glucose 74 65 - 100 mg/dL    BUN 26 (H) 6 - 20 mg/dL    Creatinine 0.80 0.55 - 1.02 mg/dL    BUN/Creatinine ratio 33 (H) 12 - 20      GFR est AA >60 >60 ml/min/1.73m2    GFR est non-AA >60 >60 ml/min/1.73m2    Calcium 9.0 8.5 - 10.1 mg/dL   MAGNESIUM    Collection Time: 06/08/21  3:48 AM   Result Value Ref Range    Magnesium 2.1 1.6 - 2.4 mg/dL   CBC WITH AUTOMATED DIFF    Collection Time: 06/08/21  3:48 AM   Result Value Ref Range    WBC 11.6 (H) 3.6 - 11.0 K/uL    RBC 3.88 3.80 - 5.20 M/uL    HGB 11.4 (L) 11.5 - 16.0 g/dL    HCT 36.1 35.0 - 47.0 %    MCV 93.0 80.0 - 99.0 FL    MCH 29.4 26.0 - 34.0 PG    MCHC 31.6 30.0 - 36.5 g/dL    RDW 13.4 11.5 - 14.5 %    PLATELET 640 549 - 902 K/uL    MPV 10.5 8.9 - 12.9 FL    NRBC 0.0 0.0  WBC    ABSOLUTE NRBC 0.00 0.00 - 0.01 K/uL    NEUTROPHILS 69 32 - 75 %    LYMPHOCYTES 20 12 - 49 %    MONOCYTES 11 5 - 13 %    EOSINOPHILS 0 0 - 7 %    BASOPHILS 0 0 - 1 %    IMMATURE GRANULOCYTES 0 0 - 0.5 %    ABS. NEUTROPHILS 8.0 1.8 - 8.0 K/UL    ABS. LYMPHOCYTES 2.3 0.8 - 3.5 K/UL    ABS. MONOCYTES 1.2 (H) 0.0 - 1.0 K/UL    ABS. EOSINOPHILS 0.0 0.0 - 0.4 K/UL    ABS. BASOPHILS 0.1 0.0 - 0.1 K/UL    ABS. IMM. GRANS. 0.1 (H) 0.00 - 0.04 K/UL    DF AUTOMATED         MRI BRAIN WO CONT   Final Result   1. No acute finding. 2. Mild changes of suspected small vessel ischemic disease with diffuse cerebral   atrophy. 3. 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 CT HEAD WO CONT   Final Result   1. No acute intracranial findings. 2. Atrophy and small vessel ischemic disease.       XR SWALLOW FUNC VIDEO    (Results Pending)       Active Problems:    Dysphagia (6/7/2021)        Assessment/Plan: 1. Dysarthria/dysphagia- ? 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 Will consult neurology, ENT. Barium swallow tomorrow, keep NPO.    2. Hypertension- prn hydralazine w/parameters    3. Hx of falls- OT/PT, OOB with assistance. 4. Code status- [facility] will bring in copy for chart.          DVT Prophylaxis: lovenox  Code Status:  DNR  POA: Crystal Favret     ELXV GRRR discussed with:   ___CM, staff nurse, patient, pt's [facility] ____________________________________________________________    Worthy LIZZIE Redding

## 2021-06-08 NOTE — PROGRESS NOTES
SPEECH LANGUAGE PATHOLOGY BEDSIDE SWALLOW EVALUATIONS  Patient: Lizandro Cervantes  (54 y.o. )  Date: 6/8/2021  Primary Diagnosis: dysphagia   Precautions:  Aspiration     ASSESSMENT :  Patient is alert, oriented x4, and follows basic commands. R lingual deviation w/ limited ROM most notable lingual elevation, absent gag and moderate dysarthria. She presents w/ moderate to severe oropharyngeal dysphagia. Oral phase c/b mild discoordination, reduce bolus formation and manipulation, anterior and lateral spillage and reduced A-P. Pharyngeal phase c/b mild swallow delay and HLE is reduced upon palpation. Multiple audible swallows noted w/ reported pharyngeal globus sensation. Overt s/s of pen/asp c/b weak wet cough w/ all PO trials. Discussed concerns w/ NP, rec neuro and ENT consult. Patient will benefit from skilled intervention to address the above impairments. Patients rehabilitation potential is considered to be Guarded     PLAN :  Recommendations and Planned Interventions:  Rec NPO w/ alternative means of nutrition as medically indicated. Plan for MBS on 6/9/21. Frequency/Duration: Patient will be followed by speech-language pathology 5 times a week to address goals. Discharge Recommendations: To Be Determined     SUBJECTIVE:   Patient reports worsening dysphagia and weight loss unable to tolerate any PO intake. Dysarthria noted patient reports new onset. OBJECTIVE:   Patient admitted to ED w/ worsening dysphagia initially poor tolerance to solids now unable to tolerate liquids. EGD revealed hiatal hernia at GE junction. Past Medical History:   Diagnosis Date    Arthritis     GERD (gastroesophageal reflux disease)     Hypertension     Other ill-defined conditions(799.89)     Diverticulitis    Psychiatric disorder     depression     MRI BRAIN WO CONT   Final Result   1. No acute finding. 2. Mild changes of suspected small vessel ischemic disease with diffuse cerebral   atrophy.    3. 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 CT HEAD WO CONT   Final Result   1. No acute intracranial findings. 2. Atrophy and small vessel ischemic disease. Diet prior to admission: Regular w/ poor PO tolerance. Current Diet:  DIET NPO     Cognitive and Communication Status:  Neurologic State: Alert  Orientation Level: Oriented X4  Cognition: Appropriate for age attention/concentration, Follows commands  Perception: Appears intact  Perseveration: No perseveration noted  Safety/Judgement: Awareness of environment  Swallowing Evaluation:   Oral Assessment:  Oral Assessment  Labial: Decreased rate;Decreased seal;Right droop  Dentition: Limited  Oral Hygiene: dry red mucosa  Lingual: Decreased rate;Decreased strength;Right deviation  Velum: Unable to visualize  Mandible: Restricted  P.O. Trials:  Patient Position: upright in bed  Vocal quality prior to P.O.: Low volume  Consistency Presented: Honey thick liquid; Ice chips; Nectar thick liquid; Thin liquid  How Presented: SLP-fed/presented;Spoon     Bolus Acceptance: Impaired  Bolus Formation/Control: Impaired  Type of Impairment: Delayed;Lip closure;Spillage  Propulsion: Delayed (# of seconds); Discoordination  Oral Residue: Less than 10% of bolus  Initiation of Swallow: Delayed (# of seconds)  Laryngeal Elevation: Decreased  Aspiration Signs/Symptoms: Weak cough  Pharyngeal Phase Characteristics: Audible swallow;Double swallow;Effortful swallow; Feeling of discomfort             Oral Phase Severity: Mild-moderate  Pharyngeal Phase Severity : Mild-moderate  Voice:                 Vocal Quality: Low volume             Pain:  Pain Scale 1: Numeric (0 - 10)  Pain Intensity 1: 0         After treatment:   Patient left in no apparent distress in bed, Call bell within reach, and Nursing notified    COMMUNICATION/EDUCATION:   Patient was educated regarding aspiration precautions, s/s aspiration, and ST POC. She demonstrated Good understanding as evidenced by engagement and appropriate questions. The patient's plan of care including recommendations, planned interventions, and recommended diet changes were discussed with: Registered nurse and NP . Patient/family agree to work toward stated goals and plan of care. Problem: Dysphagia (Adult)  Goal: *Acute Goals and Plan of Care (Insert Text)  Description: Speech Therapy Swallow Goals  Initiated 6/8/2021    -Patient will tolerate PO trials without clinical indicators of aspiration given minimal cues within 7 day(s). -Patient will participate in modified barium swallow study within 7 day(s). -Patient will demonstrate understanding of swallow safety precautions and aspiration precautions, diet recs with minimal cues within 7 day(s).         Outcome: Initial     Thank you for this referral.  Keisha Silveira M.S., M.Ed., CCC-SLP  Time Calculation: 15 mins

## 2021-06-08 NOTE — H&P
GENERAL GENERIC H&P/CONSULT  Presenting complaint: Dysphagia    Subjective: 42-year-old female with past medical history multiple comorbidities presents to [facility] with dysphagia. Of note history obtained from patient and patient's daughter who was present at bedside, of note this is patient's sixth ER visit for complaints of dysphagia. As per patient and patient's daughter over the past few weeks patient's been having gradual onset persistent progressive dysphagia to solids which is now progressed to liquids, patient is unable to tolerate p.o. following which patient presented to the ED. This associated with generalized weakness. Patient denies any fevers chills nausea vomiting lightheadedness dizziness dyspnea orthopnea paroxysmal nocturnal dyspnea chest pain palpitations headache focal weakness loss of sensation auditory or visual symptoms abdominal stool or urinary complaints or any other associated symptoms. Patient endorses no recent sick contacts or travel activity    Past Medical History:   Diagnosis Date    Arthritis     GERD (gastroesophageal reflux disease)     Hypertension     Other ill-defined conditions(799.89)     Diverticulitis    Psychiatric disorder     depression      Past Surgical History:   Procedure Laterality Date    HX GI  2010    Colon resection    HX GYN  1973    Hysterectomy    HX HEENT  2013    Laser eye surgery on left    HX ORTHOPAEDIC  1995    Left rotator cuff surgery    HX ORTHOPAEDIC      right 2nd finger      Prior to Admission medications    Medication Sig Start Date End Date Taking? Authorizing Provider   methylPREDNISolone (Medrol, Rey,) 4 mg tablet As directed 6/6/21  Yes Carmen RICHARD MD   hydrOXYzine pamoate (VistariL) 25 mg capsule Take 1 Capsule by mouth three (3) times daily as needed for Anxiety for up to 14 days.  6/6/21 6/20/21 Yes Lisette Loomis MD   nitrofurantoin, macrocrystal-monohydrate, (Macrobid) 100 mg capsule Take 1 Capsule by mouth two (2) times a day for 7 days. 6/2/21 6/9/21 Yes Ethan Rader MD   losartan (COZAAR) 25 mg tablet Take 50 mg by mouth daily. Yes Arcadio Falcon MD   amLODIPine (NORVASC) 5 mg tablet Take 5 mg by mouth daily. Yes Provider, Historical   aspirin (aspirin) 325 mg tablet Take 325 mg by mouth daily. Yes Provider, Historical   traZODone (DESYREL) 100 mg tablet Take 100 mg by mouth nightly. Yes Provider, Historical   cholecalciferol, vitamin D3, (VITAMIN D3) 2,000 unit Tab Take 1 Tab by mouth daily. Yes Provider, Historical     Allergies   Allergen Reactions    Penicillin G Anaphylaxis    Codeine Other (comments)     I can't walk and don't have control over my body      Social History     Tobacco Use    Smoking status: Former Smoker    Smokeless tobacco: Former User   Substance Use Topics    Alcohol use: No      Family History   Problem Relation Age of Onset    Lung Disease Mother     Diabetes Sister     Heart Disease Sister       Review of Systems   Constitutional: Positive for activity change, appetite change and fatigue. Negative for chills, diaphoresis, fever and unexpected weight change. HENT: Positive for trouble swallowing. Negative for congestion, dental problem, drooling, ear discharge, ear pain, facial swelling, hearing loss, mouth sores, nosebleeds, postnasal drip, rhinorrhea, sinus pressure, sinus pain, sneezing, sore throat, tinnitus and voice change. Eyes: Negative for photophobia, pain, discharge, redness, itching and visual disturbance. Respiratory: Negative for apnea, cough, choking, chest tightness, shortness of breath, wheezing and stridor. Cardiovascular: Negative for chest pain, palpitations and leg swelling. Gastrointestinal: Positive for nausea and vomiting. Negative for abdominal distention, abdominal pain, anal bleeding, blood in stool, constipation, diarrhea and rectal pain.    Endocrine: Negative for cold intolerance, heat intolerance, polydipsia, polyphagia and polyuria. Genitourinary: Negative for decreased urine volume, difficulty urinating, dyspareunia, dysuria, enuresis, flank pain, frequency, genital sores, hematuria, menstrual problem, pelvic pain, urgency, vaginal bleeding, vaginal discharge and vaginal pain. Musculoskeletal: Negative for arthralgias, back pain, gait problem, joint swelling, myalgias, neck pain and neck stiffness. Skin: Negative for color change, pallor, rash and wound. Allergic/Immunologic: Negative for environmental allergies, food allergies and immunocompromised state. Neurological: Positive for weakness. Negative for dizziness, tremors, seizures, syncope, facial asymmetry, speech difficulty, light-headedness, numbness and headaches. Hematological: Negative for adenopathy. Does not bruise/bleed easily. Psychiatric/Behavioral: Negative for agitation, behavioral problems, confusion, decreased concentration, dysphoric mood, hallucinations, self-injury, sleep disturbance and suicidal ideas. The patient is not nervous/anxious and is not hyperactive. Objective:    No intake/output data recorded. No intake/output data recorded. No data found. Physical Exam  Vitals reviewed. Constitutional:       General: She is not in acute distress. Appearance: She is normal weight. She is ill-appearing. She is not toxic-appearing or diaphoretic. Comments: Pt appears cachectic   HENT:      Head: Normocephalic and atraumatic. Nose: Nose normal. No congestion or rhinorrhea. Mouth/Throat:      Mouth: Mucous membranes are dry. Pharynx: Oropharynx is clear. No oropharyngeal exudate or posterior oropharyngeal erythema. Eyes:      General: No scleral icterus. Right eye: No discharge. Left eye: No discharge. Extraocular Movements: Extraocular movements intact. Conjunctiva/sclera: Conjunctivae normal.      Pupils: Pupils are equal, round, and reactive to light.    Neck: Vascular: No carotid bruit. Cardiovascular:      Rate and Rhythm: Normal rate and regular rhythm. Pulses: Normal pulses. Heart sounds: Normal heart sounds. No murmur heard. No friction rub. No gallop. Pulmonary:      Effort: Pulmonary effort is normal. No respiratory distress. Breath sounds: Normal breath sounds. No stridor. No wheezing, rhonchi or rales. Chest:      Chest wall: No tenderness. Abdominal:      General: Abdomen is flat. Bowel sounds are normal. There is no distension. Palpations: Abdomen is soft. There is no mass. Tenderness: There is no abdominal tenderness. There is no right CVA tenderness, left CVA tenderness, guarding or rebound. Hernia: No hernia is present. Musculoskeletal:         General: No swelling, tenderness, deformity or signs of injury. Normal range of motion. Cervical back: Normal range of motion and neck supple. No rigidity or tenderness. Right lower leg: No edema. Left lower leg: No edema. Lymphadenopathy:      Cervical: No cervical adenopathy. Skin:     General: Skin is warm. Capillary Refill: Capillary refill takes less than 2 seconds. Coloration: Skin is not jaundiced or pale. Findings: No bruising, erythema, lesion or rash. Neurological:      General: No focal deficit present. Mental Status: She is alert and oriented to person, place, and time. Mental status is at baseline. Cranial Nerves: No cranial nerve deficit. Sensory: No sensory deficit. Motor: No weakness. Coordination: Coordination normal.      Gait: Gait normal.      Deep Tendon Reflexes: Reflexes normal.   Psychiatric:         Mood and Affect: Mood normal.         Behavior: Behavior normal.         Thought Content:  Thought content normal.         Judgment: Judgment normal.          Labs:    Recent Results (from the past 24 hour(s))   CBC WITH AUTOMATED DIFF    Collection Time: 06/07/21  2:45 PM   Result Value Ref Range WBC 12.9 (H) 3.6 - 11.0 K/uL    RBC 4.35 3.80 - 5.20 M/uL    HGB 13.0 11.5 - 16.0 g/dL    HCT 38.7 35.0 - 47.0 %    MCV 89.0 80.0 - 99.0 FL    MCH 29.9 26.0 - 34.0 PG    MCHC 33.6 30.0 - 36.5 g/dL    RDW 13.2 11.5 - 14.5 %    PLATELET 352 (H) 785 - 400 K/uL    MPV 10.0 8.9 - 12.9 FL    NRBC 0.0 0.0  WBC    ABSOLUTE NRBC 0.00 0.00 - 0.01 K/uL    NEUTROPHILS 79 (H) 32 - 75 %    LYMPHOCYTES 12 12 - 49 %    MONOCYTES 9 5 - 13 %    EOSINOPHILS 0 0 - 7 %    BASOPHILS 0 0 - 1 %    IMMATURE GRANULOCYTES 0 0 - 0.5 %    ABS. NEUTROPHILS 10.1 (H) 1.8 - 8.0 K/UL    ABS. LYMPHOCYTES 1.5 0.8 - 3.5 K/UL    ABS. MONOCYTES 1.2 (H) 0.0 - 1.0 K/UL    ABS. EOSINOPHILS 0.0 0.0 - 0.4 K/UL    ABS. BASOPHILS 0.0 0.0 - 0.1 K/UL    ABS. IMM. GRANS. 0.1 (H) 0.00 - 0.04 K/UL    DF AUTOMATED     METABOLIC PANEL, COMPREHENSIVE    Collection Time: 06/07/21  2:45 PM   Result Value Ref Range    Sodium 136 136 - 145 mmol/L    Potassium 3.9 3.5 - 5.1 mmol/L    Chloride 100 97 - 108 mmol/L    CO2 27 21 - 32 mmol/L    Anion gap 9 5 - 15 mmol/L    Glucose 96 65 - 100 mg/dL    BUN 26 (H) 6 - 20 mg/dL    Creatinine 1.06 (H) 0.55 - 1.02 mg/dL    BUN/Creatinine ratio 25 (H) 12 - 20      GFR est AA 60 (L) >60 ml/min/1.73m2    GFR est non-AA 49 (L) >60 ml/min/1.73m2    Calcium 10.2 (H) 8.5 - 10.1 mg/dL    Bilirubin, total 0.7 0.2 - 1.0 mg/dL    AST (SGOT) 13 (L) 15 - 37 U/L    ALT (SGPT) 16 12 - 78 U/L    Alk.  phosphatase 96 45 - 117 U/L    Protein, total 8.4 (H) 6.4 - 8.2 g/dL    Albumin 4.0 3.5 - 5.0 g/dL    Globulin 4.4 (H) 2.0 - 4.0 g/dL    A-G Ratio 0.9 (L) 1.1 - 2.2     TROPONIN I    Collection Time: 06/07/21  2:45 PM   Result Value Ref Range    Troponin-I, Qt. <0.05 <0.05 ng/mL   MAGNESIUM    Collection Time: 06/07/21  2:45 PM   Result Value Ref Range    Magnesium 2.2 1.6 - 2.4 mg/dL   URINALYSIS W/ REFLEX CULTURE    Collection Time: 06/07/21  2:50 PM    Specimen: Urine   Result Value Ref Range    Color Yellow/Straw      Appearance Turbid (A) Clear      Specific gravity <1.005 1.003 - 1.030    pH (UA) 5.0 5.0 - 8.0      Protein Negative Negative mg/dL    Glucose Negative Negative mg/dL    Ketone Negative Negative mg/dL    Bilirubin Negative Negative      Blood Negative Negative      Urobilinogen 0.1 0.1 - 1.0 EU/dL    Nitrites Negative Negative      Leukocyte Esterase Negative Negative      UA:UC IF INDICATED Culture not indicated by UA result Culture not indicated by UA result      WBC 0-4 0 - 4 /hpf    RBC 0-5 0 - 5 /hpf    Bacteria Negative Negative /hpf   PROTHROMBIN TIME + INR    Collection Time: 06/07/21  2:55 PM   Result Value Ref Range    Prothrombin time 13.9 11.9 - 14.7 sec    INR 1.1 0.9 - 1.1         ECG: normal EKG, normal sinus rhythm   CT head without contrast:   IMPRESSION  1. No acute intracranial findings. 2. Atrophy and small vessel ischemic disease. MRI brain without contrast:IMPRESSION  1. No acute finding. 2. Mild changes of suspected small vessel ischemic disease with diffuse cerebral  atrophy. 3. 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 CT neck :IMPRESSION  No demonstrated abnormality. Assessment:  Active Problems:    Dysphagia (6/7/2021)        Plan:    Dysphagiapatient presents with above-mentioned symptomatology with significant dysphagia due to unclear etiology, of note patient unable to tolerate p.o. solids or liquids of note patient CT head/CT soft tissue of neck/MRI brain is negative, unclear as to etiology  N.p.o.  Maintenance IV fluids  Obtain speech and swallow evaluation  Obtain gastroenterology consult    Hypertensioncurrently holding home antihypertensive medications in the setting of patient being n.p.o.   Hydralazine as needed for systolic blood pressure of over 160    Vitamin D deficiencycontinue home medications    Dehydrationstart maintenance IV fluids at this time  Continue to assess volume status    ProphylaxisLovenox  FENn.p.o., maintenance IV fluids, replete potassium and magnesium  Full code, patient surrogate decision-maker is her daughter, admitted for observation and further management      Signed:   Silas Gowers, MD 6/7/2021

## 2021-06-08 NOTE — PROGRESS NOTES
Problem: Falls - Risk of  Goal: *Absence of Falls  Description: Document Burrell Canavan Fall Risk and appropriate interventions in the flowsheet.   Outcome: Progressing Towards Goal  Note: Fall Risk Interventions:                 Elimination Interventions: Bed/chair exit alarm, Call light in reach    History of Falls Interventions: Bed/chair exit alarm         Problem: Patient Education: Go to Patient Education Activity  Goal: Patient/Family Education  Outcome: Progressing Towards Goal     Problem: Discharge Planning  Goal: *Discharge to safe environment  Outcome: Progressing Towards Goal  Goal: *Knowledge of medication management  Outcome: Progressing Towards Goal  Goal: *Knowledge of discharge instructions  Outcome: Progressing Towards Goal     Problem: Patient Education: Go to Patient Education Activity  Goal: Patient/Family Education  Outcome: Progressing Towards Goal     Problem: Patient Education: Go to Patient Education Activity  Goal: Patient/Family Education  Outcome: Progressing Towards Goal     Problem: Patient Education: Go to Patient Education Activity  Goal: Patient/Family Education  Outcome: Progressing Towards Goal

## 2021-06-08 NOTE — ANESTHESIA PREPROCEDURE EVALUATION
Relevant Problems   CARDIOVASCULAR   (+) Benign essential hypertension       Anesthetic History   No history of anesthetic complications            Review of Systems / Medical History  Patient summary reviewed, nursing notes reviewed and pertinent labs reviewed    Pulmonary  Within defined limits                 Neuro/Psych         Psychiatric history     Cardiovascular    Hypertension                Comments: Diagnosis   Final  Normal sinus rhythm   Normal ECG   No previous ECGs available   Confirmed by Lisa TAPIA MD ([id number]) on 6/8/2021 8:49:54 AM        GI/Hepatic/Renal     GERD          Comments: Dysphagia Endo/Other        Arthritis     Other Findings   Comments: Allergies  Penicillin G, Codeine  Ht: 5' (152.4 cm)  Weight: 45.4 kg (100 lb)  BMI: 19.53 kg/m²  No watch meds found  CrCl:   36.8 mL/min  Procedure  ESOPHAGOGASTRODUODENOSCOPY (EGD) (N/A )  Scheduled, [facility] ENDO 02      Medical History  Hypertension  Arthritis  Psychiatric disorder  Other ill-defined conditions(799.89)  GERD (gastroesophageal reflux disease)           Physical Exam    Airway  Mallampati: II  TM Distance: 4 - 6 cm  Neck ROM: normal range of motion   Mouth opening: Normal     Cardiovascular    Rhythm: regular  Rate: normal         Dental  No notable dental hx       Pulmonary  Breath sounds clear to auscultation               Abdominal  GI exam deferred       Other Findings   Comments: Results for Mao Da Silva (MRN [id number]) as of 6/8/2021 10:18    6/8/2021 03:48  WBC: 11.6 (H)  NRBC: 0.0  RBC: 3.88  HGB: 11.4 (L)  HCT: 36.1  MCV: 93.0  MCH: 29.4  MCHC: 31.6  RDW: 13.4  PLATELET: 331  MPV: 74.0  NEUTROPHILS: 69  LYMPHOCYTES: 20  MONOCYTES: 11  EOSINOPHILS: 0  BASOPHILS: 0  IMMATURE GRANULOCYTES: 0  DF: AUTOMATED  ABSOLUTE NRBC: 0.00  ABS. NEUTROPHILS: 8.0  ABS. IMM. GRANS.: 0.1 (H)  ABS. LYMPHOCYTES: 2.3  ABS. MONOCYTES: 1.2 (H)  ABS. EOSINOPHILS: 0.0  ABS.  BASOPHILS: 0.1    Results for Mao Da Silva (MRN [id number]) as of 6/8/2021 10:18    6/8/2021 03:48  Sodium: 139  Potassium: 3.9  Chloride: 109 (H)  CO2: 23  Anion gap: 7  Glucose: 74  BUN: 26 (H)  Creatinine: 0.80  BUN/Creatinine ratio: 33 (H)  Calcium: 9.0  Magnesium: 2.1  GFR est non-AA: >60  GFR est AA: >60           Anesthetic Plan    ASA: 3  Anesthesia type: total IV anesthesia and general          Induction: Intravenous  Anesthetic plan and risks discussed with: Patient and Family      General anesthesia was prescribed for this patient because by definition it is \"a drug-induced loss of consciousness during which patients are not arousable, even by painful stimulation. \" Sometimes, the ability to independently maintain ventilatory function is often impaired and patients often require assistance in maintaining a patent airway. Occasionally, positive pressure ventilation may be required because of depressed spontaneous ventilation or drug-induced depression of neuromuscular function. This depth of anesthesia is preferred for endoscopic/esophageal procedures to facilitate the procedure and for patient safety/quality of care.

## 2021-06-08 NOTE — ANESTHESIA POSTPROCEDURE EVALUATION
Procedure(s):  ESOPHAGOGASTRODUODENOSCOPY (EGD). total IV anesthesia, general    Anesthesia Post Evaluation      Multimodal analgesia: multimodal analgesia not used between 6 hours prior to anesthesia start to PACU discharge  Patient location during evaluation: bedside  Patient participation: complete - patient participated  Level of consciousness: responsive to verbal stimuli  Pain score: 0  Pain management: adequate  Airway patency: patent  Anesthetic complications: no  Cardiovascular status: acceptable, hemodynamically stable and stable  Respiratory status: acceptable, nasal cannula, spontaneous ventilation, unassisted and nonlabored ventilation  Hydration status: acceptable  Post anesthesia nausea and vomiting:  none  Final Post Anesthesia Temperature Assessment:  Normothermia (36.0-37.5 degrees C)      INITIAL Post-op Vital signs: No vitals data found for the desired time range.

## 2021-06-08 NOTE — PROGRESS NOTES
Advance Care Planning     Advance Care Planning (ACP) Physician/NP/PA Conversation      Date of Conversation: 6/7/2021  Conducted with: Patient with Decision Making Capacity and Healthcare Decision Maker: Named in Advance Directive or [address] of [address] [place] [address] Feliciano:     Click here to [address] including selection of the [address] Relationship (ie \"Primary\")  Today we documented Decision Maker(s) consistent with Legal Next of Kin hierarchy. Care Preferences:    Hospitalization: \"If your health worsens and it becomes clear that your chance of recovery is unlikely, what would be your preference regarding hospitalization? \"  The patient would prefer hospitalization. Ventilation: \"If you were unable to breathe on your own and your chance of recovery was unlikely, what would be your preference about the use of a ventilator (breathing machine) if it was available to you? \"   The patient would NOT desire the use of a ventilator. Resuscitation: \"In the event your heart stopped as a result of an underlying serious health condition, would you want attempts to be made to restart your heart, or would you prefer a natural death? \"   No, do NOT attempt to resuscitate.     Additional topics discussed: treatment goals    Conversation Outcomes / Follow-Up Plan:   ACP complete - no further action today  Reviewed DNR/DNI and patient confirms current DNR status - completed forms on file (place new order if needed)     Length of Voluntary ACP Conversation in minutes:  <16 minutes (Non-Billable)    Elissa Delgado NP

## 2021-06-08 NOTE — CONSULTS
Consult    Patient: Alvin Goodwin MRN: [id number]  SSN: [id number]    YOB: 1935  Age: 80 y.o. Sex: female      Subjective:      Alvin Goodwin is a 80 y.o. female who is being seen for dysphagia, history from the patient's chart    She presents to [facility] with dysphagia. Patient was in ER many visits for complaints of dysphagia. patient's been having gradual onset persistent progressive dysphagia to solids which is now progressed to liquids, patient is unable to tolerate p.o.   this associated with generalized weakness.     CT scan of the abdominal show adrenal gland nodule, consistent with metastasis, head CT showed atrophic changes no metastasis or mass    Past Medical History:   Diagnosis Date    Arthritis     GERD (gastroesophageal reflux disease)     Hypertension     Other ill-defined conditions(079.89)     Diverticulitis    Psychiatric disorder     depression     Past Surgical History:   Procedure Laterality Date    HX GI  2010    Colon resection    HX GYN  1973    Hysterectomy    HX HEENT  2013    Laser eye surgery on left    HX ORTHOPAEDIC  1995    Left rotator cuff surgery    HX ORTHOPAEDIC      right 2nd finger      Family History   Problem Relation Age of Onset    Lung Disease Mother     Diabetes Sister     Heart Disease Sister      Social History     Tobacco Use    Smoking status: Former Smoker    Smokeless tobacco: Former User   Substance Use Topics    Alcohol use: No      Current Facility-Administered Medications   Medication Dose Route Frequency Provider Last Rate Last Admin    nystatin (MYCOSTATIN) 100,000 unit/mL oral suspension 500,000 Units  500,000 Units Oral QID Conrad Correa NP        0.9% sodium chloride infusion  50 mL/hr IntraVENous CONTINUOUS Kardar, Ahmed Ardie Kussmaul, MD 50 mL/hr at 06/07/21 2037 50 mL/hr at 06/07/21 2037    amLODIPine (NORVASC) tablet 5 mg  5 mg Oral DAILY MD Tiburcio Chavez aspirin tablet 325 mg  325 mg Oral DAILY Rigoberto Mercado MD        cholecalciferol (VITAMIN D3) (1000 Units /25 mcg) tablet 1,000 Units  1,000 Units Oral DAILY Rigoberto Mercado MD        hydrOXYzine pamoate (VISTARIL) capsule 25 mg  25 mg Oral TID PRN Rigoberto Mercado MD        losartan (COZAAR) tablet 50 mg  50 mg Oral DAILY Jeaneth Wilson MD        [facility] traZODone (DESYREL) tablet 100 mg  100 mg Oral QHS Rigoberto Mercado MD        hydrALAZINE (APRESOLINE) 20 mg/mL injection 10 mg  10 mg IntraVENous Q6H PRN Rigoberto Mercado MD        sodium chloride (NS) flush 5-40 mL  5-40 mL IntraVENous Pat Jeaneth Muro MD   10 mL at 06/08/21 0529    sodium chloride (NS) flush 5-40 mL  5-40 mL IntraVENous PRN Rigoberto Mercado MD        acetaminophen (TYLENOL) tablet 650 mg  650 mg Oral Q6H PRN Rigoberto Mercado MD        Or    acetaminophen (TYLENOL) suppository 650 mg  650 mg Rectal Q6H PRN Rigoberto Mercado MD        polyethylene glycol [facility]) packet 17 g  17 g Oral DAILY PRN Rigoberto Mercado MD        ondansetron (ZOFRAN ODT) tablet 4 mg  4 mg Oral Q8H PRN Rigoberto Mercado MD        Or    ondansetron [facility]) injection 4 mg  4 mg IntraVENous Q6H PRN Rigoberto Mercado MD        enoxaparin (LOVENOX) injection 30 mg  30 mg SubCUTAneous DAILY David Wilson MD            Allergies   Allergen Reactions    Penicillin G Anaphylaxis    Codeine Other (comments)     I can't walk and don't have control over my body       Review of Systems:  Review of Systems   Unable to perform ROS: Medical condition        Objective:     Vitals:    06/07/21 2135 06/08/21 0006 06/08/21 0810 06/08/21 0955   BP: (!) 194/94 (!) 152/71 (!) 150/79    Pulse: 100 94 86    Resp: 18 18 18    Temp: 98 °F (36.7 °C) 98.1 °F (36.7 °C) 98.4 °F (36.9 °C)    SpO2: 96% 97% 97% Weight:       Height:    5' (1.524 m)        Physical Exam:  Physical Exam  Constitutional:       General: She is in acute distress. Appearance: She is ill-appearing. HENT:      Head: Normocephalic. Nose: Nose normal.   Eyes:      General: No scleral icterus. Conjunctiva/sclera: Conjunctivae normal.      Pupils: Pupils are equal, round, and reactive to light. Cardiovascular:      Rate and Rhythm: Normal rate. Pulmonary:      Effort: Pulmonary effort is normal.   Abdominal:      General: Abdomen is flat. Bowel sounds are normal. There is no distension. Tenderness: There is no abdominal tenderness. Hernia: No hernia is present. Genitourinary:     Rectum: Guaiac result negative. Musculoskeletal:         General: Normal range of motion. Cervical back: Normal range of motion. Skin:     General: Skin is warm. Coloration: Skin is not jaundiced. Neurological:      General: No focal deficit present. Sensory: Sensory deficit present. Recent Results (from the past 24 hour(s))   CBC WITH AUTOMATED DIFF    Collection Time: 06/07/21  2:45 PM   Result Value Ref Range    WBC 12.9 (H) 3.6 - 11.0 K/uL    RBC 4.35 3.80 - 5.20 M/uL    HGB 13.0 11.5 - 16.0 g/dL    HCT 38.7 35.0 - 47.0 %    MCV 89.0 80.0 - 99.0 FL    MCH 29.9 26.0 - 34.0 PG    MCHC 33.6 30.0 - 36.5 g/dL    RDW 13.2 11.5 - 14.5 %    PLATELET 979 (H) 278 - 400 K/uL    MPV 10.0 8.9 - 12.9 FL    NRBC 0.0 0.0  WBC    ABSOLUTE NRBC 0.00 0.00 - 0.01 K/uL    NEUTROPHILS 79 (H) 32 - 75 %    LYMPHOCYTES 12 12 - 49 %    MONOCYTES 9 5 - 13 %    EOSINOPHILS 0 0 - 7 %    BASOPHILS 0 0 - 1 %    IMMATURE GRANULOCYTES 0 0 - 0.5 %    ABS. NEUTROPHILS 10.1 (H) 1.8 - 8.0 K/UL    ABS. LYMPHOCYTES 1.5 0.8 - 3.5 K/UL    ABS. MONOCYTES 1.2 (H) 0.0 - 1.0 K/UL    ABS. EOSINOPHILS 0.0 0.0 - 0.4 K/UL    ABS. BASOPHILS 0.0 0.0 - 0.1 K/UL    ABS. IMM.  GRANS. 0.1 (H) 0.00 - 0.04 K/UL    DF AUTOMATED     METABOLIC PANEL, COMPREHENSIVE    Collection Time: 06/07/21  2:45 PM   Result Value Ref Range    Sodium 136 136 - 145 mmol/L    Potassium 3.9 3.5 - 5.1 mmol/L    Chloride 100 97 - 108 mmol/L    CO2 27 21 - 32 mmol/L    Anion gap 9 5 - 15 mmol/L    Glucose 96 65 - 100 mg/dL    BUN 26 (H) 6 - 20 mg/dL    Creatinine 1.06 (H) 0.55 - 1.02 mg/dL    BUN/Creatinine ratio 25 (H) 12 - 20      GFR est AA 60 (L) >60 ml/min/1.73m2    GFR est non-AA 49 (L) >60 ml/min/1.73m2    Calcium 10.2 (H) 8.5 - 10.1 mg/dL    Bilirubin, total 0.7 0.2 - 1.0 mg/dL    AST (SGOT) 13 (L) 15 - 37 U/L    ALT (SGPT) 16 12 - 78 U/L    Alk.  phosphatase 96 45 - 117 U/L    Protein, total 8.4 (H) 6.4 - 8.2 g/dL    Albumin 4.0 3.5 - 5.0 g/dL    Globulin 4.4 (H) 2.0 - 4.0 g/dL    A-G Ratio 0.9 (L) 1.1 - 2.2     TROPONIN I    Collection Time: 06/07/21  2:45 PM   Result Value Ref Range    Troponin-I, Qt. <0.05 <0.05 ng/mL   MAGNESIUM    Collection Time: 06/07/21  2:45 PM   Result Value Ref Range    Magnesium 2.2 1.6 - 2.4 mg/dL   URINALYSIS W/ REFLEX CULTURE    Collection Time: 06/07/21  2:50 PM    Specimen: Urine   Result Value Ref Range    Color Yellow/Straw      Appearance Turbid (A) Clear      Specific gravity <1.005 1.003 - 1.030    pH (UA) 5.0 5.0 - 8.0      Protein Negative Negative mg/dL    Glucose Negative Negative mg/dL    Ketone Negative Negative mg/dL    Bilirubin Negative Negative      Blood Negative Negative      Urobilinogen 0.1 0.1 - 1.0 EU/dL    Nitrites Negative Negative      Leukocyte Esterase Negative Negative      UA:UC IF INDICATED Culture not indicated by UA result Culture not indicated by UA result      WBC 0-4 0 - 4 /hpf    RBC 0-5 0 - 5 /hpf    Bacteria Negative Negative /hpf   PROTHROMBIN TIME + INR    Collection Time: 06/07/21  2:55 PM   Result Value Ref Range    Prothrombin time 13.9 11.9 - 14.7 sec    INR 1.1 0.9 - 1.1     EKG, 12 LEAD, INITIAL    Collection Time: 06/07/21  4:00 PM   Result Value Ref Range    Ventricular Rate 82 BPM    Atrial Rate 82 BPM    P-R Interval 168 ms    QRS Duration 70 ms    Q-T Interval 364 ms    QTC Calculation (Bezet) 425 ms    Calculated P Axis 63 degrees    Calculated R Axis 3 degrees    Calculated T Axis 56 degrees    Diagnosis       Normal sinus rhythm  Normal ECG  No previous ECGs available  Confirmed by Kay TAPIA MD ([id number]) on 6/8/2021 1:65:10 AM     METABOLIC PANEL, BASIC    Collection Time: 06/08/21  3:48 AM   Result Value Ref Range    Sodium 139 136 - 145 mmol/L    Potassium 3.9 3.5 - 5.1 mmol/L    Chloride 109 (H) 97 - 108 mmol/L    CO2 23 21 - 32 mmol/L    Anion gap 7 5 - 15 mmol/L    Glucose 74 65 - 100 mg/dL    BUN 26 (H) 6 - 20 mg/dL    Creatinine 0.80 0.55 - 1.02 mg/dL    BUN/Creatinine ratio 33 (H) 12 - 20      GFR est AA >60 >60 ml/min/1.73m2    GFR est non-AA >60 >60 ml/min/1.73m2    Calcium 9.0 8.5 - 10.1 mg/dL   MAGNESIUM    Collection Time: 06/08/21  3:48 AM   Result Value Ref Range    Magnesium 2.1 1.6 - 2.4 mg/dL   CBC WITH AUTOMATED DIFF    Collection Time: 06/08/21  3:48 AM   Result Value Ref Range    WBC 11.6 (H) 3.6 - 11.0 K/uL    RBC 3.88 3.80 - 5.20 M/uL    HGB 11.4 (L) 11.5 - 16.0 g/dL    HCT 36.1 35.0 - 47.0 %    MCV 93.0 80.0 - 99.0 FL    MCH 29.4 26.0 - 34.0 PG    MCHC 31.6 30.0 - 36.5 g/dL    RDW 13.4 11.5 - 14.5 %    PLATELET 655 680 - 883 K/uL    MPV 10.5 8.9 - 12.9 FL    NRBC 0.0 0.0  WBC    ABSOLUTE NRBC 0.00 0.00 - 0.01 K/uL    NEUTROPHILS 69 32 - 75 %    LYMPHOCYTES 20 12 - 49 %    MONOCYTES 11 5 - 13 %    EOSINOPHILS 0 0 - 7 %    BASOPHILS 0 0 - 1 %    IMMATURE GRANULOCYTES 0 0 - 0.5 %    ABS. NEUTROPHILS 8.0 1.8 - 8.0 K/UL    ABS. LYMPHOCYTES 2.3 0.8 - 3.5 K/UL    ABS. MONOCYTES 1.2 (H) 0.0 - 1.0 K/UL    ABS. EOSINOPHILS 0.0 0.0 - 0.4 K/UL    ABS. BASOPHILS 0.1 0.0 - 0.1 K/UL    ABS. IMM. GRANS. 0.1 (H) 0.00 - 0.04 K/UL    DF AUTOMATED          MRI BRAIN WO CONT   Final Result   1. No acute finding.    2. Mild changes of suspected small vessel ischemic disease with diffuse cerebral   atrophy. 3. 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 CT HEAD WO CONT   Final Result   1. No acute intracranial findings. 2. Atrophy and small vessel ischemic disease.          Assessment:     Hospital Problems  Date Reviewed: 6/8/2021        Codes Class Noted POA    Dysphagia ICD-10-CM: R13.10  ICD-9-CM: 787.20  6/7/2021 Unknown          Dysphagia   , patient unable to tolerate p.o. solids or liquids of note   unclear etiology  patient CT head/CT soft tissue of neck/MRI brain is negative,     General weakness, malnutrition,  Vitamin D deficiencycontinue home medications  Dehydration    Plan:   N.p.o.  Maintenance IV fluids  Obtain speech and swallow evaluation  A EGD study has been scheduled, indication risks will be discussed with patient power of ,  her daughter,  Further recommendation will be  made after the above tests results available  Signed By: Radha Thomas MD     June 8, 2021         Thank you for allowing me to participate in this patients care  Cc Referring Physician   Wayne Mas MD

## 2021-06-08 NOTE — PROGRESS NOTES
Admission skin assessment performed by  and GILLES Mclean RN. Pt has a skin tear to left forearm, a skin tear to left upper arm, a small scab to right knee, yellow bruising noted to left forehead and redness/bruising around left eye. Sacrum is pink, nonblanchable, but intact. Pt instructed to lie on her side and to move side to side. z guard cream applied.

## 2021-06-08 NOTE — PROGRESS NOTES
OCCUPATIONAL THERAPY EVALUATION  Patient: Mor Suarez (89 y.o. female)  Date: 6/8/2021  Primary Diagnosis: Dysphagia [R13.10]        Precautions: fall risk       ASSESSMENT  Patient is a 80year old female, who came to the ED with worsening speech and difficulty swallowing and admitted for dysphagia on 6/7/2021. PMH includes: arthritis, GERD, hypertension, diverticulitis, depression, colon resection and left rotator cuff surgery. Based on the objective data described below, the patient presents with c/o dizziness with mobility, decreased activity tolerance, decreased standing balance, generalized deconditioning and increased need for assist with self care and functional mobility/transfers. Patient semi-supine upon OT arrival and agreeable to working with therapy. Patient A&O x4 and per pt report, pt lives alone in a two story home with 6 ADITYA and MITCH handrails. Patient reports using SPC for ambulation, DME: SPC, tub bench and chair lift. Patient reported needing assistance from neighbor or granddaughter for IADLs but IND for ADLs. Patient SBA for bed mobility, SBA sup -> sit and scooting. Patient SBA sit <> stand and ambulating to and from bathroom with CGA, gait belt and RW. Patient CGA toilet transfer and SBA toileting, SBA grooming standing at sink to brush hair and wash hands. Patient ambulated back into room and CGA chair transfer, left sitting in chair with PT present in room. Patient would benefit from continued skilled OT services to address above deficits and improve safety and independence with self care and functional mobility/transfers. Recommend discharge to home with [address] [place] [address] and 24/7 care vs. SNF when medically appropriate. Patient with recent history of falls at home so would need increased family care for safe discharge. Current Level of Function Impacting Discharge (ADLs/self-care): SBA toileting and grooming.     Other factors to consider for discharge: patient lives alone, two recent falls PLAN :  Recommendations and Planned Interventions: self care training, functional mobility training, therapeutic exercise, balance training, therapeutic activities, endurance activities, patient education, home safety training and family training/education    Frequency/Duration: Patient will be followed by occupational therapy 3 times a week to address goals. Recommendation for discharge: (in order for the patient to meet his/her long term goals)  Home with [address] [place] [address] and 24/7 care vs. SNF    This discharge recommendation:  Has been made in collaboration with the attending provider and/or case management    IF patient discharges home will need the following DME: cane: quad       SUBJECTIVE:   Patient stated I've had two recent falls.     OBJECTIVE DATA SUMMARY:   HISTORY:   Past Medical History:   Diagnosis Date    Arthritis     GERD (gastroesophageal reflux disease)     Hypertension     Other ill-defined conditions(799.89)     Diverticulitis    Psychiatric disorder     depression     Past Surgical History:   Procedure Laterality Date    HX GI  2010    Colon resection    HX GYN  1973    Hysterectomy    HX HEENT  2013    Laser eye surgery on left    HX ORTHOPAEDIC  1995    Left rotator cuff surgery    HX ORTHOPAEDIC      right 2nd finger       Expanded or extensive additional review of patient history:     Home Situation  Home Environment: Private residence  # Steps to Enter: 6  Rails to Enter: Yes  Hand Rails : Bilateral  One/Two Story Residence: Two story  Lift Chair Available: Yes  Living Alone: Yes  Support Systems: Family member(s), Friends \ neighbors (granddaughter)  Patient Expects to be Discharged to[de-identified] [employer]  Current DME Used/Available at Home: Tub transfer bench, Cane, straight  Tub or Shower Type: Tub/Shower combination    PLOF: Pt IND for ADLS required assist for IADLS, mod I with mobility prior to admission.      Hand dominance: Right    EXAMINATION OF PERFORMANCE DEFICITS:  Cognitive/Behavioral Status:  Neurologic State: Alert  Orientation Level: Oriented X4  Cognition: Follows commands  Safety/Judgement: Fall prevention    Skin: bandages on L UE and scattered bruising from prior falls    Edema: none noted    Hearing: Auditory  Auditory Impairment: None    Vision/Perceptual:    Not formally assessed, but generally WFL    Range of Motion:  AROM: Within functional limits    Strength:  Strength: Within functional limits    Coordination:  Generally intact    Tone & Sensation:  Tone: Normal    Balance:  Sitting: Intact  Standing: Impaired  Standing - Static: Good  Standing - Dynamic : Fair;Occasional    Functional Mobility and Transfers for ADLs:  Bed Mobility:  Rolling: Stand-by assistance  Supine to Sit: Stand-by assistance  Scooting: Stand-by assistance    Transfers:  Sit to Stand: Stand-by assistance  Stand to Sit: Stand-by assistance  Bed to Chair: Contact guard assistance  Bathroom Mobility: Contact guard assistance  Toilet Transfer : Contact guard assistance  Assistive Device : Gait Belt;Walker, rolling    ADL Assessment:  Oral Facial Hygiene/Grooming: Stand-by assistance     Toileting: Stand by assistance    ADL Intervention and task modifications:  Grooming  Grooming Assistance: Stand-by assistance  Position Performed: Standing  Washing Hands: Stand-by assistance  Brushing/Combing Hair: Stand-by assistance    Toileting  Toileting Assistance: Stand-by assistance  Bladder Hygiene: Stand-by assistance  Clothing Management: Stand-by assistance  Adaptive Equipment: Walker;Grab bars    Cognitive Retraining  Safety/Judgement: Fall prevention    Therapeutic Exercise:  Patient would benefit from UE HEP to maintain AROM and strength. Functional Measure:    [facility] [address] [address] AM-PACTM \"6 Clicks\"                                                       Daily Activity Inpatient Short Form  How much help from another person does the patient currently need. ..  Total; A Lot A Little None   1. Putting on and taking off regular lower body clothing? []  1 []  2 [x]  3 []  4   2. Bathing (including washing, rinsing, drying)? []  1 []  2 [x]  3 []  4   3. Toileting, which includes using toilet, bedpan or urinal? [] 1 []  2 [x]  3 []  4   4. Putting on and taking off regular upper body clothing? []  1 []  2 [x]  3 []  4   5. Taking care of personal grooming such as brushing teeth? []  1 []  2 [x]  3 []  4   6. Eating meals? []  1 []  2 [x]  3 []  4   © , [facility] [address] [place] [address] [address] [address], under license to [employer]. All rights reserved     Score: 1824     Interpretation of Tool:  Represents clinically-significant functional categories (i.e. Activities of daily living). Percentage of Impairment CH    0%   CI    1-19% CJ    20-39% CK    40-59% CL    60-79% CM    80-99% CN     100%   [facility]  Score 6-24 24 23 20-22 15-19 10-14 7-9 6        Occupational Therapy Evaluation Charge Determination   History Examination Decision-Making   LOW Complexity : Brief history review  LOW Complexity : 1-3 performance deficits relating to physical, cognitive , or psychosocial skils that result in activity limitations and / or participation restrictions  LOW Complexity : No comorbidities that affect functional and no verbal or physical assistance needed to complete eval tasks       Based on the above components, the patient evaluation is determined to be of the following complexity level: LOW     Pain Ratin-3/10 generalized    Activity Tolerance:   Fair, requires rest breaks and signs and symptoms of orthostatic hypotension    After treatment patient left in no apparent distress:    Sitting in chair and with PT present in room    COMMUNICATION/EDUCATION:   The patients plan of care was discussed with: Physical therapist and Registered nurse. Home safety education was provided and the patient/caregiver indicated understanding., Patient/family have participated as able in goal setting and plan of care. and Patient/family agree to work toward stated goals and plan of care. This patients plan of care is appropriate for delegation to MARY. OT/PT sessions occurred together for increased patient and clinician safety. Lesia Nunez, OT student, assisting with patient therapy session and documentation, with patient verbal consent, under direct supervision of WENDY Watts/L.     Problem: Self Care Deficits Care Plan (Adult)  Goal: *Acute Goals and Plan of Care (Insert Text)  Description: Pt will be mod I sup <> sit in prep for EOB ADLs  Pt will be mod I grooming sitting EOB  Pt will be mod I LE dressing sitting EOB/long sit  Pt will be mod I sit <>  prep for toileting LRAD  Pt will be mod I toileting/toilet transfer/cloth mgmt LRAD  Pt will be IND following UE HEP in prep for self care tasks   Outcome: Not Met     Thank you for this referral.  WENDY Lamas/L  Time Calculation: 23 mins

## 2021-06-08 NOTE — CONSULTS
Neurology Consult Note    HPI  Ms. Cuate Campbell is a 80 y.o.  female with a history significant for HTN, Osteoarthritis, GERD, Diverticulitis, Anxiety/Depression who presented with difficulty swallowing. Per chart review, patient has had multiple visits to the ED for difficulty with her swallowing. Over the last few weeks prior to presentation patient reportedly has been having worsening difficulty with swallowing solids which has progressed to liquids as well. Patient unable to keep any foods down because of this. Emergent CT head was negative for any acute findings. In the ED, patient was found to have a leukocytosis of 12.9, slightly elevated creatinine of 1.06. Patient reportsshe has no prior history of strokes, seizures or cancer that she is aware of. She ambulates at home using a cane. She reports that it is very difficult to get anything down her mouth because she feels that her tongue is weak and she cannot swallow. 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 Cuate Campbell is a 80 y.o.  female with the above history presented with dysphagia. Patient reportedly has been having worsening dysphagia over the last few weeks resulting in several ED visits. Patient initially had difficulty only with solids but now involves liquids as well. Emergent CT head was negative for any acute findings. Patient was found to have initial slight leukocytosis. Etiology of patient's dysphagia likely related to patient's hernia found by GI team but also possibly related to underlying metabolic derangements. Given patient's difficulty with her tongue movements areas concern for pathology in the cervical medullary region. Will obtain contrasted MRI of the brain for further evaluation. Other etiologies will includs amyloidosis, neuromuscular junctional abnormality or motor neuron disease.   Given patient has lost significant amount of weight recently, underlying neoplastic process cannot be completely ruled out. Will obtain laboratory work-up per below and have patient work with therapies at the current time. Will obtain NIF/VC as well. We will plan to obtain outpatient NCS/EMG for further evaluation. Reviewed patient's MRI of the brain which is reassuring. RECOMMENDATIONS      Dysphagia W/Tongue Weakness  Remote Left ParietoOccipital SAH   Associated: Gastroesophageal Hiatal Hernia  -PT/OT/ST as needed  -Management of metabolic/infectious derangements to referring teams  -GI following  -Plan for outpatient NCS/EMG to evaluate for motor neuron disease  -F/U MRI Brain WWO, TSH, RPR, SPEP, UPEP, Free Light Chains, MG Panel  -F/U NIF/VC      Diagnosis and plan was discussed extensively with patient who is in agreement with the above plan. They have been counseled regarding potential side effects of the interventions above and would like to proceed with the aforementioned plan. Review of Systems  A 14 point review was done and pertinent positives & negative findings are listed as part of the history of present illness, all other systems were reviewed and are negative.     Physical/Neurological Exam  Cardiovascular: tachycardic at times  Pulmonary: no increased work of breathing  Gastrointestinal/Abdomen: soft w/hypoactive bowel sounds   Skin: warm and dry      Patient awake, alert; following central and peripheral commands   No expressive or receptive aphasia; moderate to severe dysarthria  Pupils react to light bilaterally; EOM Intact   No visual field deficits on gross exam   Intact to light touch on face bilaterally   No facial droop   No gross hearing loss   Slow movements of the tongue with slight deviation to the left  Motor: 3+/5 throughout  No abnormal movements   Normal tone throughout   Sensation to light touch intact grossly throughout       Past Medical History:   Diagnosis Date    Arthritis     GERD (gastroesophageal reflux disease)     Hypertension     Other ill-defined conditions(799.89)     Diverticulitis    Psychiatric disorder     depression      Past Surgical History:   Procedure Laterality Date    HX GI  2010    Colon resection    HX GYN  1973    Hysterectomy    HX HEENT  2013    Laser eye surgery on left    HX ORTHOPAEDIC  1995    Left rotator cuff surgery    HX ORTHOPAEDIC      right 2nd finger     Allergies   Allergen Reactions    Penicillin G Anaphylaxis    Codeine Other (comments)     I can't walk and don't have control over my body     Family History   Problem Relation Age of Onset    Lung Disease Mother     Diabetes Sister     Heart Disease Sister         Social Connections:     Frequency of Communication with Friends and Family:     Frequency of Social Gatherings with Friends and Family:     Attends Hinduism Services:     Active Member of Clubs or Organizations:     Attends Club or Organization Meetings:     Marital Status:          Medications  Current Outpatient Medications   Medication Instructions    amLODIPine (NORVASC) 5 mg, Oral, DAILY    aspirin (ASPIRIN) 325 mg, Oral, DAILY    cholecalciferol, vitamin D3, (VITAMIN D3) 2,000 unit Tab 1 Tablet, DAILY    hydrOXYzine pamoate (VISTARIL) 25 mg, Oral, 3 TIMES DAILY AS NEEDED    losartan (COZAAR) 50 mg, Oral, DAILY    methylPREDNISolone (Medrol, Rey,) 4 mg tablet As directed    nitrofurantoin, macrocrystal-monohydrate, (Macrobid) 100 mg capsule 100 mg, Oral, 2 TIMES DAILY    traZODone (DESYREL) 100 mg, EVERY BEDTIME       Current Facility-Administered Medications   Medication Dose Route Frequency    nystatin (MYCOSTATIN) 100,000 unit/mL oral suspension 500,000 Units  500,000 Units Oral QID    0.9% sodium chloride infusion  50 mL/hr IntraVENous CONTINUOUS    amLODIPine (NORVASC) tablet 5 mg  5 mg Oral DAILY    aspirin tablet 325 mg  325 mg Oral DAILY    cholecalciferol (VITAMIN D3) (1000 Units /25 mcg) tablet 1,000 Units  1,000 Units Oral DAILY    hydrOXYzine pamoate (VISTARIL) capsule 25 mg  25 mg Oral TID PRN    losartan (COZAAR) tablet 50 mg  50 mg Oral DAILY    [Held by provider] traZODone (DESYREL) tablet 100 mg  100 mg Oral QHS    hydrALAZINE (APRESOLINE) 20 mg/mL injection 10 mg  10 mg IntraVENous Q6H PRN    sodium chloride (NS) flush 5-40 mL  5-40 mL IntraVENous Q8H    sodium chloride (NS) flush 5-40 mL  5-40 mL IntraVENous PRN    acetaminophen (TYLENOL) tablet 650 mg  650 mg Oral Q6H PRN    Or    acetaminophen (TYLENOL) suppository 650 mg  650 mg Rectal Q6H PRN    polyethylene glycol (MIRALAX) packet 17 g  17 g Oral DAILY PRN    ondansetron (ZOFRAN ODT) tablet 4 mg  4 mg Oral Q8H PRN    Or    ondansetron (ZOFRAN) injection 4 mg  4 mg IntraVENous Q6H PRN    enoxaparin (LOVENOX) injection 30 mg  30 mg SubCUTAneous DAILY        Objective  Temp:  [98 °F (36.7 °C)-98.9 °F (37.2 °C)]   Pulse (Heart Rate):  []   BP: (137-194)/(60-94)   Resp Rate:  [18]   O2 Sat (%):  [96 %-99 %]   Weight:  [45.4 kg (100 lb)]   No intake or output data in the 24 hours ending 06/08/21 1255  Wt Readings from Last 3 Encounters:   06/07/21 45.4 kg (100 lb)   06/06/21 45.4 kg (100 lb)   06/03/21 47.6 kg (105 lb)        Labs  Recent Results (from the past 24 hour(s))   CBC WITH AUTOMATED DIFF    Collection Time: 06/07/21  2:45 PM   Result Value Ref Range    WBC 12.9 (H) 3.6 - 11.0 K/uL    RBC 4.35 3.80 - 5.20 M/uL    HGB 13.0 11.5 - 16.0 g/dL    HCT 38.7 35.0 - 47.0 %    MCV 89.0 80.0 - 99.0 FL    MCH 29.9 26.0 - 34.0 PG    MCHC 33.6 30.0 - 36.5 g/dL    RDW 13.2 11.5 - 14.5 %    PLATELET 489 (H) 093 - 400 K/uL    MPV 10.0 8.9 - 12.9 FL    NRBC 0.0 0.0  WBC    ABSOLUTE NRBC 0.00 0.00 - 0.01 K/uL    NEUTROPHILS 79 (H) 32 - 75 %    LYMPHOCYTES 12 12 - 49 %    MONOCYTES 9 5 - 13 %    EOSINOPHILS 0 0 - 7 %    BASOPHILS 0 0 - 1 %    IMMATURE GRANULOCYTES 0 0 - 0.5 %    ABS.  NEUTROPHILS 10.1 (H) 1.8 - 8.0 K/UL    ABS. LYMPHOCYTES 1.5 0.8 - 3.5 K/UL    ABS. MONOCYTES 1.2 (H) 0.0 - 1.0 K/UL    ABS. EOSINOPHILS 0.0 0.0 - 0.4 K/UL    ABS. BASOPHILS 0.0 0.0 - 0.1 K/UL    ABS. IMM. GRANS. 0.1 (H) 0.00 - 0.04 K/UL    DF AUTOMATED     METABOLIC PANEL, COMPREHENSIVE    Collection Time: 06/07/21  2:45 PM   Result Value Ref Range    Sodium 136 136 - 145 mmol/L    Potassium 3.9 3.5 - 5.1 mmol/L    Chloride 100 97 - 108 mmol/L    CO2 27 21 - 32 mmol/L    Anion gap 9 5 - 15 mmol/L    Glucose 96 65 - 100 mg/dL    BUN 26 (H) 6 - 20 mg/dL    Creatinine 1.06 (H) 0.55 - 1.02 mg/dL    BUN/Creatinine ratio 25 (H) 12 - 20      GFR est AA 60 (L) >60 ml/min/1.73m2    GFR est non-AA 49 (L) >60 ml/min/1.73m2    Calcium 10.2 (H) 8.5 - 10.1 mg/dL    Bilirubin, total 0.7 0.2 - 1.0 mg/dL    AST (SGOT) 13 (L) 15 - 37 U/L    ALT (SGPT) 16 12 - 78 U/L    Alk.  phosphatase 96 45 - 117 U/L    Protein, total 8.4 (H) 6.4 - 8.2 g/dL    Albumin 4.0 3.5 - 5.0 g/dL    Globulin 4.4 (H) 2.0 - 4.0 g/dL    A-G Ratio 0.9 (L) 1.1 - 2.2     TROPONIN I    Collection Time: 06/07/21  2:45 PM   Result Value Ref Range    Troponin-I, Qt. <0.05 <0.05 ng/mL   MAGNESIUM    Collection Time: 06/07/21  2:45 PM   Result Value Ref Range    Magnesium 2.2 1.6 - 2.4 mg/dL   URINALYSIS W/ REFLEX CULTURE    Collection Time: 06/07/21  2:50 PM    Specimen: Urine   Result Value Ref Range    Color Yellow/Straw      Appearance Turbid (A) Clear      Specific gravity <1.005 1.003 - 1.030    pH (UA) 5.0 5.0 - 8.0      Protein Negative Negative mg/dL    Glucose Negative Negative mg/dL    Ketone Negative Negative mg/dL    Bilirubin Negative Negative      Blood Negative Negative      Urobilinogen 0.1 0.1 - 1.0 EU/dL    Nitrites Negative Negative      Leukocyte Esterase Negative Negative      UA:UC IF INDICATED Culture not indicated by UA result Culture not indicated by UA result      WBC 0-4 0 - 4 /hpf    RBC 0-5 0 - 5 /hpf    Bacteria Negative Negative /hpf   PROTHROMBIN TIME + INR Collection Time: 06/07/21  2:55 PM   Result Value Ref Range    Prothrombin time 13.9 11.9 - 14.7 sec    INR 1.1 0.9 - 1.1     EKG, 12 LEAD, INITIAL    Collection Time: 06/07/21  4:00 PM   Result Value Ref Range    Ventricular Rate 82 BPM    Atrial Rate 82 BPM    P-R Interval 168 ms    QRS Duration 70 ms    Q-T Interval 364 ms    QTC Calculation (Bezet) 425 ms    Calculated P Axis 63 degrees    Calculated R Axis 3 degrees    Calculated T Axis 56 degrees    Diagnosis       Normal sinus rhythm  Normal ECG  No previous ECGs available  Confirmed by DAVE MD, Lezlie Dakin ([id number]) on 6/8/2021 6:58:49 AM     METABOLIC PANEL, BASIC    Collection Time: 06/08/21  3:48 AM   Result Value Ref Range    Sodium 139 136 - 145 mmol/L    Potassium 3.9 3.5 - 5.1 mmol/L    Chloride 109 (H) 97 - 108 mmol/L    CO2 23 21 - 32 mmol/L    Anion gap 7 5 - 15 mmol/L    Glucose 74 65 - 100 mg/dL    BUN 26 (H) 6 - 20 mg/dL    Creatinine 0.80 0.55 - 1.02 mg/dL    BUN/Creatinine ratio 33 (H) 12 - 20      GFR est AA >60 >60 ml/min/1.73m2    GFR est non-AA >60 >60 ml/min/1.73m2    Calcium 9.0 8.5 - 10.1 mg/dL   MAGNESIUM    Collection Time: 06/08/21  3:48 AM   Result Value Ref Range    Magnesium 2.1 1.6 - 2.4 mg/dL   CBC WITH AUTOMATED DIFF    Collection Time: 06/08/21  3:48 AM   Result Value Ref Range    WBC 11.6 (H) 3.6 - 11.0 K/uL    RBC 3.88 3.80 - 5.20 M/uL    HGB 11.4 (L) 11.5 - 16.0 g/dL    HCT 36.1 35.0 - 47.0 %    MCV 93.0 80.0 - 99.0 FL    MCH 29.4 26.0 - 34.0 PG    MCHC 31.6 30.0 - 36.5 g/dL    RDW 13.4 11.5 - 14.5 %    PLATELET 997 141 - 729 K/uL    MPV 10.5 8.9 - 12.9 FL    NRBC 0.0 0.0  WBC    ABSOLUTE NRBC 0.00 0.00 - 0.01 K/uL    NEUTROPHILS 69 32 - 75 %    LYMPHOCYTES 20 12 - 49 %    MONOCYTES 11 5 - 13 %    EOSINOPHILS 0 0 - 7 %    BASOPHILS 0 0 - 1 %    IMMATURE GRANULOCYTES 0 0 - 0.5 %    ABS. NEUTROPHILS 8.0 1.8 - 8.0 K/UL    ABS. LYMPHOCYTES 2.3 0.8 - 3.5 K/UL    ABS. MONOCYTES 1.2 (H) 0.0 - 1.0 K/UL    ABS.  EOSINOPHILS 0.0 0.0 - 0.4 K/UL    ABS. BASOPHILS 0.1 0.0 - 0.1 K/UL    ABS. IMM. GRANS. 0.1 (H) 0.00 - 0.04 K/UL    DF AUTOMATED            Significant Diagnostic Studies  All images independently visualized    MRI BRAIN WO CONT   Final Result   1. No acute finding. 2. Mild changes of suspected small vessel ischemic disease with diffuse cerebral   atrophy. 3. 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 CT HEAD WO CONT   Final Result   1. No acute intracranial findings. 2. Atrophy and small vessel ischemic disease. This document has been prepared by the Dragon voice recognition system, typographical errors may have occurred.  Attempts have been made to correct errors, however inadvertent errors may persist.

## 2021-06-08 NOTE — PROGRESS NOTES
Comprehensive Nutrition Assessment    Type and Reason for Visit: Initial (dysphagia)    Nutrition Recommendations/Plan:     PO diet per SLP and GI   RD to update supplementation as diet advanced    If pt deemed inappropriate for PO, rec'd NGT for nutrition  Initiate Jevity 1.5 at goal of 40mL/hr, continuous  Flush 100mL q4hrs or per MD  Providing 1440kcal, 61g pro, 1330mL fluid (>/= 100% est needs)    Document intakes, BMs in I/Os  Continue weekly measured wts    Nutrition Assessment:  Admitted for progressive dysphagia of solids and liquids, unable to tolerate PO. Per H&P, pt with several prior ED visits for similar reasoning. CT head/CT neck and MRI brain negative. SLP and GI consulted. Currently NPO. EGD scheduled for today, SLP eval pending. RD to monitor. Pt agreeable to supplementation as PO diet advanced. If G-tube warranted, RD to provide TF recs. Labs: unremarkable. Meds: IVF, D3, miralax PRN. Malnutrition Assessment:  Malnutrition Status:  Severe malnutrition    Context:  Acute illness     Findings of the 6 clinical characteristics of malnutrition:   Energy Intake:  7 - 50% or less of est energy requirements for 5 or more days  Weight Loss:  No significant weight loss     Body Fat Loss:  1 - Mild body fat loss, Orbital   Muscle Mass Loss:  7 - Moderate muscle mass loss (mild to temples), Temples (temporalis), Clavicles (pectoralis & deltoids), Scapula (trapezius)  Fluid Accumulation:  No significant fluid accumulation,        Estimated Daily Nutrient Needs:  Energy (kcal): 1340kcal (30kcal/kg); Weight Used for Energy Requirements: Current  Protein (g): 54g (1.2g/kg); Weight Used for Protein Requirements: Current  Fluid (ml/day): 1340mL; Method Used for Fluid Requirements: 1 ml/kcal       Needs for wt gain, geriatric    Nutrition Related Findings:  NFPE finding mild-mod UE wasting, LE intact. Hx of dysphagia noted. +Edentulism. Tongue protruding. Pt stated lips feel numb.  No n/v. Pt reports last BM over the weekend. No edema. Wounds:    None       Current Nutrition Therapies:  DIET NPO    Anthropometric Measures:  · Height:  5' (152.4 cm)  · Current Body Wt:  44.5 kg (98 lb 1.7 oz) (6/8 RD obtained)   · Admission Body Wt:  100 lb (stated 6/7)    · Usual Body Wt:  45.4 kg (100 lb) (pt reports wt stability)     · Ideal Body Wt:  100 lbs:  98.1 %   · BMI Category:  Underweight (BMI less than 22) age over 72     Wt Readings from Last 10 Encounters:   06/07/21 45.4 kg (100 lb)   06/06/21 45.4 kg (100 lb)   06/01/21 47.6 kg (105 lb)   05/22/21 47.6 kg (105 lb)   05/10/21 49.9 kg (110 lb)   11/19/20 49.9 kg (110 lb)   All stated wts per chart review. Updated bed wt consistent with pt UBW. .      Nutrition Diagnosis:   · Biting/chewing (masticatory) difficulty related to partial or complete edentulism (dysphagia of unknown origin) as evidenced by NPO or clear liquid status due to medical condition, poor intake prior to admission (difficulty tolerating PO liquid and solids)      Nutrition Interventions:   Food and/or Nutrient Delivery:  (PO diet per GI/SLP vs G-tube)  Nutrition Education and Counseling: No recommendations at this time  Coordination of Nutrition Care: Continue to monitor while inpatient    Goals:  Initiate means of nutrition to meet >75% est needs, Wt gain vs stability within 0.5kg, Intakes >65% via PO vs NST       Nutrition Monitoring and Evaluation:   Behavioral-Environmental Outcomes: None identified  Food/Nutrient Intake Outcomes: Diet advancement/tolerance  Physical Signs/Symptoms Outcomes: Chewing or swallowing, Nutrition focused physical findings, GI status, Weight    Discharge Planning:     Too soon to determine     Electronically signed by Bernard Boyd on 6/8/2021 at 2:19 PM    Contact: EXT 1988 or via [employer]

## 2021-06-09 ENCOUNTER — APPOINTMENT (OUTPATIENT)
Dept: GENERAL RADIOLOGY | Age: 86
DRG: 391 | End: 2021-06-09
Attending: NURSE PRACTITIONER
Payer: MEDICARE

## 2021-06-09 ENCOUNTER — APPOINTMENT (OUTPATIENT)
Dept: MRI IMAGING | Age: 86
DRG: 391 | End: 2021-06-09
Attending: STUDENT IN AN ORGANIZED HEALTH CARE EDUCATION/TRAINING PROGRAM
Payer: MEDICARE

## 2021-06-09 LAB — TSH SERPL DL<=0.05 MIU/L-ACNC: 0.74 UIU/ML (ref 0.36–3.74)

## 2021-06-09 PROCEDURE — 70553 MRI BRAIN STEM W/O & W/DYE: CPT

## 2021-06-09 PROCEDURE — 97530 THERAPEUTIC ACTIVITIES: CPT

## 2021-06-09 PROCEDURE — 86592 SYPHILIS TEST NON-TREP QUAL: CPT

## 2021-06-09 PROCEDURE — 99222 1ST HOSP IP/OBS MODERATE 55: CPT | Performed by: OTOLARYNGOLOGY

## 2021-06-09 PROCEDURE — 83883 ASSAY NEPHELOMETRY NOT SPEC: CPT

## 2021-06-09 PROCEDURE — 74011250636 HC RX REV CODE- 250/636: Performed by: NURSE PRACTITIONER

## 2021-06-09 PROCEDURE — A9577 INJ MULTIHANCE: HCPCS | Performed by: HOSPITALIST

## 2021-06-09 PROCEDURE — 74011000250 HC RX REV CODE- 250: Performed by: NURSE PRACTITIONER

## 2021-06-09 PROCEDURE — 84443 ASSAY THYROID STIM HORMONE: CPT

## 2021-06-09 PROCEDURE — 65270000029 HC RM PRIVATE

## 2021-06-09 PROCEDURE — 92526 ORAL FUNCTION THERAPY: CPT

## 2021-06-09 PROCEDURE — 84156 ASSAY OF PROTEIN URINE: CPT

## 2021-06-09 PROCEDURE — 31575 DIAGNOSTIC LARYNGOSCOPY: CPT | Performed by: OTOLARYNGOLOGY

## 2021-06-09 PROCEDURE — 74011250637 HC RX REV CODE- 250/637: Performed by: NURSE PRACTITIONER

## 2021-06-09 PROCEDURE — 36415 COLL VENOUS BLD VENIPUNCTURE: CPT

## 2021-06-09 PROCEDURE — 84155 ASSAY OF PROTEIN SERUM: CPT

## 2021-06-09 PROCEDURE — 83519 RIA NONANTIBODY: CPT

## 2021-06-09 PROCEDURE — 74011250636 HC RX REV CODE- 250/636: Performed by: HOSPITALIST

## 2021-06-09 RX ORDER — HYDROXYZINE HYDROCHLORIDE 25 MG/ML
25 INJECTION, SOLUTION INTRAMUSCULAR
Status: COMPLETED | OUTPATIENT
Start: 2021-06-09 | End: 2021-06-09

## 2021-06-09 RX ORDER — HYDROXYZINE 50 MG/ML
25 INJECTION, SOLUTION INTRAMUSCULAR ONCE
Status: ACTIVE | OUTPATIENT
Start: 2021-06-09 | End: 2021-06-10

## 2021-06-09 RX ORDER — METOPROLOL TARTRATE 5 MG/5ML
5 INJECTION INTRAVENOUS EVERY 6 HOURS
Status: DISCONTINUED | OUTPATIENT
Start: 2021-06-09 | End: 2021-06-10

## 2021-06-09 RX ORDER — FACIAL-BODY WIPES
10 EACH TOPICAL DAILY PRN
Status: DISCONTINUED | OUTPATIENT
Start: 2021-06-09 | End: 2021-06-15 | Stop reason: HOSPADM

## 2021-06-09 RX ORDER — HYDROXYZINE 50 MG/ML
25 INJECTION, SOLUTION INTRAMUSCULAR
Status: DISCONTINUED | OUTPATIENT
Start: 2021-06-09 | End: 2021-06-12

## 2021-06-09 RX ADMIN — HYDROXYZINE HYDROCHLORIDE 25 MG: 25 INJECTION, SOLUTION INTRAMUSCULAR at 13:00

## 2021-06-09 RX ADMIN — GADOBENATE DIMEGLUMINE 10 ML: 529 INJECTION, SOLUTION INTRAVENOUS at 14:04

## 2021-06-09 RX ADMIN — Medication 10 ML: at 13:00

## 2021-06-09 RX ADMIN — NYSTATIN 500000 UNITS: 100000 SUSPENSION ORAL at 12:31

## 2021-06-09 RX ADMIN — NYSTATIN 500000 UNITS: 100000 SUSPENSION ORAL at 09:00

## 2021-06-09 RX ADMIN — NYSTATIN 500000 UNITS: 100000 SUSPENSION ORAL at 18:10

## 2021-06-09 RX ADMIN — HYDROXYZINE HYDROCHLORIDE 25 MG: 50 INJECTION, SOLUTION INTRAMUSCULAR at 20:45

## 2021-06-09 RX ADMIN — METOPROLOL TARTRATE 5 MG: 5 INJECTION INTRAVENOUS at 18:10

## 2021-06-09 RX ADMIN — NYSTATIN 500000 UNITS: 100000 SUSPENSION ORAL at 20:49

## 2021-06-09 NOTE — PROGRESS NOTES
LOS skin assessment completed by myself and Katie Segura R.N. patient has a pink sacrum, zquard applied, two skin tears to left arm. And healing bruise to left side of forehead and eye.

## 2021-06-09 NOTE — PROGRESS NOTES
Physician Progress Note      PATIENTDannial Lay  CSN #:                  [id number]  :                       1935  ADMIT DATE:       2021 12:48 PM  [address] [place] DATE:  RESPONDING  PROVIDER #:        Dave CARRANZA NP          QUERY TEXT:    Dear Ms. Paula Anguiano -    Patient admitted with progressive dysphagia and dehydration. If possible, please document in progress notes and discharge summary if you are evaluating and /or treating any of the following: The medical record reflects the following:  Risk Factors: 80 F admitted with progressive dysphagia and dehydration  Clinical Indicators: per Nutrition assessment documented 21, patient with severe malnutrition AEB: energy intake 50% or less of est energy requirements for 5 or more days, mild body fat loss, moderate muscle mass loss; BMI 19.16; total protein 8.4; albumin 4.0; progressive dysphagia of solids and liquids, unable to tolerate PO with multiple ED visits re: this issue  Treatment: labs, Nutrition consult, GI consult, ENT consult, ST eval and treat, EGD, laryngoscopy, MBS, IV fluids    Thank you,  PAULINE RussellN, RN, [place]  Clinical   [phone]  Options provided:  -- Protein calorie malnutrition mild  -- Protein calorie malnutrition moderate  -- Protein calorie malnutrition severe  -- Underweight with BMI 19.16  -- Other - I will add my own diagnosis  -- Disagree - Not applicable / Not valid  -- Disagree - Clinically unable to determine / Unknown  -- Refer to Clinical Documentation Reviewer    PROVIDER RESPONSE TEXT:    This patient has severe protein calorie malnutrition.     Query created by: Hanny Dubose on 2021 9:20 AM      Electronically signed by:  Therese Rangel NP 2021 9:23 AM

## 2021-06-09 NOTE — CONSULTS
Otolaryngology-Head and Neck Surgery  Inpatient Consultation    Patient: Matt Thompson  YOB: 1935  MRN: [id number]  Date of Service:  6/9/2021    Reason for Consultation:  Dysphagia   Requesting Physician:  Miladys Sierra    History of Present Illness: Matt Thompson is a 80y.o. year old female who was admitted to the hospital yesterday with progressive dysphagia, ongoing for the last few weeks. Getting worse. Initially difficulty with solid, now with difficulty with all consistencies. Occasional throat pain. Difficulty with dysarthria which is also new    No prior swallowing issues    Had EGD yesterday afternoon with reports of hiatal hernia     Past Medical History:  Past Medical History:   Diagnosis Date    Arthritis     GERD (gastroesophageal reflux disease)     Hypertension     Other ill-defined conditions(799.89)     Diverticulitis    Psychiatric disorder     depression       Past Surgical History:   Past Surgical History:   Procedure Laterality Date    HX GI  2010    Colon resection    HX GYN  1973    Hysterectomy    HX HEENT  2013    Laser eye surgery on left    HX ORTHOPAEDIC  1995    Left rotator cuff surgery    HX ORTHOPAEDIC      right 2nd finger       Medications:   Current Outpatient Medications   Medication Instructions    amLODIPine (NORVASC) 5 mg, Oral, DAILY    aspirin (ASPIRIN) 325 mg, Oral, DAILY    cholecalciferol, vitamin D3, (VITAMIN D3) 2,000 unit Tab 1 Tablet, DAILY    hydrOXYzine pamoate (VISTARIL) 25 mg, Oral, 3 TIMES DAILY AS NEEDED    losartan (COZAAR) 50 mg, Oral, DAILY    methylPREDNISolone (Medrol, Rey,) 4 mg tablet As directed    nitrofurantoin, macrocrystal-monohydrate, (Macrobid) 100 mg capsule 100 mg, Oral, 2 TIMES DAILY    traZODone (DESYREL) 100 mg, EVERY BEDTIME       Allergies:    Allergies   Allergen Reactions    Penicillin G Anaphylaxis    Codeine Other (comments)     I can't walk and don't have control over my body       Social History: Social History     Socioeconomic History    Marital status:    Tobacco Use    Smoking status: Former Smoker    Smokeless tobacco: Former User   Substance and Sexual Activity    Alcohol use: No    Drug use: No    Sexual activity: Not Currently   Other Topics Concern     Family History:  Family History   Problem Relation Age of Onset    Lung Disease Mother     Diabetes Sister     Heart Disease Sister        Review of Systems:  Consitutional: denies fever, excessive weight gain or loss. Eyes: denies diplopia, eye pain. Integumentary: denies new concerning skin lesions. Ears, Nose, Mouth, Throat: denies except as per HPI. Endocrine: denies hot or cold intolerance, increased thirst.  Respiratory: denies cough, hemoptysis, wheezing  Gastrointestinal: denies trouble swallowing, nausea, emesis, regurgitation  Musculoskeletal: denies muscle weakness or wasting  Cardiovascular: denies chest pain, shortness of breath  Neurologic: denies seizures, numbness or tingling, syncope  Hematologic: denies easy bleeding or bruising    Physical Examination:   Visit Vitals  BP (!) 154/87   Pulse 99   Temp 97.8 °F (36.6 °C)   Resp 18   Ht 5' (1.524 m)   Wt 98 lb 1.7 oz (44.5 kg)   SpO2 99%   BMI 19.16 kg/m²       General: Comfortable, pleasant, appears stated age  Voice:  Dysarthria   Face: No masses or lesions, facial strength symmetric. Resolving left orbiital ecchymosis     Ears: External ears unremarkable. Bilateral ear canal clear. Tympanic membrane clear and intact, with visible landmarks. Clear middle ear space  Nose: External nose unremarkable. Dorsum midline. Anterior rhinoscopy demonstrates no lesions. Septum midline. Turbinates without hypertrophy. Oral Cavity / Oropharynx: No trismus. Very dry oral mucosa with thick white exudate overlying pharynx and tongue. Tongue with restricted mobility in all directions, though mobile, and does not deviate. Palate elevates symmetrically. Uvula midline.  Tonsils unremarkable. Base of tongue soft. Floor of mouth soft. Neck: Supple. No adenopathy. Thyroid unremarkable. Palpable laryngeal landmarks. Full neck range of motion     PROCEDURE: FLEXIBLE FIBEROPTIC LARYNGOSCOPY    Preoperative Diagnosis: Dysphagia, dysarthria       Postoperative Diagnosis: Same as above     Procedure: Flexible Fiberoptic Laryngoscopy    Anesthesia: Topical 4% Lidocaine, Oxymetazoline    Description of Procedure: Verbal informed consent was obtained. After application of topical anesthetic and decongestant, the flexible fiberoptic endoscope was introduced to the patient's nare. It was passed through the nose and into the pharynx. The scope was then withdrawn and repeated on the opposing nare. The patient tolerated the procedure well. Findings: Normal nasal cavity, no polyposis or purulence. Middle meatus clear bilaterally. Nasopharynx without lesions. Base of tongue, vallecula & epiglottis unremarkable. Clear pyriform sinus. Vocal folds without lesions. Normal mobility. Visualized subglottis appears patent. MRI head  IMPRESSION  1. No acute finding. 2. Mild changes of suspected small vessel ischemic disease with diffuse cerebral  atrophy. 3. 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 CT neck  IMPRESSION  1. No acute intracranial findings. 2. Atrophy and small vessel ischemic disease. Assessment and Plan:   1. Dysphagia  2.  Dysarthria   - Normal scope exam  - Oral exam suggests thrush may be contributing factor to dysarthria and reduced tongue mobility  - Defer to primary team re: treatment, but consider mycelex darvin if PO diflucan not tolerated  - Noted barium swallow plans today      Klaey Mueller MD   [facility]  [address] [place] [address]  Office Phone: [phone]

## 2021-06-09 NOTE — PROGRESS NOTES
PHYSICAL THERAPY TREATMENT  Patient: Prem Contreras (89 y.o. female)  Date: 6/9/2021  Diagnosis: Dysphagia [R13.10]  Dysarthria [R47.1] <principal problem not specified>  Procedure(s) (LRB):  ESOPHAGOGASTRODUODENOSCOPY (EGD) (N/A) 1 Day Post-Op  Precautions:    Chart, physical therapy assessment, plan of care and goals were reviewed. ASSESSMENT  Patient continues with skilled PT services and is progressing towards goals. Pt. Semi supine in bed upon arrival and agreeable to therapy session. Able to perform bed mobility and transfer with supervision. Able to ambulate with RW at [facility] for 100'. Reports feeling very fatigued with ambulation. Impulsively returned to bed and plopped back in. Tolerated supine LE TE. Recommend DC to hhpt with 24/7 care vs. SNF    Current Level of Function Impacting Discharge (mobility/balance): Cognition, endurance,motivation    Other factors to consider for discharge: PMH         PLAN :  Patient continues to benefit from skilled intervention to address the above impairments. Continue treatment per established plan of care. to address goals. Recommendation for discharge: (in order for the patient to meet his/her long term goals)  To be determined: HHPT VS. SNF    This discharge recommendation:  Has been made in collaboration with the attending provider and/or case management    IF patient discharges home will need the following DME: rolling walker       SUBJECTIVE:   Patient stated IM TIRED.     OBJECTIVE DATA SUMMARY:   Critical Behavior:  Neurologic State: Alert  Orientation Level: Oriented X4  Cognition: Appropriate decision making  Safety/Judgement: Awareness of environment  Functional Mobility Training:  Bed Mobility:  Rolling: Supervision  Supine to Sit: Supervision  Sit to Supine: Supervision  Scooting: Supervision        Transfers:  Sit to Stand: Contact guard assistance  Stand to Sit: Contact guard assistance  Stand Pivot Transfers: Contact guard assistance Balance:  Sitting: Intact  Standing: Intact  Standing - Static: Good;Constant support  Standing - Dynamic : Good;Constant support  Ambulation/Gait Training:  Distance (ft): 100 Feet (ft)  Assistive Device: Walker, rolling;Gait belt  Ambulation - Level of Assistance: Contact guard assistance        Gait Abnormalities: Steppage gait        Base of Support: Narrowed     Speed/Clau: Slow                       Stairs: Therapeutic Exercises:   Therapeutic Exercises:       EXERCISE   Sets   Reps   Active Active Assist   Passive Self ROM   Comments   Ankle Pumps  10 [x] [] [] []    Quad Sets/Glut Sets   [] [] [] []    Hamstring Sets   [] [] [] []    Short Arc Quads   [] [] [] []    Heel Slides  10 [x] [] [] []    Straight Leg Raises  10 1 [] [] []    Hip abd/add   [] [] [] []    Long Arc Quads   [] [] [] []    Marching   [] [] [] []       [] [] [] []        Pain Ratin    Activity Tolerance:   Fair  Please refer to the flowsheet for vital signs taken during this treatment. After treatment patient left in no apparent distress:   Supine in bed    COMMUNICATION/COLLABORATION:   The patients plan of care was discussed with: Physical therapy assistant.      Melissa Larsen PTA   Time Calculation: 15 mins

## 2021-06-09 NOTE — PROGRESS NOTES
Patient has been accepted at [facility] pending bed availability at time of discharge.

## 2021-06-09 NOTE — PROGRESS NOTES
SPEECH LANGUAGE PATHOLOGY DYSPHAGIA TREATMENT  Patient: Dwayne Bermudez (28 y.o. female)  Date: 6/9/2021  Diagnosis: Dysphagia [R13.10]  Dysarthria [R47.1]   Procedure(s) (LRB):  ESOPHAGOGASTRODUODENOSCOPY (EGD) (N/A) 1 Day Post-Op  Precautions:  Aspiration and fall    ASSESSMENT :  Patient arrived to radiology for MBS. Patient w/ increased reduced lingual ROM and strength w/ R deviation and absent lingual elevation and increased dysarthria. Patient w/ poor secretion management. Attempted MBS, w/ trials of thin, mildly thick(nectar), moderately thick(honey) and pudding trials. Patient w/ poor bolus formation and control w/ significant anterior spillage and absent A-P transit. Patient maintain neutral neck or achieve hyperextension of neck. Observed single reflexive swallow w/ curled epiglottis that does not invert. Study terminated s/t inability to achieve oral initiation. Patient dysphagia and dysarthria worsened since initial eval.   Patient will benefit from skilled intervention to address the above impairments. Patients rehabilitation potential is considered to be Guarded     PLAN :  Recommendations and Planned Interventions:  Rec NPO w/ alternative means of nutrition as medically appropriate. Frequency/Duration: Patient will be followed by speech-language pathology 5 times a week to address goals. Discharge Recommendations: Skilled Nursing Facility     SUBJECTIVE:   Patient reports worsening dysphagia and dysarthria.     OBJECTIVE:     Past Medical History:   Diagnosis Date    Arthritis     GERD (gastroesophageal reflux disease)     Hypertension     Other ill-defined conditions(119.89)     Diverticulitis    Psychiatric disorder     depression       Current Diet:  DIET NPO     Cognitive and Communication Status:  Neurologic State: Alert  Orientation Level: Oriented X4  Cognition: Appropriate decision making  Perception: Appears intact  Perseveration: No perseveration noted  Safety/Judgement: Awareness of environment               Pain:  Pain Scale 1: Numeric (0 - 10)  Pain Intensity 1: 0       After treatment:   Patient left in no apparent distress sitting up in chair and w/ radiology staff. COMMUNICATION/EDUCATION:   Patient was educated regarding nutrition recs and s/s aspiration. The patient's plan of care including recommendations, planned interventions, and recommended diet changes were discussed with:  NP .     Patient/family agree to work toward stated goals and plan of care. Thank you for this referral.  Ritu Narayanan M.S., M.Ed., CCC-SLP  Time Calculation: 18 mins    Problem: Dysphagia (Adult)  Goal: *Acute Goals and Plan of Care (Insert Text)  Description: Speech Therapy Swallow Goals  Initiated 6/8/2021    -Patient will tolerate PO trials without clinical indicators of aspiration given minimal cues within 7 day(s). -Patient will participate in modified barium swallow study within 7 day(s). -Patient will demonstrate understanding of swallow safety precautions and aspiration precautions, diet recs with minimal cues within 7 day(s).         Outcome: Not Progressing Towards Goal

## 2021-06-09 NOTE — PROGRESS NOTES
Progress Note    Patient: Diann Tee MRN: [id number]  SSN: [id number]    YOB: 1935  Age: 80 y.o.   Sex: female      Admit Date: 6/7/2021    LOS: 1 day     Subjective:   Patient examined, still have difficult swallow,   Swallow study result noted,   Past Medical History:   Diagnosis Date    Arthritis     GERD (gastroesophageal reflux disease)     Hypertension     Other ill-defined conditions(799.89)     Diverticulitis    Psychiatric disorder     depression        Current Facility-Administered Medications:     hydrOXYzine (VISTARIL) injection 25 mg, 25 mg, IntraMUSCular, ONCE, Conrad Correa NP    metoprolol (LOPRESSOR) injection 5 mg, 5 mg, IntraVENous, Q6H, Conrad Correa NP    nystatin (MYCOSTATIN) 100,000 unit/mL oral suspension 500,000 Units, 500,000 Units, Oral, QID, Conrad Correa NP, 500,000 Units at 06/09/21 1231    dextrose 5 % - 0.45% NaCl infusion, 75 mL/hr, IntraVENous, CONTINUOUS, Conrad Correa NP, Last Rate: 75 mL/hr at 06/08/21 1708, 75 mL/hr at 06/08/21 1708    lidocaine (XYLOCAINE) 4 % (40 mg/mL) topical solution, , Topical, PRN, Yamilet Perez MD    [Held by provider] amLODIPine (NORVASC) tablet 5 mg, 5 mg, Oral, DAILY, David Wilson MD    aspirin tablet 325 mg, 325 mg, Oral, DAILY, Jemma Wild MD    [Held by provider] cholecalciferol (VITAMIN D3) (1000 Units /25 mcg) tablet 1,000 Units, 1,000 Units, Oral, DAILY, Jemma Wilson MD    hydrOXYzine pamoate (VISTARIL) capsule 25 mg, 25 mg, Oral, TID PRN, Jennifer Hassan MD    [Held by provider] losartan (COZAAR) tablet 50 mg, 50 mg, Oral, DAILY, Jemma Wilson MD    hydrALAZINE (APRESOLINE) 20 mg/mL injection 10 mg, 10 mg, IntraVENous, Q6H PRN, Jennifer Hassan MD, 10 mg at 06/08/21 2346    sodium chloride (NS) flush 5-40 mL, 5-40 mL, IntraVENous, Jemma Kumar MD, 10 mL at 06/09/21 1300    sodium chloride (NS) flush 5-40 mL, 5-40 mL, IntraVENous, PRN, David Wilson MD    acetaminophen (TYLENOL) tablet 650 mg, 650 mg, Oral, Q6H PRN **OR** acetaminophen (TYLENOL) suppository 650 mg, 650 mg, Rectal, Q6H PRN, David Wilson MD    polyethylene glycol (MIRALAX) packet 17 g, 17 g, Oral, DAILY PRN, David Wilson MD    ondansetron (ZOFRAN ODT) tablet 4 mg, 4 mg, Oral, Q8H PRN **OR** ondansetron (ZOFRAN) injection 4 mg, 4 mg, IntraVENous, Q6H PRN, David Wilson MD    enoxaparin (LOVENOX) injection 30 mg, 30 mg, SubCUTAneous, DAILY, David Wilson MD    Objective:     Vitals:    06/08/21 2000 06/08/21 2338 06/08/21 2341 06/09/21 0749   BP: (!) 158/87 (!) 165/89 (!) 154/87 (!) 174/97   Pulse: 84 99  91   Resp: 17 18  18   Temp: 98 °F (36.7 °C) 97.8 °F (36.6 °C)  98.1 °F (36.7 °C)   SpO2: 98% 99%  97%   Weight:       Height:            Intake and Output:  Current Shift: No intake/output data recorded. Last three shifts: 06/07 1901 - 06/09 0700  In: 50 [I.V.:50]  Out: 1275 [Urine:1275]    Physical Exam:   Physical Exam     Lab/Data Review:  Recent Results (from the past 24 hour(s))   TSH 3RD GENERATION    Collection Time: 06/09/21  8:40 AM   Result Value Ref Range    TSH 0.74 0.36 - 3.74 uIU/mL        MRI BRAIN WO CONT   Final Result   1. No acute finding. 2. Mild changes of suspected small vessel ischemic disease with diffuse cerebral   atrophy. 3. 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 CT HEAD WO CONT   Final Result   1. No acute intracranial findings. 2. Atrophy and small vessel ischemic disease. MRI BRAIN W WO CONT    (Results Pending)    Patient w/ poor bolus formation and control w/ significant anterior spillage and absent A-P transit. Patient maintain neutral neck or achieve hyperextension of neck.       Assessment:     Active Problems:    Dysphagia (6/7/2021) Dysarthria (6/8/2021)    Dysphagia   , patient unable to tolerate p.o. solids or liquids of note   unclear etiology  patient CT head/CT soft tissue of neck/MRI brain is negative,      General weakness, malnutrition,  Vitamin D deficiencycontinue home medications  Dehydration    EGD only showed hiatal hernia, dysmotility  Plan:   Wait MRI results  Nutrition speech to follow  Likely patient needs a  PEG tube placement for nutrition support, waiting for the family member with final decision  Signed By: Arnold Jin MD     June 9, 2021        Thank you for allowing me to participate in this patients care  Cc Referring Physician   Samreen Ramírez MD

## 2021-06-09 NOTE — PROGRESS NOTES
Reason for Admission:   Dysphagia                    RUR Score: 21%                 PCP: First and Last name:   John Porter MD     Name of Practice:    Are you a current patient: Yes/No: Yes   Approximate date of last visit: 1 month ago   Can you participate in a virtual visit if needed: No    Do you (patient/family) have any concerns for transition/discharge? Plan for utilizing home health:   Not appropriate at this time    Current Advanced Directive/Advance Care Plan:  DNR      Healthcare Decision Maker:  Christo Berg (granddaughter) [phone] and another point of contact is Selena's mother (patient daughter-in-law) [phone]  Click here to [address] including selection of the Healthcare Decision Maker Relationship (ie \"Primary\")              Transition of Care Plan:   Met with patient at bedside to complete assessment. Patients speech is a little garbled, but comprehensible. Patient lives alone and still drives. She uses a cane at home. Informed patient that PT has recommended SNF and patient has requested to go to [facility] and Rehab and referral sent.

## 2021-06-09 NOTE — PROGRESS NOTES
NEUROLOGY  PROGRESS NOTE    Admission History/Pertinent Events  Selena Ness is a 80y.o. year old female who presented on 6/7/2021. Patient has a past medical history of HTN, Osteoarthritis, GERD, Diverticulitis, Anxiety/Depression who presented with difficulty swallowing. Per chart review, patient has had multiple visits to the ED for difficulty with her swallowing. Over the last few weeks prior to presentation patient reportedly has been having worsening difficulty with swallowing solids which has progressed to liquids as well. Patient unable to keep any foods down because of this. Emergent CT head was negative for any acute findings. In the ED, patient was found to have a leukocytosis of 12.9, slightly elevated creatinine of 1.06.      ASSESSMENT/PLAN      Impression  Patient unable to participate in respiratory function testing. Contrasted MRI of the brain without any evidence of inferior brainstem pathology or cranial nerve enhancement. Will follow-up on myasthenia gravis panel and rest of laboratory workup and plan for outpatient NCS/EMG to evaluate for any evidence of motor neuron disease. [address]  [facility]     MRI Brain WO  [facility]  Remote left parieto-occipital hemosiderosis likely from remote [address]  [facility]    MRI Brain WWO  [facility]  No abnormal enhancement    Labs  Negative/WNL: TSH 0.74      Plan      Dysphagia, Dysarthria  Remote Left ParietoOccipital SAH   Cerebral Chronic Microvascular Ischemic Changes  Associated: Gastroesophageal Hiatal Hernia  -Neurovascular Prophylaxis:  (patient already on this)  -PT/OT/ST as needed  -Management of metabolic/infectious derangements to referring teams  -GI following  -Plan for outpatient NCS/EMG to evaluate for motor neuron disease  -Patient unable to participate in respiratory function testing  -F/U RPR, SPEP, UPEP, Free Light Chains, MG Panel        SUBJECTIVE   No significant changes on patient's neurological examination.   Patient reports subjectively feeling better.       Physical/Neurological Exam  Patient awake, alert; following central and peripheral commands   No expressive or receptive aphasia; moderate to severe dysarthria  Pupils react to light bilaterally; EOM Intact   No visual field deficits on gross exam   Intact to light touch on face bilaterally   No facial droop   Normal tongue movements noted  Motor: 3+/5 throughout  No abnormal movements   Sensation to light touch intact grossly throughout       OBJECTIVE  Vital Signs  Temp:  [97.6 °F (36.4 °C)-98.9 °F (37.2 °C)]   Pulse (Heart Rate):  []   BP: (104-194)/(45-94)   Resp Rate:  [17-24]   O2 Sat (%):  [96 %-99 %]   Weight:  [44.5 kg (98 lb 1.7 oz)-45.4 kg (100 lb)]     MEDICATIONS    Current Facility-Administered Medications:     nystatin (MYCOSTATIN) 100,000 unit/mL oral suspension 500,000 Units, 500,000 Units, Oral, QID, Conrad Correa NP, 500,000 Units at 06/08/21 2013    dextrose 5 % - 0.45% NaCl infusion, 75 mL/hr, IntraVENous, CONTINUOUS, Conrad Correa NP, Last Rate: 75 mL/hr at 06/08/21 1708, 75 mL/hr at 06/08/21 1708    lidocaine (XYLOCAINE) 4 % (40 mg/mL) topical solution, , Topical, PRN, Khalif Vargas MD    [Held by provider] amLODIPine (NORVASC) tablet 5 mg, 5 mg, Oral, DAILY, David Wilson MD    aspirin tablet 325 mg, 325 mg, Oral, DAILY, Doc Wilson MD    [Held by provider] cholecalciferol (VITAMIN D3) (1000 Units /25 mcg) tablet 1,000 Units, 1,000 Units, Oral, DAILY, David Wilson MD    hydrOXYzine pamoate (VISTARIL) capsule 25 mg, 25 mg, Oral, TID PRN, Subhash Clarke MD    [Held by provider] losartan (COZAAR) tablet 50 mg, 50 mg, Oral, DAILY, David Wilson MD    hydrALAZINE (APRESOLINE) 20 mg/mL injection 10 mg, 10 mg, IntraVENous, Q6H PRN, Subhash Clarke MD, 10 mg at 06/08/21 5897    sodium chloride (NS) flush 5-40 mL, 5-40 mL, IntraVENous, Q8H, David Patel Ardie Kussmaul, MD, 10 mL at 06/08/21 2008    sodium chloride (NS) flush 5-40 mL, 5-40 mL, IntraVENous, PRN, Kardar, Ahmed Ardie Kussmaul, MD    acetaminophen (TYLENOL) tablet 650 mg, 650 mg, Oral, Q6H PRN **OR** acetaminophen (TYLENOL) suppository 650 mg, 650 mg, Rectal, Q6H PRN, Kardar, Ahmed Ardie Kussmaul, MD    polyethylene glycol (MIRALAX) packet 17 g, 17 g, Oral, DAILY PRN, Kardar, Ahmed Ardie Kussmaul, MD    ondansetron (ZOFRAN ODT) tablet 4 mg, 4 mg, Oral, Q8H PRN **OR** ondansetron (ZOFRAN) injection 4 mg, 4 mg, IntraVENous, Q6H PRN, Kardar, Ahmed Ardie Kussmaul, MD    enoxaparin (LOVENOX) injection 30 mg, 30 mg, SubCUTAneous, DAILY, Tyrel Wild MD      Labs: I've reviewed the labs for today     This document has been prepared by the Dragon voice recognition system, typographical errors may have occurred.  Attempts have been made to correct errors, however inadvertent errors may persist.

## 2021-06-09 NOTE — PROGRESS NOTES
Hospitalist Progress Note    Subjective:   Daily Progress Note: 6/9/2021 5:59 PM    Hospital Course:  Pt admitted for complaints of dysphagia and dysarthria. Pt unable to swallow food or liquids for last several days, has lost significant weight. Presents with dysarthria, thickened speech and dysphagia. 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 Pt underwent an EGD , showing hiatal hernia at GE junction. Pt could not tolerate barium swallow, test was not done today, MRI with contrast did not show   Evidence of acute intracranial ischemia, mass or hemorrhage. There is mild to moderate chronic vessel ischemic changes. Subjective: Pt seen in room, discussed PEG placement for nutritional support. Still having garbled speech.      Current Facility-Administered Medications   Medication Dose Route Frequency    hydrOXYzine (VISTARIL) injection 25 mg  25 mg IntraMUSCular ONCE    metoprolol (LOPRESSOR) injection 5 mg  5 mg IntraVENous Q6H    nystatin (MYCOSTATIN) 100,000 unit/mL oral suspension 500,000 Units  500,000 Units Oral QID    dextrose 5 % - 0.45% NaCl infusion  75 mL/hr IntraVENous CONTINUOUS    lidocaine (XYLOCAINE) 4 % (40 mg/mL) topical solution   Topical PRN    [Held by provider] amLODIPine (NORVASC) tablet 5 mg  5 mg Oral DAILY    aspirin tablet 325 mg  325 mg Oral DAILY    [Held by provider] cholecalciferol (VITAMIN D3) (1000 Units /25 mcg) tablet 1,000 Units  1,000 Units Oral DAILY    hydrOXYzine pamoate (VISTARIL) capsule 25 mg  25 mg Oral TID PRN    [Held by provider] losartan (COZAAR) tablet 50 mg  50 mg Oral DAILY    hydrALAZINE (APRESOLINE) 20 mg/mL injection 10 mg  10 mg IntraVENous Q6H PRN    sodium chloride (NS) flush 5-40 mL  5-40 mL IntraVENous Q8H    sodium chloride (NS) flush 5-40 mL  5-40 mL IntraVENous PRN    acetaminophen (TYLENOL) tablet 650 mg  650 mg Oral Q6H PRN    Or    acetaminophen (TYLENOL) suppository 650 mg 650 mg Rectal Q6H PRN    polyethylene glycol (MIRALAX) packet 17 g  17 g Oral DAILY PRN    ondansetron (ZOFRAN ODT) tablet 4 mg  4 mg Oral Q8H PRN    Or    ondansetron (ZOFRAN) injection 4 mg  4 mg IntraVENous Q6H PRN    enoxaparin (LOVENOX) injection 30 mg  30 mg SubCUTAneous DAILY        Review of Systems:    Review of Systems   Constitutional: Negative for chills and fever. HENT: Negative for congestion and sore throat. Respiratory: Negative for cough and shortness of breath. Cardiovascular: Negative for chest pain and palpitations. Gastrointestinal: Negative for heartburn and nausea. Genitourinary: Negative for dysuria and urgency. Neurological: Negative for dizziness and headaches. Objective:     Visit Vitals  BP (!) 140/83   Pulse 99   Temp 98.3 °F (36.8 °C)   Resp 18   Ht 5' (1.524 m)   Wt 44.5 kg (98 lb 1.7 oz)   SpO2 98%   BMI 19.16 kg/m²      O2 Device: None (Room air)    Temp (24hrs), Av.1 °F (36.7 °C), Min:97.8 °F (36.6 °C), Max:98.3 °F (36.8 °C)      No intake/output data recorded.  1901 -  0700  In: 50 [I.V.:50]  Out: 1275 [Urine:1275]    PHYSICAL EXAM:    Physical Exam  Constitutional:       General: She is not in acute distress. HENT:      Mouth/Throat:      Mouth: Mucous membranes are dry. Comments: Oral candidasis  Cardiovascular:      Rate and Rhythm: Normal rate and regular rhythm. Pulmonary:      Effort: Pulmonary effort is normal.      Breath sounds: Normal breath sounds. Abdominal:      General: Bowel sounds are normal.   Musculoskeletal:         General: Normal range of motion. Skin:     General: Skin is warm and dry. Neurological:      Mental Status: She is alert and oriented to person, place, and time.       Comments: Garbled speech, dysarthria            Data Review    Recent Results (from the past 24 hour(s))   TSH 3RD GENERATION    Collection Time: 21  8:40 AM   Result Value Ref Range    TSH 0.74 0.36 - 3.74 uIU/mL       MRI BRAIN W WO CONT   Final Result   1. No evidence of acute intracranial ischemia, mass or hemorrhage. 2.  Mild to moderate chronic small vessel ischemic changes. 3.  No abnormal parenchymal enhancement or suspicious cranial nerve enhancement   identified. Please note study was not optimized for evaluation of the posterior   fossa or cranial nerves. MRI BRAIN WO CONT   Final Result   1. No acute finding. 2. Mild changes of suspected small vessel ischemic disease with diffuse cerebral   atrophy. 3. 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 CT HEAD WO CONT   Final Result   1. No acute intracranial findings. 2. Atrophy and small vessel ischemic disease. Active Problems:    Dysphagia (6/7/2021)      Dysarthria (6/8/2021)        Assessment/Plan:     1. Dysarthria/dysphagia- ? Etiology, MRI brain showing left parieto occipital meningeal hemosiderosis likely from remote subarachnoid hemorrhage,MRI does not show acute infarction mass or hemorrhage. Myasthenia gravis panel sent, neurology following,  Recommending PEG placement for nutritional support, GI following,  S/p EGD shows hiatal hernia, continue IV PPI     2. Hypertension- prn hydralazine w/parameters     3.  Hx of falls- OT/PT, OOB with assistance.     4. Severe protein malnutrition- secondary to dysphagia, consult to nutrition for PEG tube recommendations.              DVT Prophylaxis: lovenox  Code Status:  DNR  POA:  Crystal Favret [phone] [facility] discussed with:   ______CM, staff nurse, patient, family, GI, neurology_________________________________________________________    Alesia Redding NP

## 2021-06-10 ENCOUNTER — APPOINTMENT (OUTPATIENT)
Dept: ENDOSCOPY | Age: 86
DRG: 391 | End: 2021-06-10
Attending: INTERNAL MEDICINE
Payer: MEDICARE

## 2021-06-10 ENCOUNTER — ANESTHESIA EVENT (OUTPATIENT)
Dept: ENDOSCOPY | Age: 86
DRG: 391 | End: 2021-06-10
Payer: MEDICARE

## 2021-06-10 LAB
GLUCOSE BLD STRIP.AUTO-MCNC: 127 MG/DL (ref 65–117)
PERFORMED BY, TECHID: ABNORMAL
RPR SER QL: NONREACTIVE

## 2021-06-10 PROCEDURE — 97530 THERAPEUTIC ACTIVITIES: CPT

## 2021-06-10 PROCEDURE — 65270000029 HC RM PRIVATE

## 2021-06-10 PROCEDURE — 74011250636 HC RX REV CODE- 250/636: Performed by: INTERNAL MEDICINE

## 2021-06-10 PROCEDURE — 74011000250 HC RX REV CODE- 250: Performed by: NURSE PRACTITIONER

## 2021-06-10 PROCEDURE — 74011250637 HC RX REV CODE- 250/637: Performed by: NURSE PRACTITIONER

## 2021-06-10 PROCEDURE — 99231 SBSQ HOSP IP/OBS SF/LOW 25: CPT | Performed by: OTOLARYNGOLOGY

## 2021-06-10 PROCEDURE — C9113 INJ PANTOPRAZOLE SODIUM, VIA: HCPCS | Performed by: NURSE PRACTITIONER

## 2021-06-10 PROCEDURE — 74011250636 HC RX REV CODE- 250/636: Performed by: NURSE PRACTITIONER

## 2021-06-10 PROCEDURE — 82962 GLUCOSE BLOOD TEST: CPT

## 2021-06-10 RX ORDER — METOPROLOL TARTRATE 5 MG/5ML
2.5 INJECTION INTRAVENOUS EVERY 6 HOURS
Status: DISCONTINUED | OUTPATIENT
Start: 2021-06-10 | End: 2021-06-12

## 2021-06-10 RX ORDER — KETOROLAC TROMETHAMINE 15 MG/ML
15 INJECTION, SOLUTION INTRAMUSCULAR; INTRAVENOUS
Status: DISCONTINUED | OUTPATIENT
Start: 2021-06-10 | End: 2021-06-12

## 2021-06-10 RX ADMIN — NYSTATIN 500000 UNITS: 100000 SUSPENSION ORAL at 12:38

## 2021-06-10 RX ADMIN — Medication 10 ML: at 20:23

## 2021-06-10 RX ADMIN — METOPROLOL TARTRATE 2.5 MG: 5 INJECTION INTRAVENOUS at 23:24

## 2021-06-10 RX ADMIN — DEXTROSE AND SODIUM CHLORIDE 75 ML/HR: 5; 450 INJECTION, SOLUTION INTRAVENOUS at 15:27

## 2021-06-10 RX ADMIN — METOPROLOL TARTRATE 2.5 MG: 5 INJECTION INTRAVENOUS at 12:37

## 2021-06-10 RX ADMIN — KETOROLAC TROMETHAMINE 15 MG: 15 INJECTION, SOLUTION INTRAMUSCULAR; INTRAVENOUS at 19:05

## 2021-06-10 RX ADMIN — METOPROLOL TARTRATE 5 MG: 5 INJECTION INTRAVENOUS at 05:52

## 2021-06-10 RX ADMIN — KETOROLAC TROMETHAMINE 15 MG: 15 INJECTION, SOLUTION INTRAMUSCULAR; INTRAVENOUS at 11:30

## 2021-06-10 RX ADMIN — METOPROLOL TARTRATE 2.5 MG: 5 INJECTION INTRAVENOUS at 17:35

## 2021-06-10 RX ADMIN — METOPROLOL TARTRATE 5 MG: 5 INJECTION INTRAVENOUS at 00:20

## 2021-06-10 RX ADMIN — HYDRALAZINE HYDROCHLORIDE 10 MG: 20 INJECTION INTRAMUSCULAR; INTRAVENOUS at 08:26

## 2021-06-10 RX ADMIN — Medication 10 ML: at 05:52

## 2021-06-10 RX ADMIN — HYDRALAZINE HYDROCHLORIDE 10 MG: 20 INJECTION INTRAMUSCULAR; INTRAVENOUS at 19:05

## 2021-06-10 RX ADMIN — ENOXAPARIN SODIUM 30 MG: 40 INJECTION SUBCUTANEOUS at 08:27

## 2021-06-10 RX ADMIN — NYSTATIN 500000 UNITS: 100000 SUSPENSION ORAL at 08:26

## 2021-06-10 RX ADMIN — NYSTATIN 500000 UNITS: 100000 SUSPENSION ORAL at 20:23

## 2021-06-10 RX ADMIN — SODIUM CHLORIDE 40 MG: 9 INJECTION INTRAMUSCULAR; INTRAVENOUS; SUBCUTANEOUS at 08:27

## 2021-06-10 RX ADMIN — DEXTROSE AND SODIUM CHLORIDE 75 ML/HR: 5; 450 INJECTION, SOLUTION INTRAVENOUS at 00:28

## 2021-06-10 RX ADMIN — NYSTATIN 500000 UNITS: 100000 SUSPENSION ORAL at 17:35

## 2021-06-10 NOTE — PROGRESS NOTES
OCCUPATIONAL THERAPY TREATMENT  Patient: Priscilla Earl (5 y.o. female)  Date: 6/10/2021  Diagnosis: Dysphagia [R13.10]  Dysarthria [R47.1] <principal problem not specified>  Procedure(s) (LRB):  ESOPHAGOGASTRODUODENOSCOPY (EGD) (N/A) 2 Days Post-Op  Precautions:    Chart, occupational therapy assessment, plan of care, and goals were reviewed. ASSESSMENT  Patient continues with skilled OT services and is progressing towards goals. Upon MAYNARD arrival, pt semi supine in bed and agreeable to tx session. Pt completed bed mobility with supervision. Pt sat at EOB complaining of dizziness and that the room was spinning. Pt immediately returned to supine. BP checked in supine noted to be 193/101. Nursing notified of pt current position. BP taken again few minutes of laying supine and reading 151/76. Pt eyes noted to be watering and red. Pt had been stating something stuck in her throat, nursing notified and performed suction. Pt left semi supine in bed with call bell and phone within reach. Nursing stating they will be in soon to check on pt. Will continue to follow pt and progress towards OT goals. Recommending SNF at discharge. Current Level of Function Impacting Discharge (ADLs): supervision bed mobility, grooming supine setup A    Other factors to consider for discharge: patient lives alone, two recent falls         PLAN :  Patient continues to benefit from skilled intervention to address the above impairments. Continue treatment per established plan of care. to address goals.     Recommend next OT session: self care training, functional mobility training, therapeutic exercise, balance training, therapeutic activities, endurance activities, patient education, home safety training and family training/education    Recommendation for discharge: (in order for the patient to meet his/her long term goals)  Therapy up to 5 days/week in SNF setting    This discharge recommendation:  Has been made in collaboration with the attending provider and/or case management    IF patient discharges home will need the following DME: TBD       SUBJECTIVE:   Patient stated I am dizzy. It's spinning. I have something stuck in my throat.     OBJECTIVE DATA SUMMARY:   Cognitive/Behavioral Status:  Neurologic State: Alert  Orientation Level: Oriented X4  Cognition: Follows commands    Functional Mobility and Transfers for ADLs:  Bed Mobility:  Rolling: Supervision  Supine to Sit: Supervision  Sit to Supine: Supervision  Scooting: Supervision    Balance:  Sitting: Intact; With support    ADL Intervention:    Grooming  Position Performed: Long sitting on bed  Washing Face: Set-up  Brushing/Combing Hair: Set-up    Pain:  Headache after blood pressure spike    Activity Tolerance:   Fair and requires rest breaks  Please refer to the flowsheet for vital signs taken during this treatment. After treatment patient left in no apparent distress:   Supine in bed, Call bell within reach, and Bed / chair alarm activated    COMMUNICATION/COLLABORATION:   The patients plan of care was discussed with: Registered nurse.      CAESAR Montano  Time Calculation: 26 mins    Problem: Self Care Deficits Care Plan (Adult)  Goal: *Acute Goals and Plan of Care (Insert Text)  Description: Pt will be mod I sup <> sit in prep for EOB ADLs  Pt will be mod I grooming sitting EOB  Pt will be mod I LE dressing sitting EOB/long sit  Pt will be mod I sit <>  prep for toileting LRAD  Pt will be mod I toileting/toilet transfer/cloth mgmt LRAD  Pt will be IND following UE HEP in prep for self care tasks   Outcome: Progressing Towards Goal

## 2021-06-10 NOTE — PROGRESS NOTES
[facility]    Dysarthria, dysphagia    Visit Vitals  /72 (BP 1 Location: Left upper arm)   Pulse 86   Temp 97.9 °F (36.6 °C)   Resp 18   Ht 5' (1.524 m)   Wt 98 lb 1.7 oz (44.5 kg)   SpO2 96%   BMI 19.16 kg/m²       Comfortable  Slightly improved intelligible speech  Improved tongue range of motion to either side  Oral mucosa dry and white exudate remains    A/P  Oral dysphagia  Dysarthria  Oral thrush  - Plans for G tube tomorrow  - I do think thrush may have some component to her dysarthria   - Her tongue mobilty does appear improved today compared to a few days ago, though tongue elevation is poor  - Neurology work up ongoing - labs pending    MD [facility]  [place] [address] [facility]  Office Phone: [phone]

## 2021-06-10 NOTE — PROGRESS NOTES
Progress Note    Patient: Ramón Sanchez MRN: [id number]  SSN: [id number]    YOB: 1935  Age: 80 y.o.   Sex: female      Admit Date: 6/7/2021    LOS: 2 days     Subjective:   Patient examined, difficult swallow,   Family agree with PEG for long term care  Swallow study result noted,   Past Medical History:   Diagnosis Date    Arthritis     GERD (gastroesophageal reflux disease)     Hypertension     Other ill-defined conditions(799.89)     Diverticulitis    Psychiatric disorder     depression        Current Facility-Administered Medications:     ketorolac (TORADOL) injection 15 mg, 15 mg, IntraVENous, Q6H PRN, Conrad Correa, LIZZIE, 15 mg at 06/10/21 1130    metoprolol (LOPRESSOR) injection 2.5 mg, 2.5 mg, IntraVENous, Q6H, Conrad Correa NP    pantoprazole (PROTONIX) 40 mg in 0.9% sodium chloride 10 mL injection, 40 mg, IntraVENous, DAILY, Conrad Correa NP, 40 mg at 06/10/21 0827    bisacodyL (DULCOLAX) suppository 10 mg, 10 mg, Rectal, DAILY PRN, Conrad Correa NP    hydrOXYzine (VISTARIL) injection 25 mg, 25 mg, IntraMUSCular, QHS PRN, Conrad Correa NP, 25 mg at 06/09/21 2045    nystatin (MYCOSTATIN) 100,000 unit/mL oral suspension 500,000 Units, 500,000 Units, Oral, QID, Conrad Correa NP, 500,000 Units at 06/10/21 8987    dextrose 5 % - 0.45% NaCl infusion, 75 mL/hr, IntraVENous, CONTINUOUS, Conrad Correa NP, Last Rate: 75 mL/hr at 06/10/21 0028, 75 mL/hr at 06/10/21 0028    lidocaine (XYLOCAINE) 4 % (40 mg/mL) topical solution, , Topical, PRN, Jeanne Adams MD    [Held by provider] amLODIPine (NORVASC) tablet 5 mg, 5 mg, Oral, DAILY, Geoffrey Wilson MD    aspirin tablet 325 mg, 325 mg, Oral, DAILY, Geoffrey Bee MD    [Held by provider] cholecalciferol (VITAMIN D3) (1000 Units /25 mcg) tablet 1,000 Units, 1,000 Units, Oral, DAILY, David Wilson MD    hydrOXYzine pamoate (VISTARIL) capsule 25 mg, 25 mg, Oral, TID PRN, April Crum MD    [Held by provider] losartan (COZAAR) tablet 50 mg, 50 mg, Oral, DAILY, David Wilson MD    hydrALAZINE (APRESOLINE) 20 mg/mL injection 10 mg, 10 mg, IntraVENous, Q6H PRN, April Crum MD, 10 mg at 06/10/21 0826    sodium chloride (NS) flush 5-40 mL, 5-40 mL, IntraVENous, Q8H, David Wilson MD, 10 mL at 06/10/21 0552    sodium chloride (NS) flush 5-40 mL, 5-40 mL, IntraVENous, PRN, April Crum MD    acetaminophen (TYLENOL) tablet 650 mg, 650 mg, Oral, Q6H PRN **OR** acetaminophen (TYLENOL) suppository 650 mg, 650 mg, Rectal, Q6H PRN, April Crum MD    polyethylene glycol (MIRALAX) packet 17 g, 17 g, Oral, DAILY PRN, Cele Johnson MD    ondansetron (ZOFRAN ODT) tablet 4 mg, 4 mg, Oral, Q8H PRN **OR** ondansetron (ZOFRAN) injection 4 mg, 4 mg, IntraVENous, Q6H PRN, April Crum MD    [Held by provider] enoxaparin (LOVENOX) injection 30 mg, 30 mg, SubCUTAneous, DAILY, April Crum MD, 30 mg at 06/10/21 0827    Objective:     Vitals:    06/09/21 2351 06/10/21 0552 06/10/21 0757 06/10/21 1035   BP: (!) 148/85 (!) 165/86 (!) 186/92 (!) 117/52   Pulse: 95 96 74 78   Resp: 16 18 18 18   Temp: 97.8 °F (36.6 °C) 97.7 °F (36.5 °C) 97.9 °F (36.6 °C) 97.9 °F (36.6 °C)   SpO2: 98% 98% 97% 98%   Weight:       Height:            Intake and Output:  Current Shift: No intake/output data recorded. Last three shifts: 06/08 1901 - 06/10 0700  In: -   Out: 1975 [Urine:1975]    Physical Exam:   Physical Exam  HENT:      Head: Atraumatic. Mouth/Throat:      Mouth: Mucous membranes are dry. Eyes:      General: No scleral icterus. Pupils: Pupils are equal, round, and reactive to light. Cardiovascular:      Rate and Rhythm: Normal rate. Pulses: Normal pulses. Heart sounds: No murmur heard. No gallop. Abdominal:      General: Abdomen is flat.  There is no distension. Palpations: Abdomen is soft. Tenderness: There is no abdominal tenderness. There is no rebound. Musculoskeletal:         General: Normal range of motion. Cervical back: Normal range of motion. Skin:     General: Skin is warm. Neurological:      General: No focal deficit present. Psychiatric:         Mood and Affect: Mood normal.          Lab/Data Review:  No results found for this or any previous visit (from the past 24 hour(s)). MRI BRAIN W WO CONT   Final Result   1. No evidence of acute intracranial ischemia, mass or hemorrhage. 2.  Mild to moderate chronic small vessel ischemic changes. 3.  No abnormal parenchymal enhancement or suspicious cranial nerve enhancement   identified. Please note study was not optimized for evaluation of the posterior   fossa or cranial nerves. MRI BRAIN WO CONT   Final Result   1. No acute finding. 2. Mild changes of suspected small vessel ischemic disease with diffuse cerebral   atrophy. 3. 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 CT HEAD WO CONT   Final Result   1. No acute intracranial findings. 2. Atrophy and small vessel ischemic disease. Patient w/ poor bolus formation and control w/ significant anterior spillage and absent A-P transit. Patient maintain neutral neck or achieve hyperextension of neck. Assessment:     Active Problems:    Dysphagia (6/7/2021)      Dysarthria (6/8/2021)    Dysphagia   , patient unable to tolerate p.o. solids or liquids of note   unclear etiology  patient CT head/CT soft tissue of neck/MRI brain is negative,      General weakness, malnutrition,  Vitamin D deficiencycontinue home medications  Dehydration    EGD only showed hiatal hernia, dysmotility  Plan:   Wait MRI results  Nutrition speech to follow    PEG tube placement for nutrition support in the Am.   Signed By: Sybil Mortensen MD     Aide 10, 2021        Thank you for allowing me to participate in this patients care  Cc Referring Physician   John Porter MD

## 2021-06-10 NOTE — CONSULTS
Comprehensive Nutrition Assessment    Type and Reason for Visit: Consult (TF recs)     Nutrition Recommendations/Plan:   Initiate Jevity 1.5 at goal of 180ml, q5h      10AM, 2PM, 6PM, 10PM, 2AM  Flush with 160ml q5h or per MD      Goal provides 1350kcal (101%), 57g pro (106%), and 1500ml (100%)    D/C D5 at 75ml/hr (~300kcal/day) as TF provides 100% of EEN    Document TF formula, TF rate, flushes, and BMs in I/Os    Continue weekly measured wts    Nutrition Assessment:  Admitted for progressive dysphagia of solids and liquids, unable to tolerate PO. Per H&P, pt with several prior ED visits for similar reasoning. CT head/CT neck and MRI brain negative. SLP and GI consulted. EGD showing hiatal hernia, dysmotility. Currently NPO, SLP rec'd staying NPO with alternative means of nutrition. Awaiting family approval of PEG placement. RD to provide TF recs. Labs: Hgb 11.4, BUN 26, TP 8.4. Meds: D5 at 75ml/hr, enoxaparin, metoprolol, statin, PPI, IVF. Malnutrition Assessment:  Malnutrition Status:  Severe malnutrition    Context:  Acute illness     Findings of the 6 clinical characteristics of malnutrition:   Energy Intake:  7 - 50% or less of est energy requirements for 5 or more days  Weight Loss:  No significant weight loss     Body Fat Loss:  1 - Mild body fat loss, Orbital   Muscle Mass Loss:  7 - Moderate muscle mass loss (mild to temples), Temples (temporalis), Clavicles (pectoralis & deltoids), Scapula (trapezius)  Fluid Accumulation:  No significant fluid accumulation,      Estimated Daily Nutrient Needs:  Energy (kcal): 1340kcal (30kcal/kg); Weight Used for Energy Requirements: Current  Protein (g): 54g (1.2g/kg); Weight Used for Protein Requirements: Current  Fluid (ml/day): 1340mL; Method Used for Fluid Requirements: 1 ml/kcal      Nutrition Related Findings:  NFPE finding mild-mod UE wasting, LE intact. Hx of dysphagia noted. +Edentulism. Tongue protruding. Pt stated lips feel numb.  No n/v. Pt reports last BM over the weekend. No edema. Wounds:    None       Current Nutrition Therapies:  DIET NPO    Anthropometric Measures:  · Height:  5' (152.4 cm)  · Current Body Wt:  44.5 kg (98 lb 1.7 oz) (6/8 RD obtained)   · Admission Body Wt:  100 lb (stated 6/7)    · Usual Body Wt:  45.4 kg (100 lb) (pt reports wt stability)     · Ideal Body Wt:  100 lbs:  98.1 %   · Adjusted Body Weight:   ; Weight Adjustment for: No adjustment   · Adjusted BMI:       · BMI Category:  Underweight (BMI less than 22) age over 72       Nutrition Diagnosis:   · Biting/chewing (masticatory) difficulty related to partial or complete edentulism (dysphagia of unknown origin) as evidenced by NPO or clear liquid status due to medical condition, poor intake prior to admission (difficulty tolerating PO liquid and solids)      Nutrition Interventions:   Food and/or Nutrient Delivery:  (Enterial Nutrition/PEG)   Nutrition Education and Counseling: No recommendations at this time  Coordination of Nutrition Care: Continue to monitor while inpatient    Goals:  Initiate means of nutrition to meet >75% est needs, Wt gain vs stability within 0.5kg, Intakes >65% via PO vs NST       Nutrition Monitoring and Evaluation:   Behavioral-Environmental Outcomes: None identified  Food/Nutrient Intake Outcomes: Diet advancement/tolerance  Physical Signs/Symptoms Outcomes: Chewing or swallowing, Nutrition focused physical findings, GI status, Weight    Discharge Planning:     Too soon to determine     Electronically signed by Niurka Crump RD on 6/10/2021 at 9:46 AM    Contact: [phone]

## 2021-06-10 NOTE — PROGRESS NOTES
NEUROLOGY  PROGRESS NOTE    Admission History/Pertinent Events  Mor Suarez is a 80y.o. year old female who presented on 6/7/2021. Patient has a past medical history of HTN, Osteoarthritis, GERD, Diverticulitis, Anxiety/Depression who presented with difficulty swallowing. Per chart review, patient has had multiple visits to the ED for difficulty with her swallowing. Over the last few weeks prior to presentation patient reportedly has been having worsening difficulty with swallowing solids which has progressed to liquids as well. Patient unable to keep any foods down because of this. Emergent CT head was negative for any acute findings. In the ED, patient was found to have a leukocytosis of 12.9, slightly elevated creatinine of 1.06.      ASSESSMENT/PLAN      Impression  Will follow-up on myasthenia gravis panel and rest of laboratory workup and plan for outpatient NCS/EMG to evaluate for any evidence of motor neuron disease. [address]  [facility]     MRI Brain WO  [facility]  Remote left parieto-occipital hemosiderosis likely from remote [address]  [facility]    MRI Brain WWO  [facility]  No abnormal enhancement    Labs  Negative/WNL: TSH 0.74, RPR      Plan      Dysphagia, Dysarthria  Remote Left ParietoOccipital SAH   Cerebral Chronic Microvascular Ischemic Changes  Associated: Gastroesophageal Hiatal Hernia  -Neurovascular Prophylaxis:  (patient already on this)  -PT/OT/ST as needed  -Management of metabolic/infectious derangements to referring teams  -GI following  -Plan for outpatient NCS/EMG to evaluate for motor neuron disease  -Patient unable to participate in respiratory function testing  -F/U SPEP, UPEP, Free Light Chains, MG Panel        SUBJECTIVE   No significant changes on patient's neurological examination. Patient plan to get G-tube placed today per chart review.       Physical/Neurological Exam  Patient awake, alert; following central and peripheral commands   No expressive or receptive aphasia; moderate to severe dysarthria  Pupils react to light bilaterally; EOM Intact   No visual field deficits on gross exam   Intact to light touch on face bilaterally   No facial droop   Normal tongue movements noted  Motor: 3+/5 throughout  No abnormal movements   Sensation to light touch intact grossly throughout       OBJECTIVE  Vital Signs  Temp:  [97.6 °F (36.4 °C)-98.9 °F (37.2 °C)]   Pulse (Heart Rate):  []   BP: (104-194)/(45-97)   Resp Rate:  [16-24]   O2 Sat (%):  [96 %-99 %]   Weight:  [44.5 kg (98 lb 1.7 oz)-45.4 kg (100 lb)]     MEDICATIONS    Current Facility-Administered Medications:     metoprolol (LOPRESSOR) injection 5 mg, 5 mg, IntraVENous, Q6H, Conrad Correa NP, 5 mg at 06/10/21 8343    pantoprazole (PROTONIX) 40 mg in 0.9% sodium chloride 10 mL injection, 40 mg, IntraVENous, DAILY, Conrad Correa NP    bisacodyL (DULCOLAX) suppository 10 mg, 10 mg, Rectal, DAILY PRN, Conrad Correa NP    hydrOXYzine (VISTARIL) injection 25 mg, 25 mg, IntraMUSCular, QHS PRN, Conrad Correa NP, 25 mg at 06/09/21 2045    nystatin (MYCOSTATIN) 100,000 unit/mL oral suspension 500,000 Units, 500,000 Units, Oral, QID, Conrad Correa NP, 500,000 Units at 06/09/21 2049    dextrose 5 % - 0.45% NaCl infusion, 75 mL/hr, IntraVENous, CONTINUOUS, Conrad Correa NP, Last Rate: 75 mL/hr at 06/10/21 0028, 75 mL/hr at 06/10/21 0028    lidocaine (XYLOCAINE) 4 % (40 mg/mL) topical solution, , Topical, PRN, Rojelio Mccormack MD    [Held by provider] amLODIPine (NORVASC) tablet 5 mg, 5 mg, Oral, DAILY, Vidya Wilson MD    aspirin tablet 325 mg, 325 mg, Oral, DAILY, Vidya Hutchison MD    [Held by provider] cholecalciferol (VITAMIN D3) (1000 Units /25 mcg) tablet 1,000 Units, 1,000 Units, Oral, DAILY, Kardar, Ahmed Burnie Kehr, MD    hydrOXYzine pamoate (VISTARIL) capsule 25 mg, 25 mg, Oral, TID PRN, Shilpi Sutton MD    [Held by provider] losartan (COZAAR) tablet 50 mg, 50 mg, Oral, DAILY, David Wilson MD    hydrALAZINE (APRESOLINE) 20 mg/mL injection 10 mg, 10 mg, IntraVENous, Q6H PRN, Rigoberto Mercado MD, 10 mg at 06/08/21 2346    sodium chloride (NS) flush 5-40 mL, 5-40 mL, IntraVENous, Q8H, David Wilson MD, 10 mL at 06/10/21 0552    sodium chloride (NS) flush 5-40 mL, 5-40 mL, IntraVENous, PRN, David Wilson MD    acetaminophen (TYLENOL) tablet 650 mg, 650 mg, Oral, Q6H PRN **OR** acetaminophen (TYLENOL) suppository 650 mg, 650 mg, Rectal, Q6H PRN, David Wilson MD    polyethylene glycol (MIRALAX) packet 17 g, 17 g, Oral, DAILY PRN, David Wilson MD    ondansetron (ZOFRAN ODT) tablet 4 mg, 4 mg, Oral, Q8H PRN **OR** ondansetron (ZOFRAN) injection 4 mg, 4 mg, IntraVENous, Q6H PRN, David Wilson MD    enoxaparin (LOVENOX) injection 30 mg, 30 mg, SubCUTAneous, DAILY, Rigoberto Mercado MD      Labs: I've reviewed the labs for today     This document has been prepared by the Dragon voice recognition system, typographical errors may have occurred.  Attempts have been made to correct errors, however inadvertent errors may persist.

## 2021-06-10 NOTE — PROGRESS NOTES
Comprehensive Nutrition Assessment    Type and Reason for Visit: Consult, Reassess (TF recs)    Nutrition Recommendations/Plan:  Initiate Jevity 1.5 at goal of 180ml, q5h      10AM, 2PM, 6PM, 10PM, 2AM  Flush with 160ml q5h or per MD      Goal provides 1350kcal (101%), 57g pro (106%), and 1500ml (100%)    Document TF formula, TF volume, flushes, emesis, and BM in I/O's    Nutrition Assessment:  Admitted for progressive dysphagia of solids and liquids, unable to tolerate PO. Per H&P, pt with several prior ED visits for similar reasoning. CT head/CT neck and MRI brain negative. SLP and GI consulted. EGD showing hiatal hernia, dysmotility. Currently NPO, SLP rec'd staying NPO with alternative means of nutrition. Awaiting family approval of PEG placement. Per team likely PEG placement 6/11. RD to provide TF recs. Labs: Hgb 11.4, BUN 26, TP 8.4. Meds: D5 at 75ml/hr, enoxaparin, metoprolol, statin, PPI, IVF. Malnutrition Assessment:  Malnutrition Status:  Severe malnutrition    Context:  Acute illness     Findings of the 6 clinical characteristics of malnutrition:   Energy Intake:  7 - 50% or less of est energy requirements for 5 or more days  Weight Loss:  No significant weight loss     Body Fat Loss:  1 - Mild body fat loss, Orbital   Muscle Mass Loss:  7 - Moderate muscle mass loss (mild to temples), Temples (temporalis), Clavicles (pectoralis & deltoids), Scapula (trapezius)  Fluid Accumulation:  No significant fluid accumulation,      Estimated Daily Nutrient Needs:  Energy (kcal): 1340kcal (30kcal/kg); Weight Used for Energy Requirements: Current  Protein (g): 54g (1.2g/kg); Weight Used for Protein Requirements: Current  Fluid (ml/day): 1340mL; Method Used for Fluid Requirements: 1 ml/kcal    Nutrition Related Findings:  NFPE finding mild-mod UE wasting, LE intact. Hx of dysphagia noted. +Edentulism. Tongue protruding. Pt stated lips feel numb. No n/v. Pt reports last BM over the weekend. No edema.       Wounds: None       Current Nutrition Therapies:  DIET NPO  ADULT TUBE FEEDING PEG; Standard with Fiber; Delivery Method: Bolus; Bolus Frequency: 5 Times Daily; Bolus Volume (mL): 180; Bolus Method of Infusion: Gravity; Water Flush Volume (mL): 160; Water Flush Frequency: Other (Specify); Specify Other Elizabeth. .. Anthropometric Measures:  · Height:  5' (152.4 cm)  · Current Body Wt:  44.5 kg (98 lb 1.7 oz) (6/8 RD obtained)   · Admission Body Wt:  100 lb (stated 6/7)    · Usual Body Wt:  45.4 kg (100 lb) (pt reports wt stability)     · Ideal Body Wt:  100 lbs:  98.1 %   · BMI Category:  Underweight (BMI less than 22) age over 72       Nutrition Diagnosis:   · Biting/chewing (masticatory) difficulty related to partial or complete edentulism (dysphagia of unknown origin) as evidenced by NPO or clear liquid status due to medical condition, poor intake prior to admission (difficulty tolerating PO liquid and solids)    Nutrition Interventions:   Food and/or Nutrient Delivery:  (PO diet per GI/SLP vs G-tube)  Nutrition Education and Counseling: No recommendations at this time  Coordination of Nutrition Care: Continue to monitor while inpatient    Goals:  Initiate means of nutrition to meet >75% est needs, Wt gain vs stability within 0.5kg, Intakes >65% via PO vs NST       Nutrition Monitoring and Evaluation:   Behavioral-Environmental Outcomes: None identified  Food/Nutrient Intake Outcomes: Diet advancement/tolerance  Physical Signs/Symptoms Outcomes: Chewing or swallowing, Nutrition focused physical findings, GI status, Weight    Discharge Planning:     Too soon to determine     Electronically signed by Franki Salazar RD on 6/10/2021 at 12:24 PM    Contact: [phone]

## 2021-06-10 NOTE — PROGRESS NOTES
Hospitalist Progress Note    Subjective:   Daily Progress Note: 6/10/2021 11:07 AM    Hospital Course:  Pt admitted for complaints of dysphagia and dysarthria. Pt unable to swallow food or liquids for last several days, has lost significant weight. Presents with dysarthria, thickened speech and dysphagia. 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 Pt underwent an EGD , showing hiatal hernia at GE junction. Pt could not tolerate barium swallow, test was not done today, MRI with contrast did not show   Evidence of acute intracranial ischemia, mass or hemorrhage. There is mild to moderate chronic vessel ischemic changes. Pt and family decided on pursuing a PEG tube placement for nutritional support. Pt remains NPO, will schedule PEG placement for Friday 6/11/21. Subjective: Pt seen in room, complaints of headache, speech has somewhat improved from yesterday, NPO for PEG placement tomorrow.      Current Facility-Administered Medications   Medication Dose Route Frequency    ketorolac (TORADOL) injection 15 mg  15 mg IntraVENous Q6H PRN    metoprolol (LOPRESSOR) injection 2.5 mg  2.5 mg IntraVENous Q6H    pantoprazole (PROTONIX) 40 mg in 0.9% sodium chloride 10 mL injection  40 mg IntraVENous DAILY    bisacodyL (DULCOLAX) suppository 10 mg  10 mg Rectal DAILY PRN    hydrOXYzine (VISTARIL) injection 25 mg  25 mg IntraMUSCular QHS PRN    nystatin (MYCOSTATIN) 100,000 unit/mL oral suspension 500,000 Units  500,000 Units Oral QID    dextrose 5 % - 0.45% NaCl infusion  75 mL/hr IntraVENous CONTINUOUS    lidocaine (XYLOCAINE) 4 % (40 mg/mL) topical solution   Topical PRN    [Held by provider] amLODIPine (NORVASC) tablet 5 mg  5 mg Oral DAILY    aspirin tablet 325 mg  325 mg Oral DAILY    [Held by provider] cholecalciferol (VITAMIN D3) (1000 Units /25 mcg) tablet 1,000 Units  1,000 Units Oral DAILY    hydrOXYzine pamoate (VISTARIL) capsule 25 mg  25 mg Oral TID PRN    [Held by provider] losartan (COZAAR) tablet 50 mg  50 mg Oral DAILY    hydrALAZINE (APRESOLINE) 20 mg/mL injection 10 mg  10 mg IntraVENous Q6H PRN    sodium chloride (NS) flush 5-40 mL  5-40 mL IntraVENous Q8H    sodium chloride (NS) flush 5-40 mL  5-40 mL IntraVENous PRN    acetaminophen (TYLENOL) tablet 650 mg  650 mg Oral Q6H PRN    Or    acetaminophen (TYLENOL) suppository 650 mg  650 mg Rectal Q6H PRN    polyethylene glycol (MIRALAX) packet 17 g  17 g Oral DAILY PRN    ondansetron (ZOFRAN ODT) tablet 4 mg  4 mg Oral Q8H PRN    Or    ondansetron (ZOFRAN) injection 4 mg  4 mg IntraVENous Q6H PRN    [Held by provider] enoxaparin (LOVENOX) injection 30 mg  30 mg SubCUTAneous DAILY        Review of Systems:    Review of Systems   Constitutional: Positive for weight loss. Negative for chills and fever. Respiratory: Negative for cough, hemoptysis and shortness of breath. Cardiovascular: Negative for chest pain, palpitations and orthopnea. Gastrointestinal: Negative for abdominal pain, heartburn, nausea and vomiting. Genitourinary: Negative for dysuria and urgency. Neurological: Positive for dizziness and headaches. Objective:     Visit Vitals  BP (!) 117/52 (BP 1 Location: Right upper arm)   Pulse 78   Temp 97.9 °F (36.6 °C)   Resp 18   Ht 5' (1.524 m)   Wt 44.5 kg (98 lb 1.7 oz)   SpO2 98%   BMI 19.16 kg/m²      O2 Device: None (Room air)    Temp (24hrs), Av.9 °F (36.6 °C), Min:97.7 °F (36.5 °C), Max:98.3 °F (36.8 °C)      No intake/output data recorded.  1901 - 06/10 0700  In: -   Out:  [DZRUU:2540]    PHYSICAL EXAM:    Physical Exam  Constitutional:       General: She is not in acute distress. Comments: thin   HENT:      Mouth/Throat:      Mouth: Mucous membranes are dry. Cardiovascular:      Rate and Rhythm: Normal rate and regular rhythm. Heart sounds: Murmur heard.      Pulmonary:      Effort: Pulmonary effort is normal.      Breath sounds: Normal breath sounds. Abdominal:      General: Bowel sounds are normal.   Skin:     General: Skin is warm and dry. Neurological:      General: No focal deficit present. Mental Status: She is alert and oriented to person, place, and time. Data Review    No results found for this or any previous visit (from the past 24 hour(s)). MRI BRAIN W WO CONT   Final Result   1. No evidence of acute intracranial ischemia, mass or hemorrhage. 2.  Mild to moderate chronic small vessel ischemic changes. 3.  No abnormal parenchymal enhancement or suspicious cranial nerve enhancement   identified. Please note study was not optimized for evaluation of the posterior   fossa or cranial nerves. MRI BRAIN WO CONT   Final Result   1. No acute finding. 2. Mild changes of suspected small vessel ischemic disease with diffuse cerebral   atrophy. 3. 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 CT HEAD WO CONT   Final Result   1. No acute intracranial findings. 2. Atrophy and small vessel ischemic disease. Active Problems:    Dysphagia (6/7/2021)      Dysarthria (6/8/2021)        Assessment/Plan:   1. 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 Neurology following, MG panel, and RPR, SPEP, UPEP and FL chains are pending, will f/u with neurology after dc for EMG test     2. Hypertension- metoprolol IV q6hr, hold for parameters     3.  Hx of falls- OT/PT, OOB with assistance.     4. Severe protein malnutrition- secondary to dysphagia, follow TF recs per nutrition  Scheduled for PEG placement tomorrow.            DVT Prophylaxis: lovenox  Code Status:  DNR  POA: Crystal Favret [phone] [facility] [employer] discussed with:   _____patient, CM, staff nurse, GI__________________________________________________________    Elissa Delgado, NP

## 2021-06-11 ENCOUNTER — ANESTHESIA (OUTPATIENT)
Dept: ENDOSCOPY | Age: 86
DRG: 391 | End: 2021-06-11
Payer: MEDICARE

## 2021-06-11 LAB
ANION GAP SERPL CALC-SCNC: 6 MMOL/L (ref 5–15)
BUN SERPL-MCNC: 13 MG/DL (ref 6–20)
BUN/CREAT SERPL: 18 (ref 12–20)
CA-I BLD-MCNC: 9 MG/DL (ref 8.5–10.1)
CHLORIDE SERPL-SCNC: 107 MMOL/L (ref 97–108)
CO2 SERPL-SCNC: 24 MMOL/L (ref 21–32)
CREAT SERPL-MCNC: 0.73 MG/DL (ref 0.55–1.02)
ERYTHROCYTE [DISTWIDTH] IN BLOOD BY AUTOMATED COUNT: 13.5 % (ref 11.5–14.5)
GLUCOSE SERPL-MCNC: 117 MG/DL (ref 65–100)
HCT VFR BLD AUTO: 40.6 % (ref 35–47)
HGB BLD-MCNC: 13.4 G/DL (ref 11.5–16)
INR PPP: 1.1 (ref 0.9–1.1)
MCH RBC QN AUTO: 30 PG (ref 26–34)
MCHC RBC AUTO-ENTMCNC: 33 G/DL (ref 30–36.5)
MCV RBC AUTO: 91 FL (ref 80–99)
NRBC # BLD: 0 K/UL (ref 0–0.01)
NRBC BLD-RTO: 0 PER 100 WBC
PLATELET # BLD AUTO: 318 K/UL (ref 150–400)
PMV BLD AUTO: 9.9 FL (ref 8.9–12.9)
POTASSIUM SERPL-SCNC: 3.5 MMOL/L (ref 3.5–5.1)
PROTHROMBIN TIME: 14.2 SEC (ref 11.9–14.7)
RBC # BLD AUTO: 4.46 M/UL (ref 3.8–5.2)
SODIUM SERPL-SCNC: 137 MMOL/L (ref 136–145)
WBC # BLD AUTO: 11.4 K/UL (ref 3.6–11)

## 2021-06-11 PROCEDURE — 36415 COLL VENOUS BLD VENIPUNCTURE: CPT

## 2021-06-11 PROCEDURE — 65270000029 HC RM PRIVATE

## 2021-06-11 PROCEDURE — 76040000009: Performed by: INTERNAL MEDICINE

## 2021-06-11 PROCEDURE — 74011000250 HC RX REV CODE- 250: Performed by: INTERNAL MEDICINE

## 2021-06-11 PROCEDURE — 74011000250 HC RX REV CODE- 250: Performed by: NURSE PRACTITIONER

## 2021-06-11 PROCEDURE — 74011250636 HC RX REV CODE- 250/636: Performed by: NURSE PRACTITIONER

## 2021-06-11 PROCEDURE — 0DH68UZ INSERTION OF FEEDING DEVICE INTO STOMACH, VIA NATURAL OR ARTIFICIAL OPENING ENDOSCOPIC: ICD-10-PCS | Performed by: INTERNAL MEDICINE

## 2021-06-11 PROCEDURE — 85610 PROTHROMBIN TIME: CPT

## 2021-06-11 PROCEDURE — 77030016832 HC CATH GASTMY PEG1 BSC -C: Performed by: INTERNAL MEDICINE

## 2021-06-11 PROCEDURE — 80048 BASIC METABOLIC PNL TOTAL CA: CPT

## 2021-06-11 PROCEDURE — 74011250636 HC RX REV CODE- 250/636: Performed by: INTERNAL MEDICINE

## 2021-06-11 PROCEDURE — C9113 INJ PANTOPRAZOLE SODIUM, VIA: HCPCS | Performed by: NURSE PRACTITIONER

## 2021-06-11 PROCEDURE — 85027 COMPLETE CBC AUTOMATED: CPT

## 2021-06-11 PROCEDURE — 74011250637 HC RX REV CODE- 250/637: Performed by: NURSE PRACTITIONER

## 2021-06-11 RX ORDER — MIDAZOLAM HYDROCHLORIDE 1 MG/ML
INJECTION INTRAMUSCULAR; INTRAVENOUS AS NEEDED
Status: DISCONTINUED | OUTPATIENT
Start: 2021-06-11 | End: 2021-06-12

## 2021-06-11 RX ORDER — FENTANYL CITRATE 50 UG/ML
INJECTION, SOLUTION INTRAMUSCULAR; INTRAVENOUS AS NEEDED
Status: DISCONTINUED | OUTPATIENT
Start: 2021-06-11 | End: 2021-06-12

## 2021-06-11 RX ADMIN — SODIUM CHLORIDE 40 MG: 9 INJECTION INTRAMUSCULAR; INTRAVENOUS; SUBCUTANEOUS at 08:10

## 2021-06-11 RX ADMIN — METOPROLOL TARTRATE 2.5 MG: 5 INJECTION INTRAVENOUS at 17:54

## 2021-06-11 RX ADMIN — NYSTATIN 500000 UNITS: 100000 SUSPENSION ORAL at 17:54

## 2021-06-11 RX ADMIN — METOPROLOL TARTRATE 2.5 MG: 5 INJECTION INTRAVENOUS at 05:29

## 2021-06-11 RX ADMIN — Medication 10 ML: at 23:07

## 2021-06-11 RX ADMIN — Medication 10 ML: at 05:29

## 2021-06-11 RX ADMIN — HYDRALAZINE HYDROCHLORIDE 10 MG: 20 INJECTION INTRAMUSCULAR; INTRAVENOUS at 08:10

## 2021-06-11 RX ADMIN — NYSTATIN 500000 UNITS: 100000 SUSPENSION ORAL at 23:11

## 2021-06-11 RX ADMIN — KETOROLAC TROMETHAMINE 15 MG: 15 INJECTION, SOLUTION INTRAMUSCULAR; INTRAVENOUS at 08:11

## 2021-06-11 RX ADMIN — HYDROXYZINE HYDROCHLORIDE 25 MG: 50 INJECTION, SOLUTION INTRAMUSCULAR at 09:59

## 2021-06-11 RX ADMIN — NYSTATIN 500000 UNITS: 100000 SUSPENSION ORAL at 08:11

## 2021-06-11 NOTE — PROGRESS NOTES
Patient scheduled for PEG placement. Attempted introduction of lingual strengthening and OME, however patient reported she was tired and politely declined at this time. Patient continues w/ dysarthria and reduced lingual ROM. Patient able to elicit weak volitional swallow and reports her saliva is choking her. Will cont to follow. [facility] for treatment of dysphagia and perform MBS once able to participate.

## 2021-06-11 NOTE — PROGRESS NOTES
Hospitalist Progress Note    Subjective:   Daily Progress Note: 6/11/2021 11:09 AM    Hospital Course:  Pt admitted for complaints of dysphagia and dysarthria. Pt unable to swallow food or liquids for last several days, has lost significant weight. Presents with dysarthria, thickened speech and dysphagia. 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 Pt underwent an EGD , showing hiatal hernia at GE junction. Pt could not tolerate barium swallow, test was not done today, MRI with contrast did not show   Evidence of acute intracranial ischemia, mass or hemorrhage. There is mild to moderate chronic vessel ischemic changes.  Pt and family decided on pursuing a PEG tube placement for nutritional support. Pt remains NPO and plan for PEG placement. Subjective: Pt seen in room, has some anxiety, nervousness,   Scheduled for PEG placement today.     Current Facility-Administered Medications   Medication Dose Route Frequency    ketorolac (TORADOL) injection 15 mg  15 mg IntraVENous Q6H PRN    metoprolol (LOPRESSOR) injection 2.5 mg  2.5 mg IntraVENous Q6H    pantoprazole (PROTONIX) 40 mg in 0.9% sodium chloride 10 mL injection  40 mg IntraVENous DAILY    bisacodyL (DULCOLAX) suppository 10 mg  10 mg Rectal DAILY PRN    hydrOXYzine (VISTARIL) injection 25 mg  25 mg IntraMUSCular QHS PRN    nystatin (MYCOSTATIN) 100,000 unit/mL oral suspension 500,000 Units  500,000 Units Oral QID    dextrose 5 % - 0.45% NaCl infusion  75 mL/hr IntraVENous CONTINUOUS    lidocaine (XYLOCAINE) 4 % (40 mg/mL) topical solution   Topical PRN    [Held by provider] amLODIPine (NORVASC) tablet 5 mg  5 mg Oral DAILY    [Held by provider] aspirin tablet 325 mg  325 mg Oral DAILY    [Held by provider] cholecalciferol (VITAMIN D3) (1000 Units /25 mcg) tablet 1,000 Units  1,000 Units Oral DAILY    hydrOXYzine pamoate (VISTARIL) capsule 25 mg  25 mg Oral TID PRN    [Held by provider] losartan (COZAAR) tablet 50 mg  50 mg Oral DAILY    hydrALAZINE (APRESOLINE) 20 mg/mL injection 10 mg  10 mg IntraVENous Q6H PRN    sodium chloride (NS) flush 5-40 mL  5-40 mL IntraVENous Q8H    sodium chloride (NS) flush 5-40 mL  5-40 mL IntraVENous PRN    acetaminophen (TYLENOL) tablet 650 mg  650 mg Oral Q6H PRN    Or    acetaminophen (TYLENOL) suppository 650 mg  650 mg Rectal Q6H PRN    polyethylene glycol (MIRALAX) packet 17 g  17 g Oral DAILY PRN    ondansetron (ZOFRAN ODT) tablet 4 mg  4 mg Oral Q8H PRN    Or    ondansetron (ZOFRAN) injection 4 mg  4 mg IntraVENous Q6H PRN    [Held by provider] enoxaparin (LOVENOX) injection 30 mg  30 mg SubCUTAneous DAILY        Review of Systems:    Review of Systems   Constitutional: Positive for weight loss. Negative for chills and fever. HENT: Positive for congestion. Negative for sore throat. Respiratory: Negative for cough and sputum production. Cardiovascular: Negative for chest pain and palpitations. Gastrointestinal: Negative for heartburn, nausea and vomiting. Genitourinary: Negative for dysuria and urgency. Neurological: Positive for headaches. Negative for dizziness. Objective:     Visit Vitals  BP (!) 153/81   Pulse 93   Temp 97.3 °F (36.3 °C)   Resp 18   Ht 5' (1.524 m)   Wt 44.5 kg (98 lb 1.7 oz)   SpO2 99%   BMI 19.16 kg/m²      O2 Device: None (Room air)    Temp (24hrs), Av.5 °F (36.4 °C), Min:97.2 °F (36.2 °C), Max:97.9 °F (36.6 °C)      No intake/output data recorded.  1901 -  0700  In: -   Out: 1200 [Urine:1200]    PHYSICAL EXAM:    Physical Exam  Constitutional:       General: She is not in acute distress. Comments: thin   HENT:      Mouth/Throat:      Mouth: Mucous membranes are dry. Comments: Slightly thickened tongue  Cardiovascular:      Rate and Rhythm: Normal rate and regular rhythm. Heart sounds: Murmur heard.      Pulmonary:      Effort: Pulmonary effort is normal.      Breath sounds: Normal breath sounds. Musculoskeletal:         General: Normal range of motion. Skin:     General: Skin is warm and dry. Neurological:      Mental Status: She is alert and oriented to person, place, and time. Comments: Dysarthia, dysphagia   Psychiatric:      Comments: Appear anxious            Data Review    Recent Results (from the past 24 hour(s))   GLUCOSE, POC    Collection Time: 06/10/21  3:47 PM   Result Value Ref Range    Glucose (POC) 127 (H) 65 - 117 mg/dL    Performed by Samy Mckay (PCT)    CBC W/O DIFF    Collection Time: 06/11/21  6:18 AM   Result Value Ref Range    WBC 11.4 (H) 3.6 - 11.0 K/uL    RBC 4.46 3.80 - 5.20 M/uL    HGB 13.4 11.5 - 16.0 g/dL    HCT 40.6 35.0 - 47.0 %    MCV 91.0 80.0 - 99.0 FL    MCH 30.0 26.0 - 34.0 PG    MCHC 33.0 30.0 - 36.5 g/dL    RDW 13.5 11.5 - 14.5 %    PLATELET 488 303 - 154 K/uL    MPV 9.9 8.9 - 12.9 FL    NRBC 0.0 0.0  WBC    ABSOLUTE NRBC 0.00 0.00 - 3.04 K/uL   METABOLIC PANEL, BASIC    Collection Time: 06/11/21  6:18 AM   Result Value Ref Range    Sodium 137 136 - 145 mmol/L    Potassium 3.5 3.5 - 5.1 mmol/L    Chloride 107 97 - 108 mmol/L    CO2 24 21 - 32 mmol/L    Anion gap 6 5 - 15 mmol/L    Glucose 117 (H) 65 - 100 mg/dL    BUN 13 6 - 20 mg/dL    Creatinine 0.73 0.55 - 1.02 mg/dL    BUN/Creatinine ratio 18 12 - 20      GFR est AA >60 >60 ml/min/1.73m2    GFR est non-AA >60 >60 ml/min/1.73m2    Calcium 9.0 8.5 - 10.1 mg/dL   PROTHROMBIN TIME + INR    Collection Time: 06/11/21  6:18 AM   Result Value Ref Range    Prothrombin time 14.2 11.9 - 14.7 sec    INR 1.1 0.9 - 1.1         MRI BRAIN W WO CONT   Final Result   1. No evidence of acute intracranial ischemia, mass or hemorrhage. 2.  Mild to moderate chronic small vessel ischemic changes. 3.  No abnormal parenchymal enhancement or suspicious cranial nerve enhancement   identified. Please note study was not optimized for evaluation of the posterior   fossa or cranial nerves. MRI BRAIN WO CONT   Final Result   1. No acute finding. 2. Mild changes of suspected small vessel ischemic disease with diffuse cerebral   atrophy. 3. 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 CT HEAD WO CONT   Final Result   1. No acute intracranial findings. 2. Atrophy and small vessel ischemic disease. Active Problems:    Dysphagia (6/7/2021)      Dysarthria (6/8/2021)        Assessment/Plan:     1. 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 Neurology following, MG panel, and RPR, SPEP, UPEP and FL chains are pending, will f/u with neurology after dc for EMG test     2. Hypertension- metoprolol IV q6hr, hold for parameters, will restart medications per PEG tube     3. Hx of falls- OT/PT, OOB with assistance.     4. Severe protein malnutrition- secondary to dysphagia, follow TF recs per nutrition  Scheduled for PEG placement today. 5. Discharge plan- SNF when medically stable, CM following.       DVT Prophylaxis: lovenox  Code Status:  DNR  POA: Crystal Favret [phone] [facility] discussed with:   ___patient, staff nurse, ABNER____________________________________________________________    Gene Coad, NP

## 2021-06-11 NOTE — PROGRESS NOTES
Patient pending medical readiness for discharge. Patient has been accepted for rehab at [facility] when medically stable.

## 2021-06-11 NOTE — PROGRESS NOTES
Spiritual Care Assessment/Progress Note  [facility]      NAME: Maliha Banegas      MRN: [id number]  AGE: 80 y.o.  SEX: female  Oriental orthodox Affiliation: Non Alevism   Language: English     6/11/2021     Total Time (in minutes): 20     Spiritual Assessment begun in [facility] through conversation with:         [x]Patient        [] Family    [] Friend(s)        Reason for Consult: Request by staff     Spiritual beliefs: (Please include comment if needed)     [] Identifies with a carolina tradition:  Non Adventist        [] Supported by a carolina community:            [] Claims no spiritual orientation:           [] Seeking spiritual identity:                [] Adheres to an individual form of spirituality:           [] Not able to assess:                           Identified resources for coping:      [x] Prayer                               [] Music                  [] Guided Imagery     [] Family/friends                 [] Pet visits     [] Devotional reading                         [] Unknown     [] Other:                                            Interventions offered during this visit: (See comments for more details)    Patient Interventions: Prayer (actual), Affirmation of emotions/emotional suffering, Coping skills reviewed/reinforced           Plan of Care:     [] Support spiritual and/or cultural needs    [] Support AMD and/or advance care planning process      [] Support grieving process   [] Coordinate Rites and/or Rituals    [] Coordination with community clergy   [] No spiritual needs identified at this time   [] Detailed Plan of Care below (See Comments)  [] Make referral to Music Therapy  [] Make referral to Pet Therapy     [] Make referral to Addiction services  [] Make referral to [facility]  [] Make referral to Spiritual Care Partner  [] No future visits requested        [x] Follow up upon further referrals     Comments:  responded to staff referral for visit to patient in 56 following assessment that patient is tearful and concerned regarding pending surgical placement of feeding tube today.  and patient engaged in conversation regarding feelings of concern, uncertainty. Patient positioned her hands in a praying fashion and requested prayer.  provided prayer seeking God's presence and assurance of being aware of patient's needs. Through tears patient expressed thankfulness for visit. Patient indicated her granddaughter will be present today for her support.  advised patient and staff of chaplains availability for follow up upon further referrals.      Visited by Becky Barton, [facility]    can be contacted by calling  and requesting  on call

## 2021-06-11 NOTE — ANESTHESIA PREPROCEDURE EVALUATION
Relevant Problems   No relevant active problems       Anesthetic History               Review of Systems / Medical History      Pulmonary                   Neuro/Psych         Psychiatric history     Cardiovascular    Hypertension                   GI/Hepatic/Renal     GERD           Endo/Other        Arthritis     Other Findings              Physical Exam    Airway  Mallampati: III      Mouth opening: Normal     Cardiovascular  Regular rate and rhythm,  S1 and S2 normal,  no murmur, click, rub, or gallop             Dental    Dentition: Full upper dentures and Full lower dentures     Pulmonary  Breath sounds clear to auscultation               Abdominal  GI exam deferred       Other Findings            Anesthetic Plan    ASA: 3  Anesthesia type: general and MAC          Induction: Intravenous  Anesthetic plan and risks discussed with: Patient

## 2021-06-11 NOTE — PERIOP NOTES
Report called to primary nurse Khadijah Schaefer. Patient awake, VSS, no distress noted, patient states no pain.    Patient to go back to room 561

## 2021-06-11 NOTE — PROGRESS NOTES
NEUROLOGY  PROGRESS NOTE    Admission History/Pertinent Events  Chavez Gutiérrez is a 80y.o. year old female who presented on 6/7/2021. Patient has a past medical history of HTN, Osteoarthritis, GERD, Diverticulitis, Anxiety/Depression who presented with difficulty swallowing. Per chart review, patient has had multiple visits to the ED for difficulty with her swallowing. Over the last few weeks prior to presentation patient reportedly has been having worsening difficulty with swallowing solids which has progressed to liquids as well. Patient unable to keep any foods down because of this. Emergent CT head was negative for any acute findings. In the ED, patient was found to have a leukocytosis of 12.9, slightly elevated creatinine of 1.06.      ASSESSMENT/PLAN      Impression  Will follow-up on myasthenia gravis panel and rest of laboratory workup and plan for outpatient NCS/EMG to evaluate for any evidence of motor neuron disease. [address]  [facility]  [facility]     MRI Brain WO  [facility]  Remote left parieto-occipital hemosiderosis likely from remote [address]  [facility]  [facility]    MRI Brain WWO  [facility]  No abnormal enhancement    Labs  Negative/WNL: TSH 0.74, RPR      Plan      Dysphagia, Dysarthria  Remote Left ParietoOccipital SAH   Cerebral Chronic Microvascular Ischemic Changes  Associated: Gastroesophageal Hiatal Hernia  -Neurovascular Prophylaxis:  (patient already on this)  -PT/OT/ST as needed  -Management of metabolic/infectious derangements to referring teams  -GI following  -Plan for outpatient NCS/EMG to evaluate for motor neuron disease  -Patient unable to participate in respiratory function testing  -F/U SPEP, UPEP, Free Light Chains, MG Panel    Patient to follow-up with neurology in 2 weeks from discharge    Please contact neurology with any further questions/concerns      SUBJECTIVE   Patient seems to have better ability to speak with improved dysarthria this morning.   Tongue weakness continues to improve.     Physical/Neurological Exam  Patient awake, alert; following central and peripheral commands   No expressive or receptive aphasia; moderate to severe dysarthria  Pupils react to light bilaterally; EOM Intact   No visual field deficits on gross exam   Intact to light touch on face bilaterally   No facial droop   Normal tongue movements noted  Motor: 3+/5 throughout  No abnormal movements   Sensation to light touch intact grossly throughout       OBJECTIVE  Vital Signs  Temp:  [97.2 °F (36.2 °C)-98.3 °F (36.8 °C)]   Pulse (Heart Rate):  []   BP: (104-192)/()   Resp Rate:  [16-24]   O2 Sat (%):  [96 %-99 %]   Weight:  [44.5 kg (98 lb 1.7 oz)]     MEDICATIONS    Current Facility-Administered Medications:     ketorolac (TORADOL) injection 15 mg, 15 mg, IntraVENous, Q6H PRN, Conrad Correa NP, 15 mg at 06/11/21 0811    metoprolol (LOPRESSOR) injection 2.5 mg, 2.5 mg, IntraVENous, Q6H, Conrad Correa NP, 2.5 mg at 06/11/21 0529    pantoprazole (PROTONIX) 40 mg in 0.9% sodium chloride 10 mL injection, 40 mg, IntraVENous, DAILY, Conrad Correa NP, 40 mg at 06/11/21 0810    bisacodyL (DULCOLAX) suppository 10 mg, 10 mg, Rectal, DAILY PRN, Conrad Correa NP    hydrOXYzine (VISTARIL) injection 25 mg, 25 mg, IntraMUSCular, QHS PRN, Conrad Correa NP, 25 mg at 06/09/21 2045    nystatin (MYCOSTATIN) 100,000 unit/mL oral suspension 500,000 Units, 500,000 Units, Oral, QID, Conrad Correa NP, 500,000 Units at 06/11/21 4288    dextrose 5 % - 0.45% NaCl infusion, 75 mL/hr, IntraVENous, CONTINUOUS, Conrad Correa NP, Last Rate: 75 mL/hr at 06/10/21 1527, 75 mL/hr at 06/10/21 1527    lidocaine (XYLOCAINE) 4 % (40 mg/mL) topical solution, , Topical, PRN, Sonja Mancuso MD    [Held by provider] amLODIPine (NORVASC) tablet 5 mg, 5 mg, Oral, DAILY, Avi Wilsno MD    [Held by provider] aspirin tablet 325 mg, 325 mg, Oral, DAILY, Avi Wilson MD    [Held by provider] cholecalciferol (VITAMIN D3) (1000 Units /25 mcg) tablet 1,000 Units, 1,000 Units, Oral, DAILY, David Wilson MD    hydrOXYzine pamoate (VISTARIL) capsule 25 mg, 25 mg, Oral, TID PRN, Levi Ortega MD    [Held by provider] losartan (COZAAR) tablet 50 mg, 50 mg, Oral, DAILY, David Wilson MD    hydrALAZINE (APRESOLINE) 20 mg/mL injection 10 mg, 10 mg, IntraVENous, Q6H PRN, Levi Ortega MD, 10 mg at 06/11/21 0810    sodium chloride (NS) flush 5-40 mL, 5-40 mL, IntraVENous, Q8H, Avi Wilson MD, 10 mL at 06/11/21 0529    sodium chloride (NS) flush 5-40 mL, 5-40 mL, IntraVENous, PRN, Levi Ortega MD    acetaminophen (TYLENOL) tablet 650 mg, 650 mg, Oral, Q6H PRN **OR** acetaminophen (TYLENOL) suppository 650 mg, 650 mg, Rectal, Q6H PRN, David Wilson MD    polyethylene glycol (MIRALAX) packet 17 g, 17 g, Oral, DAILY PRN, David Wilson MD    ondansetron (ZOFRAN ODT) tablet 4 mg, 4 mg, Oral, Q8H PRN **OR** ondansetron (ZOFRAN) injection 4 mg, 4 mg, IntraVENous, Q6H PRN, Levi Ortega MD    [Held by provider] enoxaparin (LOVENOX) injection 30 mg, 30 mg, SubCUTAneous, DAILY, Levi Ortega MD, 30 mg at 06/10/21 0827      Labs: I've reviewed the labs for today     This document has been prepared by the Dragon voice recognition system, typographical errors may have occurred.  Attempts have been made to correct errors, however inadvertent errors may persist.

## 2021-06-12 LAB
GLUCOSE BLD STRIP.AUTO-MCNC: 135 MG/DL (ref 65–117)
GLUCOSE BLD STRIP.AUTO-MCNC: 200 MG/DL (ref 65–117)
PERFORMED BY, TECHID: ABNORMAL
PERFORMED BY, TECHID: ABNORMAL

## 2021-06-12 PROCEDURE — 74011250637 HC RX REV CODE- 250/637: Performed by: INTERNAL MEDICINE

## 2021-06-12 PROCEDURE — 74011000250 HC RX REV CODE- 250: Performed by: NURSE PRACTITIONER

## 2021-06-12 PROCEDURE — 82962 GLUCOSE BLOOD TEST: CPT

## 2021-06-12 PROCEDURE — 74011250636 HC RX REV CODE- 250/636: Performed by: INTERNAL MEDICINE

## 2021-06-12 PROCEDURE — 74011250637 HC RX REV CODE- 250/637: Performed by: NURSE PRACTITIONER

## 2021-06-12 PROCEDURE — 65270000029 HC RM PRIVATE

## 2021-06-12 RX ORDER — AMLODIPINE BESYLATE 5 MG/1
10 TABLET ORAL DAILY
Status: DISCONTINUED | OUTPATIENT
Start: 2021-06-13 | End: 2021-06-15 | Stop reason: HOSPADM

## 2021-06-12 RX ORDER — LOSARTAN POTASSIUM 50 MG/1
100 TABLET ORAL DAILY
Status: DISCONTINUED | OUTPATIENT
Start: 2021-06-13 | End: 2021-06-15 | Stop reason: HOSPADM

## 2021-06-12 RX ORDER — FAMOTIDINE 20 MG/1
40 TABLET, FILM COATED ORAL DAILY
Status: DISCONTINUED | OUTPATIENT
Start: 2021-06-12 | End: 2021-06-15 | Stop reason: HOSPADM

## 2021-06-12 RX ADMIN — NYSTATIN 500000 UNITS: 100000 SUSPENSION ORAL at 20:13

## 2021-06-12 RX ADMIN — Medication 10 ML: at 21:04

## 2021-06-12 RX ADMIN — METOPROLOL TARTRATE 2.5 MG: 5 INJECTION INTRAVENOUS at 05:44

## 2021-06-12 RX ADMIN — AMLODIPINE BESYLATE 5 MG: 5 TABLET ORAL at 09:15

## 2021-06-12 RX ADMIN — ACETAMINOPHEN 650 MG: 325 TABLET ORAL at 20:13

## 2021-06-12 RX ADMIN — FAMOTIDINE 40 MG: 20 TABLET ORAL at 09:15

## 2021-06-12 RX ADMIN — NYSTATIN 500000 UNITS: 100000 SUSPENSION ORAL at 13:45

## 2021-06-12 RX ADMIN — Medication 10 ML: at 13:46

## 2021-06-12 RX ADMIN — ENOXAPARIN SODIUM 30 MG: 40 INJECTION SUBCUTANEOUS at 09:15

## 2021-06-12 RX ADMIN — Medication 10 ML: at 05:45

## 2021-06-12 RX ADMIN — LOSARTAN POTASSIUM 50 MG: 50 TABLET, FILM COATED ORAL at 09:15

## 2021-06-12 RX ADMIN — HYDRALAZINE HYDROCHLORIDE 10 MG: 20 INJECTION INTRAMUSCULAR; INTRAVENOUS at 15:10

## 2021-06-12 RX ADMIN — NYSTATIN 500000 UNITS: 100000 SUSPENSION ORAL at 09:14

## 2021-06-12 RX ADMIN — Medication 1000 UNITS: at 09:15

## 2021-06-12 RX ADMIN — ASPIRIN 325 MG ORAL TABLET 325 MG: 325 PILL ORAL at 09:15

## 2021-06-12 NOTE — PROGRESS NOTES
Problem: Falls - Risk of  Goal: *Absence of Falls  Description: Document Jann Li Fall Risk and appropriate interventions in the flowsheet.   Outcome: Progressing Towards Goal  Note: Fall Risk Interventions:                 Elimination Interventions: Call light in reach    History of Falls Interventions: Bed/chair exit alarm

## 2021-06-13 LAB
GLUCOSE BLD STRIP.AUTO-MCNC: 119 MG/DL (ref 65–117)
GLUCOSE BLD STRIP.AUTO-MCNC: 123 MG/DL (ref 65–117)
GLUCOSE BLD STRIP.AUTO-MCNC: 155 MG/DL (ref 65–117)
GLUCOSE BLD STRIP.AUTO-MCNC: 161 MG/DL (ref 65–117)
PERFORMED BY, TECHID: ABNORMAL

## 2021-06-13 PROCEDURE — 82962 GLUCOSE BLOOD TEST: CPT

## 2021-06-13 PROCEDURE — 97530 THERAPEUTIC ACTIVITIES: CPT

## 2021-06-13 PROCEDURE — 65270000029 HC RM PRIVATE

## 2021-06-13 PROCEDURE — 74011250636 HC RX REV CODE- 250/636: Performed by: INTERNAL MEDICINE

## 2021-06-13 PROCEDURE — 74011250637 HC RX REV CODE- 250/637: Performed by: NURSE PRACTITIONER

## 2021-06-13 PROCEDURE — 74011250637 HC RX REV CODE- 250/637: Performed by: HOSPITALIST

## 2021-06-13 PROCEDURE — 74011250637 HC RX REV CODE- 250/637: Performed by: INTERNAL MEDICINE

## 2021-06-13 RX ORDER — TRAZODONE HYDROCHLORIDE 50 MG/1
100 TABLET ORAL
Status: DISCONTINUED | OUTPATIENT
Start: 2021-06-13 | End: 2021-06-15 | Stop reason: HOSPADM

## 2021-06-13 RX ADMIN — NYSTATIN 500000 UNITS: 100000 SUSPENSION ORAL at 08:04

## 2021-06-13 RX ADMIN — ASPIRIN 325 MG ORAL TABLET 325 MG: 325 PILL ORAL at 08:04

## 2021-06-13 RX ADMIN — Medication 10 ML: at 05:03

## 2021-06-13 RX ADMIN — ENOXAPARIN SODIUM 30 MG: 40 INJECTION SUBCUTANEOUS at 08:05

## 2021-06-13 RX ADMIN — AMLODIPINE BESYLATE 10 MG: 5 TABLET ORAL at 08:04

## 2021-06-13 RX ADMIN — HYDROXYZINE PAMOATE 25 MG: 25 CAPSULE ORAL at 01:07

## 2021-06-13 RX ADMIN — Medication 1000 UNITS: at 08:05

## 2021-06-13 RX ADMIN — LOSARTAN POTASSIUM 100 MG: 50 TABLET, FILM COATED ORAL at 08:04

## 2021-06-13 RX ADMIN — TRAZODONE HYDROCHLORIDE 100 MG: 50 TABLET ORAL at 22:15

## 2021-06-13 RX ADMIN — FAMOTIDINE 40 MG: 20 TABLET ORAL at 08:04

## 2021-06-13 NOTE — PROGRESS NOTES
Problem: Mobility Impaired (Adult and Pediatric)  Goal: *Acute Goals and Plan of Care (Insert Text)  Description: Pt will be I with LE HEP in 7 days. Pt will perform bed mobility with mod I in 7 days. Pt will perform transfers with mod I in 7 days. Pt will amb  feet with LRAD safely with mod I in 7 days. Outcome: Progressing Towards Goal   PHYSICAL THERAPY TREATMENT  Patient: Alvin Goodwin (49 y.o. female)  Date: 6/13/2021  Diagnosis: Dysphagia [R13.10]  Dysarthria [R47.1] <principal problem not specified>  Procedure(s) (LRB):  PERCUTANEOUS ENDOSCOPIC GASTROSTOMY TUBE INSERTION (N/A) 2 Days Post-Op  Precautions:    Chart, physical therapy assessment, plan of care and goals were reviewed. ASSESSMENT  Patient continues with skilled PT services and is progressing towards goals. Patient agreeable to work with PT today but reports having a headache (her only pain today). Supervision for bed mobility to come to sitting EOB and patient sat up for ~10 minutes and attempted to perform LE therex but needing constant breaks and keeping her head hung due to headache symptoms. She reports a little dizziness that did not subside with prolonged sitting or therex. She declined ambulation on today's date and asked to return to supine. Patient positioned in semi-supine and agreeable to perform LE therex in this position. She was \"exhausted\" after performing exercises and unable to scoot to [facility] so modA to position patient to [facility] and to comfort. She will benefit from continued skilled PT services to improve her strength for transfers and bed mobility and for practice with gait training to return to [facility]. Current Level of Function Impacting Discharge (mobility/balance): decr mobility, generalized weakness/debility     Other factors to consider for discharge: age, underweight, HA on today's date          PLAN :  Patient continues to benefit from skilled intervention to address the above impairments.   Continue treatment per established plan of care. to address goals. Recommendation for discharge: (in order for the patient to meet his/her long term goals)  Physical therapy at least 2 days/week in the home AND ensure assist and/or supervision for safety with ADLS, IADLs 51/6 care     This discharge recommendation:  Has been made in collaboration with the attending provider and/or case management    IF patient discharges home will need the following DME: rolling walker and to be determined (TBD)       SUBJECTIVE:   Patient stated ain't no way I can do 20 reps, you have to be kidding. I am exhausted.     OBJECTIVE DATA SUMMARY:   Critical Behavior:  Neurologic State: Alert  Orientation Level: Oriented X4  Cognition: Decreased command following, Appropriate decision making  Safety/Judgement: Awareness of environment  Functional Mobility Training:  Bed Mobility:  Rolling: Supervision  Supine to Sit: Supervision  Sit to Supine: Supervision  Scooting: Moderate assistance        Transfers:     Declined due to bad HA on today's date      Balance:  Sitting: Intact; With support  Ambulation/Gait Training:     Declined due to bad HA on today's date     Therapeutic Exercises:   2 x 10 bilaterally (in sitting) LAQ, (in semi-supine): SLR, knee flexion, hip abd/add, ankle pumps, bicep curls, shoulder flexion  Frequent rest breaks throughout due to exhaustion and pain with HA  Pain Ratin/10, headache     Activity Tolerance:   Fair, requires frequent rest breaks, and observed SOB with activity  Please refer to the flowsheet for vital signs taken during this treatment. After treatment patient left in no apparent distress:   Supine in bed, Call bell within reach, Bed / chair alarm activated, and Side rails x 3. Instructed patient to call for nursing when needing to get out of bed.  Clyde re-cristino at end of session     COMMUNICATION/COLLABORATION:   The patients plan of care was discussed with: Physical therapist.     Aline Naranjo Dintaman   Time Calculation: 25 mins

## 2021-06-13 NOTE — PROGRESS NOTES
Hospitalist Progress Note    Subjective:   Daily Progress Note: 6/13/2021 3:38 PM    Hospital Course:  Pt admitted for complaints of dysphagia and dysarthria. Pt unable to swallow food or liquids for last several days, has lost significant weight. Presents with dysarthria, thickened speech and dysphagia. 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 Pt underwent an EGD , showing hiatal hernia at GE junction. Pt could not tolerate barium swallow, test was not done today, MRI with contrast did not show   Evidence of acute intracranial ischemia, mass or hemorrhage. There is mild to moderate chronic vessel ischemic changes.  Pt and family decided on pursuing a PEG tube placement for nutritional support.     Subjective: Pt seen in room, no complaints overnight, pt has less dysarthria , speech is much clearer today.      Current Facility-Administered Medications   Medication Dose Route Frequency    famotidine (PEPCID) tablet 40 mg  40 mg Per G Tube DAILY    amLODIPine (NORVASC) tablet 10 mg  10 mg Oral DAILY    losartan (COZAAR) tablet 100 mg  100 mg Oral DAILY    bisacodyL (DULCOLAX) suppository 10 mg  10 mg Rectal DAILY PRN    aspirin tablet 325 mg  325 mg Oral DAILY    cholecalciferol (VITAMIN D3) (1000 Units /25 mcg) tablet 1,000 Units  1,000 Units Oral DAILY    hydrOXYzine pamoate (VISTARIL) capsule 25 mg  25 mg Oral TID PRN    hydrALAZINE (APRESOLINE) 20 mg/mL injection 10 mg  10 mg IntraVENous Q6H PRN    sodium chloride (NS) flush 5-40 mL  5-40 mL IntraVENous Q8H    sodium chloride (NS) flush 5-40 mL  5-40 mL IntraVENous PRN    acetaminophen (TYLENOL) tablet 650 mg  650 mg Oral Q6H PRN    Or    acetaminophen (TYLENOL) suppository 650 mg  650 mg Rectal Q6H PRN    polyethylene glycol (MIRALAX) packet 17 g  17 g Oral DAILY PRN    ondansetron (ZOFRAN ODT) tablet 4 mg  4 mg Oral Q8H PRN    Or    ondansetron (ZOFRAN) injection 4 mg  4 mg IntraVENous Q6H PRN    enoxaparin (LOVENOX) injection 30 mg  30 mg SubCUTAneous DAILY        Review of Systems:    Review of Systems   Constitutional: Negative for chills and fever. HENT: Negative for congestion and sore throat. Respiratory: Negative for cough and shortness of breath. Cardiovascular: Negative for chest pain and palpitations. Gastrointestinal: Negative for heartburn and nausea. Genitourinary: Negative for dysuria and urgency. Neurological: Negative for dizziness and headaches. Objective:     Visit Vitals  BP (!) 161/79   Pulse (!) 101   Temp 98.4 °F (36.9 °C)   Resp 16   Ht 5' (1.524 m)   Wt 44.5 kg (98 lb 1.7 oz)   SpO2 99%   BMI 19.16 kg/m²    O2 Flow Rate (L/min): 10 l/min O2 Device: None (Room air)    Temp (24hrs), Av.2 °F (36.8 °C), Min:97.8 °F (36.6 °C), Max:98.5 °F (36.9 °C)      No intake/output data recorded. No intake/output data recorded. PHYSICAL EXAM:    Physical Exam  Constitutional:       General: She is not in acute distress. HENT:      Head: Normocephalic and atraumatic. Cardiovascular:      Rate and Rhythm: Normal rate and regular rhythm. Pulses: Normal pulses. Heart sounds: Murmur heard. Pulmonary:      Effort: Pulmonary effort is normal.      Breath sounds: Normal breath sounds. Abdominal:      General: Bowel sounds are normal.      Palpations: Abdomen is soft. Comments: PEG tube intact   Musculoskeletal:         General: Normal range of motion. Skin:     General: Skin is warm and dry. Neurological:      General: No focal deficit present. Mental Status: She is alert and oriented to person, place, and time.       Comments: Some dysarthria   Psychiatric:         Mood and Affect: Mood normal.         Behavior: Behavior normal.            Data Review    Recent Results (from the past 24 hour(s))   GLUCOSE, POC    Collection Time: 21  5:28 AM   Result Value Ref Range    Glucose (POC) 155 (H) 65 - 117 mg/dL    Performed by [facility] KEEGAN    GLUCOSE, POC    Collection Time: 06/13/21  8:11 AM   Result Value Ref Range    Glucose (POC) 119 (H) 65 - 117 mg/dL    Performed by Kendall Messina        MRI BRAIN W WO CONT   Final Result   1. No evidence of acute intracranial ischemia, mass or hemorrhage. 2.  Mild to moderate chronic small vessel ischemic changes. 3.  No abnormal parenchymal enhancement or suspicious cranial nerve enhancement   identified. Please note study was not optimized for evaluation of the posterior   fossa or cranial nerves. MRI BRAIN WO CONT   Final Result   1. No acute finding. 2. Mild changes of suspected small vessel ischemic disease with diffuse cerebral   atrophy. 3. 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 CT HEAD WO CONT   Final Result   1. No acute intracranial findings. 2. Atrophy and small vessel ischemic disease. Active Problems:    Dysphagia (6/7/2021)      Dysarthria (6/8/2021)        Assessment/Plan:        1. 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 Neurology following, MG panel, and RPR, SPEP, UPEP and FL chains are pending, will f/u with neurology after dc for EMG test  Dysarthria improving, less difficulties with forming words. Speech following, will need outpatient f/u with speech therapy.      2. Hypertension-  medications per PEG tube.      3. Hx of falls- OT/PT, OOB with assistance.     4. Severe protein malnutrition- secondary to dysphagia,  PEG tube, nutrition following,  Bolus feeds, tolerating well, no residual.      5. Discharge plan- SNF accepted, planned for tomorrow if remains clinically stable.        DVT Prophylaxis: lovenox  Code Status:  DNR  POA: Crystal Favret        AWZW LMWD discussed with:   ______staff nurse, patient_________________________________________________________    Danyelle Quill, NP

## 2021-06-14 VITALS
HEART RATE: 98 BPM | WEIGHT: 98.11 LBS | HEIGHT: 60 IN | TEMPERATURE: 98.6 F | BODY MASS INDEX: 19.26 KG/M2 | OXYGEN SATURATION: 97 % | SYSTOLIC BLOOD PRESSURE: 102 MMHG | DIASTOLIC BLOOD PRESSURE: 59 MMHG | RESPIRATION RATE: 17 BRPM

## 2021-06-14 LAB
ANION GAP SERPL CALC-SCNC: 5 MMOL/L (ref 5–15)
BUN SERPL-MCNC: 18 MG/DL (ref 6–20)
BUN/CREAT SERPL: 24 (ref 12–20)
CA-I BLD-MCNC: 8.8 MG/DL (ref 8.5–10.1)
CHLORIDE SERPL-SCNC: 105 MMOL/L (ref 97–108)
CO2 SERPL-SCNC: 30 MMOL/L (ref 21–32)
CREAT SERPL-MCNC: 0.76 MG/DL (ref 0.55–1.02)
ERYTHROCYTE [DISTWIDTH] IN BLOOD BY AUTOMATED COUNT: 13.9 % (ref 11.5–14.5)
GLUCOSE BLD STRIP.AUTO-MCNC: 125 MG/DL (ref 65–117)
GLUCOSE BLD STRIP.AUTO-MCNC: 149 MG/DL (ref 65–117)
GLUCOSE SERPL-MCNC: 132 MG/DL (ref 65–100)
HCT VFR BLD AUTO: 37 % (ref 35–47)
HGB BLD-MCNC: 12 G/DL (ref 11.5–16)
MAGNESIUM SERPL-MCNC: 2 MG/DL (ref 1.6–2.4)
MCH RBC QN AUTO: 30.1 PG (ref 26–34)
MCHC RBC AUTO-ENTMCNC: 32.4 G/DL (ref 30–36.5)
MCV RBC AUTO: 92.7 FL (ref 80–99)
NRBC # BLD: 0 K/UL (ref 0–0.01)
NRBC BLD-RTO: 0 PER 100 WBC
PERFORMED BY, TECHID: ABNORMAL
PERFORMED BY, TECHID: ABNORMAL
PLATELET # BLD AUTO: 273 K/UL (ref 150–400)
PMV BLD AUTO: 10.2 FL (ref 8.9–12.9)
POTASSIUM SERPL-SCNC: 3.9 MMOL/L (ref 3.5–5.1)
RBC # BLD AUTO: 3.99 M/UL (ref 3.8–5.2)
SODIUM SERPL-SCNC: 140 MMOL/L (ref 136–145)
WBC # BLD AUTO: 9.2 K/UL (ref 3.6–11)

## 2021-06-14 PROCEDURE — 82962 GLUCOSE BLOOD TEST: CPT

## 2021-06-14 PROCEDURE — 74011250636 HC RX REV CODE- 250/636: Performed by: INTERNAL MEDICINE

## 2021-06-14 PROCEDURE — 85027 COMPLETE CBC AUTOMATED: CPT

## 2021-06-14 PROCEDURE — 74011250637 HC RX REV CODE- 250/637: Performed by: NURSE PRACTITIONER

## 2021-06-14 PROCEDURE — 36415 COLL VENOUS BLD VENIPUNCTURE: CPT

## 2021-06-14 PROCEDURE — 74011250637 HC RX REV CODE- 250/637: Performed by: INTERNAL MEDICINE

## 2021-06-14 PROCEDURE — 80048 BASIC METABOLIC PNL TOTAL CA: CPT

## 2021-06-14 PROCEDURE — 83735 ASSAY OF MAGNESIUM: CPT

## 2021-06-14 RX ORDER — POLYETHYLENE GLYCOL 3350 17 G/17G
17 POWDER, FOR SOLUTION ORAL DAILY
Qty: 30 PACKET | Refills: 0 | Status: SHIPPED | OUTPATIENT
Start: 2021-06-14 | End: 2021-07-14

## 2021-06-14 RX ORDER — TRAZODONE HYDROCHLORIDE 100 MG/1
100 TABLET ORAL
Qty: 30 TABLET | Refills: 0 | Status: SHIPPED | OUTPATIENT
Start: 2021-06-14 | End: 2021-07-14

## 2021-06-14 RX ORDER — ACETAMINOPHEN 325 MG/1
650 TABLET ORAL
Qty: 180 TABLET | Refills: 0 | Status: SHIPPED | OUTPATIENT
Start: 2021-06-14 | End: 2021-07-14

## 2021-06-14 RX ORDER — AMLODIPINE BESYLATE 10 MG/1
10 TABLET ORAL DAILY
Qty: 30 TABLET | Refills: 0 | Status: SHIPPED | OUTPATIENT
Start: 2021-06-15 | End: 2021-07-15

## 2021-06-14 RX ORDER — LOSARTAN POTASSIUM 100 MG/1
100 TABLET ORAL DAILY
Qty: 30 TABLET | Refills: 0 | Status: SHIPPED | OUTPATIENT
Start: 2021-06-15 | End: 2021-07-15

## 2021-06-14 RX ORDER — ONDANSETRON 4 MG/1
4 TABLET, ORALLY DISINTEGRATING ORAL
Qty: 120 TABLET | Refills: 0 | Status: SHIPPED | OUTPATIENT
Start: 2021-06-14 | End: 2021-07-14

## 2021-06-14 RX ORDER — FAMOTIDINE 40 MG/1
40 TABLET, FILM COATED ORAL DAILY
Qty: 30 TABLET | Refills: 0 | Status: SHIPPED | OUTPATIENT
Start: 2021-06-15 | End: 2021-07-15

## 2021-06-14 RX ORDER — HYDROXYZINE PAMOATE 25 MG/1
25 CAPSULE ORAL
Qty: 60 CAPSULE | Refills: 0 | Status: SHIPPED | OUTPATIENT
Start: 2021-06-14 | End: 2021-07-14

## 2021-06-14 RX ADMIN — POLYETHYLENE GLYCOL 3350 17 G: 17 POWDER, FOR SOLUTION ORAL at 14:06

## 2021-06-14 RX ADMIN — ASPIRIN 325 MG ORAL TABLET 325 MG: 325 PILL ORAL at 09:30

## 2021-06-14 RX ADMIN — LOSARTAN POTASSIUM 100 MG: 50 TABLET, FILM COATED ORAL at 09:30

## 2021-06-14 RX ADMIN — FAMOTIDINE 40 MG: 20 TABLET ORAL at 09:30

## 2021-06-14 RX ADMIN — Medication 1000 UNITS: at 09:31

## 2021-06-14 RX ADMIN — ENOXAPARIN SODIUM 30 MG: 40 INJECTION SUBCUTANEOUS at 09:31

## 2021-06-14 RX ADMIN — AMLODIPINE BESYLATE 10 MG: 5 TABLET ORAL at 09:30

## 2021-06-14 NOTE — DISCHARGE SUMMARY
Hospitalist Discharge Summary     Patient ID:    Selena Ness  [id number]  2 y.o.  1935    Admit date: 6/7/2021    Discharge date : 6/14/2021    Chronic Diagnoses:    Problem List as of 6/14/2021 Date Reviewed: 6/8/2021        Codes Class Noted - Resolved    Dysarthria ICD-10-CM: R47.1  ICD-9-CM: 784.51  6/8/2021 - Present        Dysphagia ICD-10-CM: R13.10  ICD-9-CM: 787.20  6/7/2021 - Present        Open wound of right hand ICD-10-CM: H21.709E  ICD-9-CM: 882.0  11/19/2020 - Present        Open wound of right index finger due to animal bite ICD-10-CM: S61.250A  ICD-9-CM: 883.0  11/19/2020 - Present        Benign essential hypertension ICD-10-CM: I10  ICD-9-CM: 401.1  11/19/2020 - Present          22    Final Diagnoses: Active Problems:    Dysphagia (6/7/2021)      Dysarthria (6/8/2021)        Reason for Hospitalization:    35-year-old female with past medical history multiple comorbidities presents to [facility] with dysphagia. Of note history obtained from patient and patient's daughter who was present at bedside, of note this is patient's sixth ER visit for complaints of dysphagia. As per patient and patient's daughter over the past few weeks patient's been having gradual onset persistent progressive dysphagia to solids which is now progressed to liquids, patient is unable to tolerate p.o. following which patient presented to the ED. Hospital Course:   Pt presented with significant dysarthria and s/s of protein calorie malnutrition. Evaluated by speech , not able to undergo MBS, or take in pureed diet without the risk of aspiration. Neurology was consulted and sent myasthenia gravis blood panel for analysis, and recommend outpatient follow up for EMG to evaluate motor neuron abnormalities.  A PEG tube was placed for nutritional support, and pt will continue with recommended TF , Jevity 1.5 , bolus feeds 5 times per day, 180ml followed by 160 ml free water after. All medications will be via PEG tube for now, pt will need outpatient speech follow up for several weeks or months to ensure able to take adequate oral diet before considering PEG tube to be removed. Pt's speech has greatly improved after PEG placement, is less dysarthric, tolerates feeding without residual or N/V. Discussed plan of care with pt's tereso and she is in agreement with discharge plan and family planning to have pt live with someone after d/c from SNF. Pt is stable for discharge to SNF today. Discharge Medications:   Current Discharge Medication List      START taking these medications    Details   acetaminophen (TYLENOL) 325 mg tablet Take 2 Tablets by mouth every four (4) hours as needed for Pain or Fever for up to 30 days. Qty: 180 Tablet, Refills: 0  Start date: 6/14/2021, End date: 7/14/2021      famotidine (PEPCID) 40 mg tablet 1 Tablet by Per G Tube route daily for 30 days. Qty: 30 Tablet, Refills: 0  Start date: 6/15/2021, End date: 7/15/2021      ondansetron (ZOFRAN ODT) 4 mg disintegrating tablet 1 Tablet by SubLINGual route every eight (8) hours as needed for Nausea or Vomiting for up to 30 days. Qty: 120 Tablet, Refills: 0  Start date: 6/14/2021, End date: 7/14/2021      polyethylene glycol (MIRALAX) 17 gram packet 1 Packet by Per G Tube route daily for 30 days. Qty: 30 Packet, Refills: 0  Start date: 6/14/2021, End date: 7/14/2021         CONTINUE these medications which have CHANGED    Details   hydrOXYzine pamoate (VistariL) 25 mg capsule Take 1 Capsule by mouth two (2) times daily as needed for Anxiety for up to 30 days. Qty: 60 Capsule, Refills: 0  Start date: 6/14/2021, End date: 7/14/2021      amLODIPine (NORVASC) 10 mg tablet Take 1 Tablet by mouth daily for 30 days. Qty: 30 Tablet, Refills: 0  Start date: 6/15/2021, End date: 7/15/2021      losartan (COZAAR) 100 mg tablet Take 1 Tablet by mouth daily for 30 days.   Qty: 30 Tablet, Refills: 0  Start date: 6/15/2021, End date: 7/15/2021      traZODone (DESYREL) 100 mg tablet Take 1 Tablet by mouth nightly as needed for Sleep for up to 30 days. Qty: 30 Tablet, Refills: 0  Start date: 6/14/2021, End date: 7/14/2021         CONTINUE these medications which have NOT CHANGED    Details   aspirin (aspirin) 325 mg tablet Take 325 mg by mouth daily. cholecalciferol, vitamin D3, (VITAMIN D3) 2,000 unit Tab Take 1 Tab by mouth daily. STOP taking these medications       methylPREDNISolone (Medrol, Rey,) 4 mg tablet Comments:   Reason for Stopping:         nitrofurantoin, macrocrystal-monohydrate, (Macrobid) 100 mg capsule Comments:   Reason for Stopping: Follow up Care:    1. Mary Jane Willson MD in 6 weeks. Follow-up Information     Follow up With Specialties Details Why Varghese Santana MD Geriatric Medicine Schedule an appointment as soon as possible for a visit in 6 days  [id number]      Frankie Jorgensen MD Neurology In 2 weeks new patient referral after hospital admission [facility] [facility]      Amol Soriano MD Gastroenterology, Internal Medicine In 4 weeks follow up after PEG placement in hospital  [address] [place] [address]  [phone]              Patient Follow Up Instructions: Activity: Activity as tolerated  Diet:  PEG feeding Jevity 1.5 180 ml x 5 day, 160 ml water flushes after each  Feeding. Wound Care: N/A    Condition at Discharge:  Stable  __________________________________________________________________    Disposition  [facility]  ____________________________________________________________________    Code Status:  DNR  ___________________________________________________________________    Discharge Exam:  Patient seen and examined by me on discharge day.   Physical Exam  Constitutional:       General: She is not in acute distress. Cardiovascular:      Rate and Rhythm: Normal rate and regular rhythm. Pulses: Normal pulses. Heart sounds: Murmur heard. Pulmonary:      Effort: Pulmonary effort is normal.      Breath sounds: Normal breath sounds. Abdominal:      General: Bowel sounds are normal.      Comments: PEG tube intactd   Skin:     General: Skin is warm and dry. Neurological:      General: No focal deficit present. Mental Status: She is alert and oriented to person, place, and time.       Comments: Less dysarthria   Psychiatric:         Mood and Affect: Mood normal.         Behavior: Behavior normal.          CONSULTATIONS: GI, Neurology    Significant Diagnostic Studies:   Recent Results (from the past 24 hour(s))   GLUCOSE, POC    Collection Time: 06/13/21  3:58 PM   Result Value Ref Range    Glucose (POC) 161 (H) 65 - 117 mg/dL    Performed by Makenna Smart    GLUCOSE, POC    Collection Time: 06/13/21  8:24 PM   Result Value Ref Range    Glucose (POC) 123 (H) 65 - 117 mg/dL    Performed by Morenita Hudson    CBC W/O DIFF    Collection Time: 06/14/21  6:40 AM   Result Value Ref Range    WBC 9.2 3.6 - 11.0 K/uL    RBC 3.99 3.80 - 5.20 M/uL    HGB 12.0 11.5 - 16.0 g/dL    HCT 37.0 35.0 - 47.0 %    MCV 92.7 80.0 - 99.0 FL    MCH 30.1 26.0 - 34.0 PG    MCHC 32.4 30.0 - 36.5 g/dL    RDW 13.9 11.5 - 14.5 %    PLATELET 742 357 - 925 K/uL    MPV 10.2 8.9 - 12.9 FL    NRBC 0.0 0.0  WBC    ABSOLUTE NRBC 0.00 0.00 - 0.70 K/uL   METABOLIC PANEL, BASIC    Collection Time: 06/14/21  6:40 AM   Result Value Ref Range    Sodium 140 136 - 145 mmol/L    Potassium 3.9 3.5 - 5.1 mmol/L    Chloride 105 97 - 108 mmol/L    CO2 30 21 - 32 mmol/L    Anion gap 5 5 - 15 mmol/L    Glucose 132 (H) 65 - 100 mg/dL    BUN 18 6 - 20 mg/dL    Creatinine 0.76 0.55 - 1.02 mg/dL    BUN/Creatinine ratio 24 (H) 12 - 20      GFR est AA >60 >60 ml/min/1.73m2    GFR est non-AA >60 >60 ml/min/1.73m2    Calcium 8.8 8.5 - 10.1 mg/dL   MAGNESIUM Collection Time: 06/14/21  6:40 AM   Result Value Ref Range    Magnesium 2.0 1.6 - 2.4 mg/dL   GLUCOSE, POC    Collection Time: 06/14/21  7:32 AM   Result Value Ref Range    Glucose (POC) 125 (H) 65 - 117 mg/dL    Performed by Jay Jay Gonzalez      MRI BRAIN W WO CONT   Final Result   1. No evidence of acute intracranial ischemia, mass or hemorrhage. 2.  Mild to moderate chronic small vessel ischemic changes. 3.  No abnormal parenchymal enhancement or suspicious cranial nerve enhancement   identified. Please note study was not optimized for evaluation of the posterior   fossa or cranial nerves. MRI BRAIN WO CONT   Final Result   1. No acute finding. 2. Mild changes of suspected small vessel ischemic disease with diffuse cerebral   atrophy. 3. 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 CT HEAD WO CONT   Final Result   1. No acute intracranial findings. 2. Atrophy and small vessel ischemic disease. Discharge: time spent 45 minutes in discharge  Education and counseling of family and patient.      Signed:  Kenney Main NP  6/14/2021  10:32 AM

## 2021-06-14 NOTE — DISCHARGE INSTRUCTIONS
Patient Education        Learning About Swallowing Problems  What are swallowing problems? Certain health problems that affect the nervous system can cause trouble swallowing. These conditions include stroke, ALS (also known as Ritika Gehrig's disease), Parkinson's disease, and multiple sclerosis. The muscles and nerves that help move food through the throat and esophagus may not work right. Growths, such as cancer, and other problems with your esophagus can also make it hard to swallow. The esophagus is the tube that leads from your throat to your stomach. How are swallowing problems diagnosed? A doctor or speech therapist will examine you to check for swallowing problems. You may get swallowing tests to check how well your throat muscles work. For these tests, you swallow a special liquid that helps the doctor see your throat and esophagus on an X-ray or video screen. Other tests use a thin, flexible tube called a scope to check for problems with your esophagus. The doctor puts the scope in your mouth and down your throat to look at your esophagus. What are the symptoms? Symptoms of swallowing problems may include:  · Trouble getting food or liquids to go down on the first try. · Gagging, choking, or coughing when you swallow. · Having food or liquids come back up through your throat, mouth, or nose after you swallow. · Feeling like foods or liquids are stuck in some part of your throat or chest.  · Pain when you swallow. How are swallowing problems treated? How swallowing problems are treated depends on the cause. The main goals of treatment will be to help you eat and swallow safely and get good nutrition. This is important for your health and quality of life. You may learn exercises to train your throat muscles to work together so you're able to swallow better. Learning certain ways to put food in your mouth or to position your head while eating may also help.   Your doctor or a speech therapist may recommend changes to your diet to help make it easier to swallow. You may need to avoid certain foods or liquids. You also may need to change the thickness of foods or liquids in your diet. To eat and swallow safely, follow any instructions you get from your doctor or therapist. These ideas may help:  · Sit upright when eating, drinking, and taking pills. · Take small bites of food. Chew completely and swallow before taking another bite. · Take small sips of liquids. · If eating makes you tired, eat smaller but more frequent meals. · Tip your chin down when there is food in your mouth. Where can you learn more? Go to http://www.gray.com/  Enter D018 in the search box to learn more about \"Learning About Swallowing Problems. \"  Current as of: February 26, 2020               Content Version: 12.8  © 9261-5396 [employer], [employer]. Care instructions adapted under license by [employer] (which disclaims liability or warranty for this information). If you have questions about a medical condition or this instruction, always ask your healthcare professional. [place] [address] any warranty or liability for your use of this information.

## 2021-06-14 NOTE — PROGRESS NOTES
Patient going to [facility] report called to Latonia Saucedo RN at the facility [facility]

## 2021-06-14 NOTE — PROGRESS NOTES
Patient is ready for discharge. She has been accepted at [facility] located at [address] [place] [address]. CM called Patients granddaughter Yamile Sanchez [phone], she agrees with discharge to [facility] today. ABNER called [facility] admissions liaison Ursula Sidhu [phone], she will give CM room number soon.

## 2021-06-14 NOTE — PROGRESS NOTES
Patient will go into room 202A. Nurse to call report [phone]. Transport to be set up to [address] [place] [address]. Discharge plan of care/case management plan validated with provider discharge order.

## 2021-06-15 NOTE — PROGRESS NOTES
[facility]  here to transport patient to [facility] and Rehab. IV removed by pervious nurse. Patient alert and oriented,denies pain. No distress noted. Vital signs stable. Belongings sent with patient.

## 2021-06-16 LAB
ACHR AB SER-SCNC: 43.1 NMOL/L (ref 0–0.24)
ACHR BLOCK AB SER-ACNC: 49 % (ref 0–25)
ACHR MOD AB/ACHR TOTAL SFR SER: 59 % (ref 0–20)
STRIA MUS AB TITR SER IF: ABNORMAL {TITER}

## 2022-09-21 ENCOUNTER — APPOINTMENT (OUTPATIENT)
Dept: GENERAL RADIOLOGY | Age: 87
End: 2022-09-21
Attending: EMERGENCY MEDICINE
Payer: MEDICARE

## 2022-09-21 ENCOUNTER — HOSPITAL ENCOUNTER (EMERGENCY)
Age: 87
Discharge: NURSING, REHAB OR LONG-TERM CARE | End: 2022-09-21
Attending: EMERGENCY MEDICINE
Payer: MEDICARE

## 2022-09-21 VITALS
OXYGEN SATURATION: 98 % | WEIGHT: 105 LBS | RESPIRATION RATE: 16 BRPM | BODY MASS INDEX: 20.62 KG/M2 | HEIGHT: 60 IN | TEMPERATURE: 97.9 F | SYSTOLIC BLOOD PRESSURE: 132 MMHG | HEART RATE: 81 BPM | DIASTOLIC BLOOD PRESSURE: 78 MMHG

## 2022-09-21 DIAGNOSIS — Z93.1 STATUS POST INSERTION OF PERCUTANEOUS ENDOSCOPIC GASTROSTOMY (PEG) TUBE (HCC): Primary | ICD-10-CM

## 2022-09-21 PROCEDURE — 74018 RADEX ABDOMEN 1 VIEW: CPT

## 2022-09-21 PROCEDURE — 75810000109 HC GASTRO TUBE CHANGE

## 2022-09-21 PROCEDURE — 99283 EMERGENCY DEPT VISIT LOW MDM: CPT

## 2022-09-21 PROCEDURE — 74011000636 HC RX REV CODE- 636: Performed by: EMERGENCY MEDICINE

## 2022-09-21 RX ADMIN — DIATRIZOATE MEGLUMINE AND DIATRIZOATE SODIUM 30 ML: 660; 100 LIQUID ORAL; RECTAL at 08:35

## 2022-09-21 NOTE — ED NOTES
Patient discharged to [facility] following routine work found to to be within defined limits. Medications, fluids and treatments tolerated well by patient. Normal vital signs. Reviewed follow up instructions and medications with patient. Extensive conversation regarding return precautions discussed. Patient acknowledged understanding. All personal belongings taken by patient.

## 2022-09-21 NOTE — ED PROVIDER NOTES
EMERGENCY DEPARTMENT HISTORY AND PHYSICAL EXAM      Date: 9/21/2022  Patient Name: Manuela Gauthier    History of Presenting Illness     Chief Complaint   Patient presents with    Feeding Tube Problem       History Provided By: Patient    HPI: Manuela Gauthier, 80 y.o. female Came in after PEG tube fell out around 5 AM this morning. A Mares catheter was placed in stoma. There are no other complaints, changes, or physical findings at this time. PCP: Soledad Sales MD    No current facility-administered medications on file prior to encounter. Current Outpatient Medications on File Prior to Encounter   Medication Sig Dispense Refill    aspirin (aspirin) 325 mg tablet Take 325 mg by mouth daily. cholecalciferol, vitamin D3, (VITAMIN D3) 2,000 unit Tab Take 1 Tab by mouth daily. Past History     Past Medical History:  Past Medical History:   Diagnosis Date    Arthritis     GERD (gastroesophageal reflux disease)     Hypertension     Other ill-defined conditions(799.89)     Diverticulitis    Psychiatric disorder     depression       Past Surgical History:  Past Surgical History:   Procedure Laterality Date    HX GI  2010    Colon resection    HX GYN  1973    Hysterectomy    HX HEENT  2013    Laser eye surgery on left    HX ORTHOPAEDIC  1995    Left rotator cuff surgery    HX ORTHOPAEDIC      right 2nd finger       Family History:  Family History   Problem Relation Age of Onset    Lung Disease Mother     Diabetes Sister     Heart Disease Sister        Social History:  Social History     Tobacco Use    Smoking status: Former    Smokeless tobacco: Former   Substance Use Topics    Alcohol use: No    Drug use: No       Allergies: Allergies   Allergen Reactions    Penicillin G Anaphylaxis    Codeine Other (comments)     I can't walk and don't have control over my body       Review of Systems   Review of Systems   Constitutional:  Negative for chills and fever. Respiratory: Negative. Cardiovascular: Negative. Gastrointestinal: Negative. Genitourinary: Negative. Musculoskeletal: Negative. Physical Exam   Physical Exam  Vitals and nursing note reviewed. Constitutional:       General: She is not in acute distress. Appearance: Normal appearance. HENT:      Head: Normocephalic and atraumatic. Mouth/Throat:      Mouth: Mucous membranes are moist.   Eyes:      Conjunctiva/sclera: Conjunctivae normal.   Pulmonary:      Effort: Pulmonary effort is normal. No respiratory distress. Abdominal:      Comments: Mares catheter in place in stoma. Musculoskeletal:      Cervical back: Normal range of motion. Skin:     General: Skin is warm and dry. Neurological:      General: No focal deficit present. Mental Status: She is alert and oriented to person, place, and time. Mental status is at baseline. Lab and Diagnostic Study Results   Labs -   No results found for this or any previous visit (from the past 12 hour(s)). Radiologic Studies -   @lastxrresult@  CT Results  (Last 48 hours)      None          CXR Results  (Last 48 hours)      None            Medical Decision Making and ED Course   Differential Diagnosis & Medical Decision Making Provider Note:     Peg tube fell out prior to arrival. 16 Sami peg tube placed withouth difficulty. Placement checked with imaging. Will discharge back to facility.     - I am the first provider for this patient. I reviewed the vital signs, available nursing notes, past medical history, past surgical history, family history and social history. The patients presenting problems have been discussed, and they are in agreement with the care plan formulated and outlined with them. I have encouraged them to ask questions as they arise throughout their visit. Vital Signs-Reviewed the patient's vital signs.   Patient Vitals for the past 12 hrs:   Temp Pulse Resp BP SpO2   09/21/22 0608 -- -- -- -- 97 %   09/21/22 0603 97.9 °F (36.6 °C) 88 18 (!) 155/67 97 %       ED Course:   ED Course as of 09/21/22 0844   Wed Sep 21, 2022   0751 16 Turkish peg tube placed [KK]      ED Course User Index  Bertram Anton MD           Disposition   Disposition: DC- Adult Discharges: All of the diagnostic tests were reviewed and questions answered. Diagnosis, care plan and treatment options were discussed. The patient understands the instructions and will follow up as directed. The patients results have been reviewed with them. They have been counseled regarding their diagnosis. The patient verbally convey understanding and agreement of the signs, symptoms, diagnosis, treatment and prognosis and additionally agrees to follow up as recommended with their PCP in 24 - 48 hours. They also agree with the care-plan and convey that all of their questions have been answered. I have also put together some discharge instructions for them that include: 1) educational information regarding their diagnosis, 2) how to care for their diagnosis at home, as well a 3) list of reasons why they would want to return to the ED prior to their follow-up appointment, should their condition change. DISCHARGE PLAN:  1. Current Discharge Medication List        CONTINUE these medications which have NOT CHANGED    Details   aspirin (aspirin) 325 mg tablet Take 325 mg by mouth daily. cholecalciferol, vitamin D3, (VITAMIN D3) 2,000 unit Tab Take 1 Tab by mouth daily. 2.   Follow-up Information       Follow up With Specialties Details Why Contact Info    Eric Torrez MD Geriatric Medicine Physician   [address] [id number] [phone]            3. Return to ED if worse   4. Current Discharge Medication List            Diagnosis/Clinical Impression     Clinical Impression:   1.  Status post insertion of percutaneous endoscopic gastrostomy (PEG) tube ([facility])        Attestations: Avery BOYKIN MD, am the primary clinician of record. Please note that this dictation was completed with Qulsar, the computer voice recognition software. Quite often unanticipated grammatical, syntax, homophones, and other interpretive errors are inadvertently transcribed by the computer software. Please disregard these errors. Please excuse any errors that have escaped final proofreading. Thank you.

## 2022-10-28 ENCOUNTER — HOSPITAL ENCOUNTER (EMERGENCY)
Age: 87
Discharge: HOME | End: 2022-10-28
Payer: MEDICARE

## 2022-10-28 ENCOUNTER — APPOINTMENT (OUTPATIENT)
Dept: CT IMAGING | Age: 87
End: 2022-10-28
Attending: EMERGENCY MEDICINE
Payer: MEDICARE

## 2022-10-28 ENCOUNTER — APPOINTMENT (OUTPATIENT)
Dept: GENERAL RADIOLOGY | Age: 87
End: 2022-10-28
Attending: EMERGENCY MEDICINE
Payer: MEDICARE

## 2022-10-28 VITALS
HEIGHT: 60 IN | RESPIRATION RATE: 17 BRPM | SYSTOLIC BLOOD PRESSURE: 135 MMHG | TEMPERATURE: 98 F | HEART RATE: 90 BPM | WEIGHT: 102 LBS | BODY MASS INDEX: 20.03 KG/M2 | OXYGEN SATURATION: 96 % | DIASTOLIC BLOOD PRESSURE: 70 MMHG

## 2022-10-28 DIAGNOSIS — R51.9 NONINTRACTABLE HEADACHE, UNSPECIFIED CHRONICITY PATTERN, UNSPECIFIED HEADACHE TYPE: ICD-10-CM

## 2022-10-28 DIAGNOSIS — Z76.0 MEDICATION REFILL: Primary | ICD-10-CM

## 2022-10-28 DIAGNOSIS — G47.00 INSOMNIA, UNSPECIFIED TYPE: ICD-10-CM

## 2022-10-28 DIAGNOSIS — N30.00 ACUTE CYSTITIS WITHOUT HEMATURIA: ICD-10-CM

## 2022-10-28 LAB
APPEARANCE UR: CLEAR
BACTERIA URNS QL MICRO: NEGATIVE /HPF
BILIRUB UR QL: NEGATIVE
COLOR UR: YELLOW
FLUAV AG NPH QL IA: NEGATIVE
FLUBV AG NOSE QL IA: NEGATIVE
GLUCOSE UR STRIP.AUTO-MCNC: NEGATIVE MG/DL
HGB UR QL STRIP: NEGATIVE
KETONES UR QL STRIP.AUTO: NEGATIVE MG/DL
LEUKOCYTE ESTERASE UR QL STRIP.AUTO: ABNORMAL
NITRITE UR QL STRIP.AUTO: NEGATIVE
PH UR STRIP: 8 [PH] (ref 5–8)
PROT UR STRIP-MCNC: NEGATIVE MG/DL
RBC #/AREA URNS HPF: ABNORMAL /HPF (ref 0–5)
SARS-COV-2, COV2: NORMAL
SP GR UR REFRACTOMETRY: 1.01 (ref 1–1.03)
UA: UC IF INDICATED,UAUC: ABNORMAL
UROBILINOGEN UR QL STRIP.AUTO: 0.1 EU/DL (ref 0.2–1)
WBC URNS QL MICRO: ABNORMAL /HPF (ref 0–4)

## 2022-10-28 PROCEDURE — U0005 INFEC AGEN DETEC AMPLI PROBE: HCPCS

## 2022-10-28 PROCEDURE — 74011250637 HC RX REV CODE- 250/637: Performed by: NURSE PRACTITIONER

## 2022-10-28 PROCEDURE — 71046 X-RAY EXAM CHEST 2 VIEWS: CPT

## 2022-10-28 PROCEDURE — 87804 INFLUENZA ASSAY W/OPTIC: CPT

## 2022-10-28 PROCEDURE — 99285 EMERGENCY DEPT VISIT HI MDM: CPT

## 2022-10-28 PROCEDURE — 70450 CT HEAD/BRAIN W/O DYE: CPT

## 2022-10-28 PROCEDURE — 81001 URINALYSIS AUTO W/SCOPE: CPT

## 2022-10-28 PROCEDURE — 93005 ELECTROCARDIOGRAM TRACING: CPT

## 2022-10-28 RX ORDER — ACETAMINOPHEN 325 MG/1
650 TABLET ORAL ONCE
Status: COMPLETED | OUTPATIENT
Start: 2022-10-28 | End: 2022-10-28

## 2022-10-28 RX ORDER — MIRTAZAPINE 7.5 MG/1
15 TABLET, FILM COATED ORAL
Qty: 28 TABLET | Refills: 0 | Status: SHIPPED | OUTPATIENT
Start: 2022-10-28 | End: 2022-11-11

## 2022-10-28 RX ORDER — NITROFURANTOIN 25; 75 MG/1; MG/1
100 CAPSULE ORAL 2 TIMES DAILY
Qty: 10 CAPSULE | Refills: 0 | Status: SHIPPED | OUTPATIENT
Start: 2022-10-28 | End: 2022-11-02

## 2022-10-28 RX ADMIN — ACETAMINOPHEN 650 MG: 325 TABLET, FILM COATED ORAL at 15:20

## 2022-10-28 NOTE — Clinical Note
[address] EMERGENCY DEPARTMENT  [address] [facility] [place] [address]  [phone]    Work/School Note    Date: 10/28/2022    To Whom It May concern:      Rajni Perez was seen and treated today in the emergency room by the following provider(s):  Nurse Practitioner: Kylie Contreras NP. Rajni Perez is excused from work/school on 10/28/22. She is clear to return to work/school on 10/29/22.         Sincerely,          Ben Lara

## 2022-10-28 NOTE — DISCHARGE INSTRUCTIONS
Thank you! Thank you for allowing me to care for you in the emergency department. It is my goal to provide you with excellent care. If you have not received excellent quality care, please ask to speak to the nurse manager. Please fill out the survey that will come to you by mail or email since we listen to your feedback! Below you will find a list of your tests from today's visit. Should you have any questions, please do not hesitate to call the emergency department. Labs  Recent Results (from the past 12 hour(s))   INFLUENZA A & B AG (RAPID TEST)    Collection Time: 10/28/22  2:30 PM   Result Value Ref Range    Influenza A Antigen Negative Negative      Influenza B Antigen Negative Negative     SARS-COV-2    Collection Time: 10/28/22  2:30 PM   Result Value Ref Range    SARS-CoV-2 by PCR Please find results under separate order     URINALYSIS W/ REFLEX CULTURE    Collection Time: 10/28/22  2:30 PM    Specimen: Urine   Result Value Ref Range    Color Yellow      Appearance Clear Clear      Specific gravity 1.010 1.003 - 1.030      pH (UA) 8.0 5.0 - 8.0      Protein Negative Negative mg/dL    Glucose Negative Negative mg/dL    Ketone Negative Negative mg/dL    Bilirubin Negative Negative      Blood Negative Negative      Urobilinogen 0.1 (L) 0.2 - 1.0 EU/dL    Nitrites Negative Negative      Leukocyte Esterase Large (A) Negative      WBC 0-4 0 - 4 /hpf    RBC 0-5 0 - 5 /hpf    Bacteria Negative Negative /hpf    UA:UC IF INDICATED Culture not indicated by UA result Culture not indicated by UA result         Radiologic Studies  XR CHEST PA LAT   Final Result   No acute cardiopulmonary disease. CT HEAD WO CONT   Final Result   No acute intracranial hemorrhage, mass or infarct. CT Results  (Last 48 hours)                 10/28/22 1452  CT HEAD WO CONT Final result    Impression:  No acute intracranial hemorrhage, mass or infarct.             Narrative:  INDICATION: headache        Exam: Noncontrast CT of the brain is performed with 5 mm collimation. CT dose reduction was achieved with the use of the standardized protocol   tailored for this examination and automatic exposure control for dose   modulation. Direct comparison is made to prior MR dated June 2021. FINDINGS: There is no acute intracranial hemorrhage, mass, mass effect or   herniation. Ventricular system is normal. The gray-white matter differentiation   is well-preserved. The mastoid air cells are well pneumatized. The visualized   paranasal sinuses are normal.                 CXR Results  (Last 48 hours)                 10/28/22 1455  XR CHEST PA LAT Final result    Impression:  No acute cardiopulmonary disease. Narrative: Indication:  body aches, headache        Exam: PA and lateral views of the chest.       Direct comparison is made to prior CXR dated June 2021. Findings: Cardiomediastinal silhouette is within normal limits. Lungs are clear   bilaterally. Pleural spaces are normal. Osseous structures are diffusely   demineralized, but intact.                 ------------------------------------------------------------------------------------------------------------  The exam and treatment you received in the Emergency Department were for an urgent problem and are not intended as complete care. It is important that you follow-up with a doctor, nurse practitioner, or physician assistant to:  (1) confirm your diagnosis,  (2) re-evaluation of changes in your illness and treatment, and  (3) for ongoing care. Please take your discharge instructions with you when you go to your follow-up appointment. If you have any problem arranging a follow-up appointment, contact the Emergency Department. If your symptoms become worse or you do not improve as expected and you are unable to reach your health care provider, please return to the Emergency Department. We are available 24 hours a day.      If a prescription has been provided, please have it filled as soon as possible to prevent a delay in treatment. If you have any questions or reservations about taking the medication due to side effects or interactions with other medications, please call your primary care provider or contact the ER.

## 2022-10-28 NOTE — Clinical Note
[address] EMERGENCY DEPARTMENT  [address] [facility] [address]  [phone]    Work/School Note    Date: 10/28/2022    To Whom It May concern:      Reba Centeno was seen and treated today in the emergency room by the following provider(s):  Nurse Practitioner: Ebonie Cuello NP. Reba Centeno is excused from work/school on 10/28/22. She is clear to return to work/school on 10/29/22.         Sincerely,          Nicole Lou NP

## 2022-10-28 NOTE — ED PROVIDER NOTES
EMERGENCY DEPARTMENT HISTORY AND PHYSICAL EXAM      Date: 10/28/2022  Patient Name: Misael Benedict    History of Presenting Illness     Chief Complaint   Patient presents with    Flu Like Symptoms    Insomnia       History Provided By: Patient, patients daughter    HPI: Misael Beneidct, 80 y.o. female insomnia, depression, GERD, HTN c/o bilateral Frontal HA, the worse ever that started around 430 am feeling restless and sore throat. She denies any treatment or supportive care. Does admit to being out of her insomnia medications for the past 3 days. Denies any chest pain, SOB, fever, chills, lightheaded, dizzy, nausea, vomiting, abdominal pain, or any neurological changes. Daughter reports mother is currently at her baseline. Recent transition from nursing home about 1 week ago. There are no other complaints, changes, or physical findings at this time. PCP: Tho Carbajal MD    Current Outpatient Medications   Medication Sig Dispense Refill    mirtazapine (REMERON) 7.5 mg tablet Take 2 Tablets by mouth nightly for 14 days. 28 Tablet 0    nitrofurantoin, macrocrystal-monohydrate, (Macrobid) 100 mg capsule Take 1 Capsule by mouth two (2) times a day for 5 days. 10 Capsule 0    aspirin (aspirin) 325 mg tablet Take 325 mg by mouth daily. cholecalciferol, vitamin D3, (VITAMIN D3) 2,000 unit Tab Take 1 Tab by mouth daily.          Past History   Past Medical History:  Past Medical History:   Diagnosis Date    Arthritis     GERD (gastroesophageal reflux disease)     Hypertension     Other ill-defined conditions(799.89)     Diverticulitis    Psychiatric disorder     depression       Past Surgical History:  Past Surgical History:   Procedure Laterality Date    HX GI  2010    Colon resection    HX GYN  1973    Hysterectomy    HX HEENT  2013    Laser eye surgery on left    HX ORTHOPAEDIC  1995    Left rotator cuff surgery    HX ORTHOPAEDIC      right 2nd finger       Family History:  Family History   Problem Relation Age of Onset    Lung Disease Mother     Diabetes Sister     Heart Disease Sister        Social History:  Social History     Tobacco Use    Smoking status: Former    Smokeless tobacco: Former   Substance Use Topics    Alcohol use: No    Drug use: No       Allergies: Allergies   Allergen Reactions    Penicillin G Anaphylaxis    Codeine Other (comments)     I can't walk and don't have control over my body     Review of Systems   Review of Systems   Constitutional:  Negative for chills and fever. HENT:  Positive for sore throat. Negative for congestion, sinus pressure and sinus pain. Respiratory:  Negative for cough and shortness of breath. Cardiovascular:  Negative for chest pain and leg swelling. Gastrointestinal:  Negative for abdominal pain, nausea and vomiting. Genitourinary:  Negative for dysuria, frequency and urgency. Musculoskeletal:  Negative for arthralgias and myalgias. Skin: Negative. Neurological:  Positive for headaches. Negative for dizziness, weakness, light-headedness and numbness. Psychiatric/Behavioral:  Positive for sleep disturbance. All other systems reviewed and are negative. Physical Exam   Physical Exam  Vitals and nursing note reviewed. Constitutional:       General: She is not in acute distress. Appearance: Normal appearance. She is normal weight. She is not ill-appearing or toxic-appearing. HENT:      Head: Normocephalic and atraumatic. Right Ear: Hearing normal.      Left Ear: Hearing normal.      Nose: Nose normal.      Mouth/Throat:      Mouth: Mucous membranes are moist.   Eyes:      General: Lids are normal.      Extraocular Movements: Extraocular movements intact. Pupils: Pupils are equal, round, and reactive to light. Cardiovascular:      Rate and Rhythm: Normal rate and regular rhythm. Pulses: Normal pulses. Radial pulses are 2+ on the right side and 2+ on the left side.         Dorsalis pedis pulses are 2+ on the right side and 2+ on the left side. Pulmonary:      Effort: Pulmonary effort is normal. No accessory muscle usage or respiratory distress. Breath sounds: Normal breath sounds. No wheezing or rhonchi. Abdominal:      General: Bowel sounds are normal.      Palpations: Abdomen is soft. Tenderness: There is no abdominal tenderness. There is no right CVA tenderness or left CVA tenderness. Musculoskeletal:      Cervical back: Normal range of motion and neck supple. No muscular tenderness. Right lower leg: No edema. Left lower leg: No edema. Comments: Ambulatory with use of walker    Feet:      Right foot:      Skin integrity: No skin breakdown. Left foot:      Skin integrity: No skin breakdown. Skin:     General: Skin is warm and dry. Capillary Refill: Capillary refill takes less than 2 seconds. Findings: No abrasion, bruising, ecchymosis, erythema or signs of injury. Neurological:      Mental Status: She is alert and oriented to person, place, and time. GCS: GCS eye subscore is 4. GCS verbal subscore is 5. GCS motor subscore is 6. Sensory: Sensation is intact. Psychiatric:         Attention and Perception: Attention normal.         Mood and Affect: Mood normal.         Behavior: Behavior normal. Behavior is cooperative.          Cognition and Memory: Cognition normal.       Lab and Diagnostic Study Results   Labs -     Recent Results (from the past 12 hour(s))   INFLUENZA A & B AG (RAPID TEST)    Collection Time: 10/28/22  2:30 PM   Result Value Ref Range    Influenza A Antigen Negative Negative      Influenza B Antigen Negative Negative     SARS-COV-2    Collection Time: 10/28/22  2:30 PM   Result Value Ref Range    SARS-CoV-2 by PCR Please find results under separate order     [address]    Collection Time: 10/28/22  2:30 PM    Specimen: Urine   Result Value Ref Range    Color Yellow      Appearance Clear Clear      Specific gravity 1.010 1.003 - 1.030      pH (UA) 8.0 5.0 - 8.0      Protein Negative Negative mg/dL    Glucose Negative Negative mg/dL    Ketone Negative Negative mg/dL    Bilirubin Negative Negative      Blood Negative Negative      Urobilinogen 0.1 (L) 0.2 - 1.0 EU/dL    Nitrites Negative Negative      Leukocyte Esterase Large (A) Negative      WBC 0-4 0 - 4 /hpf    RBC 0-5 0 - 5 /hpf    Bacteria Negative Negative /hpf    UA:UC IF INDICATED Culture not indicated by UA result Culture not indicated by UA result         Radiologic Studies -   [unfilled]  CT Results  (Last 48 hours)                 10/28/22 1452  CT HEAD WO CONT Final result    Impression:  No acute intracranial hemorrhage, mass or infarct. Narrative:  INDICATION: headache        Exam: Noncontrast CT of the brain is performed with 5 mm collimation. CT dose reduction was achieved with the use of the standardized protocol   tailored for this examination and automatic exposure control for dose   modulation. Direct comparison is made to prior MR dated June 2021. FINDINGS: There is no acute intracranial hemorrhage, mass, mass effect or   herniation. Ventricular system is normal. The gray-white matter differentiation   is well-preserved. The mastoid air cells are well pneumatized. The visualized   paranasal sinuses are normal.                 CXR Results  (Last 48 hours)                 10/28/22 1455  XR CHEST PA LAT Final result    Impression:  No acute cardiopulmonary disease. Narrative: Indication:  body aches, headache        Exam: PA and lateral views of the chest.       Direct comparison is made to prior CXR dated June 2021. Findings: Cardiomediastinal silhouette is within normal limits. Lungs are clear   bilaterally. Pleural spaces are normal. Osseous structures are diffusely   demineralized, but intact.                    Medical Decision Making and ED Course   Differential Diagnosis & Medical Decision Making Provider Note: Medication withdrawal, viral illness, UTI, migraine, CVA    Patient afebrile not tachycardic SPO2 96% on room air. Alert and oriented x4 acting at baseline. Ports being out of her medication for the past 3 nights possible medication side effects CT head negative influenza negative chest x-ray negative UA with leukocytes noted we will treat for mild UTI Rx refill for medications given patient denies any additional complaints lungs clear to auscultation no rales rhonchi wheezing steady heart rate and rhythm has a follow-up appointment with PCP scheduled on Monday daughter advised signs symptoms to return to emergency room verbalized understanding patient stable at time of discharge. - I am the first and primary provider for this patient. I reviewed the vital signs, available nursing notes, past medical history, past surgical history, family history and social history. The patient's presenting problems have been discussed, and the staff are in agreement with the care plan formulated and outlined with them. I have encouraged them to ask questions as they arise throughout their visit. Vital Signs-Reviewed the patient's vital signs. Patient Vitals for the past 12 hrs:   Temp Pulse Resp BP SpO2   10/28/22 1428 98 °F (36.7 °C) 90 17 135/70 96 %       ED Course:          Procedures and Critical Care     Aakash Encinas NP    Disposition   Disposition: DC- Adult Discharges: All of the diagnostic tests were reviewed and questions answered. Diagnosis, care plan and treatment options were discussed. The patient understands the instructions and will follow up as directed. The patients results have been reviewed with them. They have been counseled regarding their diagnosis. The patient verbally convey understanding and agreement of the signs, symptoms, diagnosis, treatment and prognosis and additionally agrees to follow up as recommended with their PCP in 24 - 48 hours.   They also agree with the care-plan and convey that all of their questions have been answered. I have also put together some discharge instructions for them that include: 1) educational information regarding their diagnosis, 2) how to care for their diagnosis at home, as well a 3) list of reasons why they would want to return to the ED prior to their follow-up appointment, should their condition change. DISCHARGE PLAN:  1. Current Discharge Medication List        START taking these medications    Details   mirtazapine (REMERON) 7.5 mg tablet Take 2 Tablets by mouth nightly for 14 days. Qty: 28 Tablet, Refills: 0      nitrofurantoin, macrocrystal-monohydrate, (Macrobid) 100 mg capsule Take 1 Capsule by mouth two (2) times a day for 5 days. Qty: 10 Capsule, Refills: 0           CONTINUE these medications which have NOT CHANGED    Details   aspirin (aspirin) 325 mg tablet Take 325 mg by mouth daily. cholecalciferol, vitamin D3, (VITAMIN D3) 2,000 unit Tab Take 1 Tab by mouth daily. 2.   Follow-up Information       Follow up With Specialties Details Why Contact Info    keep follow up appointment with pcp              3.  Return to ED if worse   4. Current Discharge Medication List        START taking these medications    Details   mirtazapine (REMERON) 7.5 mg tablet Take 2 Tablets by mouth nightly for 14 days. Qty: 28 Tablet, Refills: 0  Start date: 10/28/2022, End date: 11/11/2022      nitrofurantoin, macrocrystal-monohydrate, (Macrobid) 100 mg capsule Take 1 Capsule by mouth two (2) times a day for 5 days. Qty: 10 Capsule, Refills: 0  Start date: 10/28/2022, End date: 11/2/2022             Diagnosis/Clinical Impression     Clinical Impression:   1. Medication refill    2. Insomnia, unspecified type    3. Nonintractable headache, unspecified chronicity pattern, unspecified headache type    4. Acute cystitis without hematuria        Attestations: Gisell Garner NP, am the primary clinician of record.       Please note that this dictation was completed with [employer]Arc, the [employer] voice recognition software. Quite often unanticipated grammatical, syntax, homophones, and other interpretive errors are inadvertently transcribed by the computer software. Please disregard these errors. Please excuse any errors that have escaped final proofreading. Thank you.

## 2022-10-30 LAB
SARS-COV-2, XPLCVT: NOT DETECTED
SOURCE, COVRS: NORMAL

## 2022-10-31 LAB
ATRIAL RATE: 77 BPM
CALCULATED P AXIS, ECG09: 62 DEGREES
CALCULATED R AXIS, ECG10: 9 DEGREES
CALCULATED T AXIS, ECG11: 63 DEGREES
DIAGNOSIS, 93000: NORMAL
P-R INTERVAL, ECG05: 160 MS
Q-T INTERVAL, ECG07: 384 MS
QRS DURATION, ECG06: 64 MS
QTC CALCULATION (BEZET), ECG08: 434 MS
VENTRICULAR RATE, ECG03: 77 BPM

## 2022-11-27 ENCOUNTER — APPOINTMENT (OUTPATIENT)
Dept: GENERAL RADIOLOGY | Age: 87
End: 2022-11-27
Attending: EMERGENCY MEDICINE
Payer: MEDICARE

## 2022-11-27 ENCOUNTER — HOSPITAL ENCOUNTER (EMERGENCY)
Age: 87
Discharge: HOME | End: 2022-11-27
Attending: EMERGENCY MEDICINE
Payer: MEDICARE

## 2022-11-27 VITALS
BODY MASS INDEX: 21.01 KG/M2 | DIASTOLIC BLOOD PRESSURE: 74 MMHG | HEART RATE: 75 BPM | TEMPERATURE: 98.3 F | RESPIRATION RATE: 17 BRPM | OXYGEN SATURATION: 96 % | SYSTOLIC BLOOD PRESSURE: 159 MMHG | WEIGHT: 107 LBS | HEIGHT: 60 IN

## 2022-11-27 DIAGNOSIS — R06.02 SOB (SHORTNESS OF BREATH): Primary | ICD-10-CM

## 2022-11-27 DIAGNOSIS — F41.1 ANXIETY STATE: ICD-10-CM

## 2022-11-27 DIAGNOSIS — G47.00 INSOMNIA, UNSPECIFIED TYPE: ICD-10-CM

## 2022-11-27 LAB
ALBUMIN SERPL-MCNC: 3.4 G/DL (ref 3.5–5)
ALBUMIN/GLOB SERPL: 1 {RATIO} (ref 1.1–2.2)
ALP SERPL-CCNC: 75 U/L (ref 45–117)
ALT SERPL-CCNC: 23 U/L (ref 12–78)
ANION GAP SERPL CALC-SCNC: 6 MMOL/L (ref 5–15)
AST SERPL W P-5'-P-CCNC: 23 U/L (ref 15–37)
BASOPHILS # BLD: 0 K/UL (ref 0–0.1)
BASOPHILS NFR BLD: 0 % (ref 0–1)
BILIRUB DIRECT SERPL-MCNC: 0.1 MG/DL (ref 0–0.2)
BILIRUB SERPL-MCNC: 0.4 MG/DL (ref 0.2–1)
BUN SERPL-MCNC: 35 MG/DL (ref 6–20)
BUN/CREAT SERPL: 38 (ref 12–20)
CA-I BLD-MCNC: 9.1 MG/DL (ref 8.5–10.1)
CHLORIDE SERPL-SCNC: 102 MMOL/L (ref 97–108)
CO2 SERPL-SCNC: 29 MMOL/L (ref 21–32)
CREAT SERPL-MCNC: 0.92 MG/DL (ref 0.55–1.02)
DIFFERENTIAL METHOD BLD: NORMAL
EOSINOPHIL # BLD: 0.3 K/UL (ref 0–0.4)
EOSINOPHIL NFR BLD: 4 % (ref 0–7)
ERYTHROCYTE [DISTWIDTH] IN BLOOD BY AUTOMATED COUNT: 13.3 % (ref 11.5–14.5)
GLOBULIN SER CALC-MCNC: 3.3 G/DL (ref 2–4)
GLUCOSE SERPL-MCNC: 109 MG/DL (ref 65–100)
HCT VFR BLD AUTO: 36.1 % (ref 35–47)
HGB BLD-MCNC: 11.8 G/DL (ref 11.5–16)
IMM GRANULOCYTES # BLD AUTO: 0 K/UL (ref 0–0.04)
IMM GRANULOCYTES NFR BLD AUTO: 0 % (ref 0–0.5)
LYMPHOCYTES # BLD: 2.7 K/UL (ref 0.8–3.5)
LYMPHOCYTES NFR BLD: 31 % (ref 12–49)
MCH RBC QN AUTO: 30.5 PG (ref 26–34)
MCHC RBC AUTO-ENTMCNC: 32.7 G/DL (ref 30–36.5)
MCV RBC AUTO: 93.3 FL (ref 80–99)
MONOCYTES # BLD: 0.9 K/UL (ref 0–1)
MONOCYTES NFR BLD: 11 % (ref 5–13)
NEUTS SEG # BLD: 4.6 K/UL (ref 1.8–8)
NEUTS SEG NFR BLD: 54 % (ref 32–75)
PLATELET # BLD AUTO: 173 K/UL (ref 150–400)
PMV BLD AUTO: 12.1 FL (ref 8.9–12.9)
POTASSIUM SERPL-SCNC: 4.9 MMOL/L (ref 3.5–5.1)
PROT SERPL-MCNC: 6.7 G/DL (ref 6.4–8.2)
RBC # BLD AUTO: 3.87 M/UL (ref 3.8–5.2)
SODIUM SERPL-SCNC: 137 MMOL/L (ref 136–145)
TROPONIN-HIGH SENSITIVITY: 9 NG/L (ref 0–51)
WBC # BLD AUTO: 8.5 K/UL (ref 3.6–11)

## 2022-11-27 PROCEDURE — 71045 X-RAY EXAM CHEST 1 VIEW: CPT

## 2022-11-27 PROCEDURE — 36415 COLL VENOUS BLD VENIPUNCTURE: CPT

## 2022-11-27 PROCEDURE — 84484 ASSAY OF TROPONIN QUANT: CPT

## 2022-11-27 PROCEDURE — 93005 ELECTROCARDIOGRAM TRACING: CPT

## 2022-11-27 PROCEDURE — 80048 BASIC METABOLIC PNL TOTAL CA: CPT

## 2022-11-27 PROCEDURE — 99285 EMERGENCY DEPT VISIT HI MDM: CPT

## 2022-11-27 PROCEDURE — 85025 COMPLETE CBC W/AUTO DIFF WBC: CPT

## 2022-11-27 PROCEDURE — 80076 HEPATIC FUNCTION PANEL: CPT

## 2022-11-27 RX ORDER — MIRTAZAPINE 7.5 MG/1
TABLET, FILM COATED ORAL
COMMUNITY
Start: 2022-11-23 | End: 2022-11-27 | Stop reason: SDUPTHER

## 2022-11-27 RX ORDER — MIRTAZAPINE 7.5 MG/1
7.5 TABLET, FILM COATED ORAL
Qty: 30 TABLET | Refills: 0 | Status: SHIPPED | OUTPATIENT
Start: 2022-11-27 | End: 2022-12-27

## 2022-11-27 NOTE — ED PROVIDER NOTES
EMERGENCY DEPARTMENT HISTORY AND PHYSICAL EXAM      Date: 11/27/2022  Patient Name: Kiran Ruelas    History of Presenting Illness     Chief Complaint   Patient presents with    Shortness of Breath       History Provided By: Patient granddaughter    HPI: Kiran Ruelas, 80 y.o. female   presents to the ED with cc of shortness of breath. Grandmother states patient complained of shortness of breath about an hour prior to arrival due to unable to fall asleep. Patient ran out of Remeron which she takes at nighttime for sleep with the last dose being yesterday. Patient has a history of anxiety and panic attack. Currently, patient denies shortness of breath or chest pain. No URI symptoms. No fever chills. No cough. No abdominal pain. No signs of GI bleeding. PCP: Maritza Jules MD    No current facility-administered medications on file prior to encounter. Current Outpatient Medications on File Prior to Encounter   Medication Sig Dispense Refill    [DISCONTINUED] mirtazapine (REMERON) 7.5 mg tablet       aspirin (aspirin) 325 mg tablet Take 325 mg by mouth daily. cholecalciferol, vitamin D3, (VITAMIN D3) 2,000 unit Tab Take 1 Tab by mouth daily. Past History     Past Medical History:  Past Medical History:   Diagnosis Date    Other ill-defined conditions(366.89)     Diverticulitis    Psychiatric disorder     depression       Past Surgical History:  Past Surgical History:   Procedure Laterality Date    HX GI  2010    Colon resection    HX GYN  1973    Hysterectomy    HX HEENT  2013    Laser eye surgery on left    HX ORTHOPAEDIC  1995    Left rotator cuff surgery    HX ORTHOPAEDIC      right 2nd finger       Family History:  Family History   Problem Relation Age of Onset    Lung Disease Mother     Diabetes Sister     Heart Disease Sister        Social History:  Social History     Tobacco Use    Smoking status: Former    Smokeless tobacco: Former   Substance Use Topics    Alcohol use:  No Drug use: No       Allergies: Allergies   Allergen Reactions    Penicillin G Anaphylaxis    Codeine Other (comments)     I can't walk and don't have control over my body         Review of Systems   Review of Systems   Constitutional:  Negative for chills and fever. HENT:  Negative for rhinorrhea and sore throat. Eyes:  Negative for discharge. Respiratory:  Negative for cough. Cardiovascular:  Negative for chest pain. Gastrointestinal:  Negative for abdominal pain and vomiting. Genitourinary:  Negative for dysuria. Musculoskeletal:  Negative for joint swelling. Skin:  Negative for rash. Neurological:  Negative for headaches. Psychiatric/Behavioral:  Negative for suicidal ideas. All other systems reviewed and are negative. Physical Exam   Physical Exam  Vitals and nursing note reviewed. Constitutional:       General: She is not in acute distress. Appearance: Normal appearance. She is not ill-appearing, toxic-appearing or diaphoretic. HENT:      Head: Normocephalic and atraumatic. Nose: Nose normal.      Mouth/Throat:      Mouth: Mucous membranes are moist.   Eyes:      Conjunctiva/sclera: Conjunctivae normal.   Cardiovascular:      Rate and Rhythm: Normal rate and regular rhythm. Heart sounds: Normal heart sounds. Pulmonary:      Effort: Pulmonary effort is normal.      Breath sounds: Normal breath sounds. Abdominal:      General: Abdomen is flat. Bowel sounds are normal. There is no distension. Palpations: Abdomen is soft. Tenderness: There is no abdominal tenderness. There is no guarding or rebound. Musculoskeletal:      Cervical back: Neck supple. Right lower leg: No edema. Left lower leg: No edema. Skin:     General: Skin is warm and dry. Neurological:      General: No focal deficit present. Mental Status: She is alert and oriented to person, place, and time.    Psychiatric:         Behavior: Behavior normal.         Thought Content: Thought content normal.       Diagnostic Study Results     Labs -     Recent Results (from the past 12 hour(s))   CBC WITH AUTOMATED DIFF    Collection Time: 11/27/22  1:30 AM   Result Value Ref Range    WBC 8.5 3.6 - 11.0 K/uL    RBC 3.87 3.80 - 5.20 M/uL    HGB 11.8 11.5 - 16.0 g/dL    HCT 36.1 35.0 - 47.0 %    MCV 93.3 80.0 - 99.0 FL    MCH 30.5 26.0 - 34.0 PG    MCHC 32.7 30.0 - 36.5 g/dL    RDW 13.3 11.5 - 14.5 %    PLATELET 605 036 - 452 K/uL    MPV 12.1 8.9 - 12.9 FL    NEUTROPHILS 54 32 - 75 %    LYMPHOCYTES 31 12 - 49 %    MONOCYTES 11 5 - 13 %    EOSINOPHILS 4 0 - 7 %    BASOPHILS 0 0 - 1 %    IMMATURE GRANULOCYTES 0 0.0 - 0.5 %    ABS. NEUTROPHILS 4.6 1.8 - 8.0 K/UL    ABS. LYMPHOCYTES 2.7 0.8 - 3.5 K/UL    ABS. MONOCYTES 0.9 0.0 - 1.0 K/UL    ABS. EOSINOPHILS 0.3 0.0 - 0.4 K/UL    ABS. BASOPHILS 0.0 0.0 - 0.1 K/UL    ABS. IMM. GRANS. 0.0 0.00 - 0.04 K/UL    DF AUTOMATED     METABOLIC PANEL, BASIC    Collection Time: 11/27/22  1:30 AM   Result Value Ref Range    Sodium 137 136 - 145 mmol/L    Potassium 4.9 3.5 - 5.1 mmol/L    Chloride 102 97 - 108 mmol/L    CO2 29 21 - 32 mmol/L    Anion gap 6 5 - 15 mmol/L    Glucose 109 (H) 65 - 100 mg/dL    BUN 35 (H) 6 - 20 mg/dL    Creatinine 0.92 0.55 - 1.02 mg/dL    BUN/Creatinine ratio 38 (H) 12 - 20      eGFR >60 >60 ml/min/1.73m2    Calcium 9.1 8.5 - 10.1 mg/dL   TROPONIN-HIGH SENSITIVITY    Collection Time: 11/27/22  1:30 AM   Result Value Ref Range    Troponin-High Sensitivity 9 0 - 51 ng/L   HEPATIC FUNCTION PANEL    Collection Time: 11/27/22  1:30 AM   Result Value Ref Range    Protein, total 6.7 6.4 - 8.2 g/dL    Albumin 3.4 (L) 3.5 - 5.0 g/dL    Globulin 3.3 2.0 - 4.0 g/dL    A-G Ratio 1.0 (L) 1.1 - 2.2      Bilirubin, total 0.4 0.2 - 1.0 mg/dL    Bilirubin, direct 0.1 0.0 - 0.2 mg/dL    Alk.  phosphatase 75 45 - 117 U/L    AST (SGOT) 23 15 - 37 U/L    ALT (SGPT) 23 12 - 78 U/L       Radiologic Studies -   XR CHEST PORT   Final Result   No evidence of acute cardiopulmonary process. CT Results  (Last 48 hours)      None          CXR Results  (Last 48 hours)                 11/27/22 0145  XR CHEST PORT Final result    Impression:  No evidence of acute cardiopulmonary process. Narrative:  INDICATION: Shortness of breath       COMPARISON: 10/28/2022       FINDINGS: AP portable imaging of the chest performed at 1:38 AM demonstrates a   stable cardiomediastinal silhouette. The thoracic aorta remains tortuous. The   lungs are clear bilaterally. No significant osseous abnormalities are seen. Medical Decision Making   I am the first provider for this patient. I reviewed the vital signs, available nursing notes, past medical history, past surgical history, family history and social history. Vital Signs-Reviewed the patient's vital signs. Patient Vitals for the past 12 hrs:   Temp Pulse Resp BP SpO2   11/27/22 0109 98.3 °F (36.8 °C) 75 17 (!) 159/74 96 %       Records Reviewed:     Provider Notes (Medical Decision Making):   Elderly patient complains of shortness of breath mainly due to unable to fall asleep and complains of anxiety. Patient had multiple ER visit for the similar symptoms in past.  Family states that patient ran out of Remeron for which she takes at night for sleep. Clinically patient not in respiratory distress. Nontoxic-appearing. Pulse oximetry room air is normal.  EKG is normal sinus without ischemic changes. Chest x-ray is negative. Labs are within normal limits. Patient's shortness of breath is likely due to anxiety and the refill of Remeron was prescribed. Patient was discharged in improved and stable condition. ED Course:   Initial assessment performed. The patients presenting problems have been discussed, and they are in agreement with the care plan formulated and outlined with them. I have encouraged them to ask questions as they arise throughout their visit.     ED Course as of 11/27/22 3901   Romayor Nov 27, 2022   0141 EKG normal sinus rhythm at 71 normal QRS QT normal axis no ischemic changes no STEMI [SK]      ED Course User Index  [SK] Anisa Melendez MD         PROCEDURES      Disposition: Condition stable   DC- Adult Discharges: All of the diagnostic tests were reviewed and questions answered. Diagnosis, care plan and treatment options were discussed. understand instructions and will follow up as directed. The patients results have been reviewed with them. They have been counseled regarding their diagnosis. The patient verbally convey understanding and agreement of the signs, symptoms, diagnosis, treatment and prognosis and additionally agrees to follow up as recommended. They also agree with the care-plan and convey that all of their questions have been answered. I have also put together some discharge instructions for them that include: 1) educational information regarding their diagnosis, 2) how to care for their diagnosis at home, as well a 3) list of reasons why they would want to return to the ED prior to their follow-up appointment, should their condition change. PLAN:  1. Current Discharge Medication List        CONTINUE these medications which have CHANGED    Details   mirtazapine (REMERON) 7.5 mg tablet Take 1 Tablet by mouth nightly for 30 days. Qty: 30 Tablet, Refills: 0  Start date: 11/27/2022, End date: 12/27/2022           2. Follow-up Information       Follow up With Specialties Details Why Contact Info    Follow up with your primary care physician  Schedule an appointment as soon as possible for a visit in 3 days As needed           Return to ED if worse     Diagnosis     Clinical Impression:   1. SOB (shortness of breath)    2. Anxiety state    3. Insomnia, unspecified type        Please note that this dictation was completed with [employer]ape, the [employer] voice recognition software.   Quite often unanticipated grammatical, syntax, homophones, and other interpretive errors are inadvertently transcribed by the computer software. Please disregard these errors. Please excuse any errors that have escaped final proofreading. Thank you.

## 2022-11-28 LAB
ATRIAL RATE: 71 BPM
CALCULATED P AXIS, ECG09: 64 DEGREES
CALCULATED R AXIS, ECG10: 9 DEGREES
CALCULATED T AXIS, ECG11: 53 DEGREES
DIAGNOSIS, 93000: NORMAL
P-R INTERVAL, ECG05: 178 MS
Q-T INTERVAL, ECG07: 412 MS
QRS DURATION, ECG06: 64 MS
QTC CALCULATION (BEZET), ECG08: 447 MS
VENTRICULAR RATE, ECG03: 71 BPM

## 2024-03-27 NOTE — PROGRESS NOTES
Hospitalist Progress Note    Subjective:   Daily Progress Note: 6/12/2021 2:20 PM    Hospital Course:  Pt admitted for complaints of dysphagia and dysarthria. Pt unable to swallow food or liquids for last several days, has lost significant weight. Presents with dysarthria, thickened speech and dysphagia. 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 Pt underwent an EGD , showing hiatal hernia at GE junction. Pt could not tolerate barium swallow, test was not done today, MRI with contrast did not show   Evidence of acute intracranial ischemia, mass or hemorrhage. There is mild to moderate chronic vessel ischemic changes.  Pt and family decided on pursuing a PEG tube placement for nutritional support.     Subjective: Pt seen in room,tolerating PEG feedings, no complaints    Current Facility-Administered Medications   Medication Dose Route Frequency    famotidine (PEPCID) tablet 40 mg  40 mg Per G Tube DAILY    bisacodyL (DULCOLAX) suppository 10 mg  10 mg Rectal DAILY PRN    nystatin (MYCOSTATIN) 100,000 unit/mL oral suspension 500,000 Units  500,000 Units Oral QID    amLODIPine (NORVASC) tablet 5 mg  5 mg Oral DAILY    aspirin tablet 325 mg  325 mg Oral DAILY    cholecalciferol (VITAMIN D3) (1000 Units /25 mcg) tablet 1,000 Units  1,000 Units Oral DAILY    hydrOXYzine pamoate (VISTARIL) capsule 25 mg  25 mg Oral TID PRN    losartan (COZAAR) tablet 50 mg  50 mg Oral DAILY    hydrALAZINE (APRESOLINE) 20 mg/mL injection 10 mg  10 mg IntraVENous Q6H PRN    sodium chloride (NS) flush 5-40 mL  5-40 mL IntraVENous Q8H    sodium chloride (NS) flush 5-40 mL  5-40 mL IntraVENous PRN    acetaminophen (TYLENOL) tablet 650 mg  650 mg Oral Q6H PRN    Or    acetaminophen (TYLENOL) suppository 650 mg  650 mg Rectal Q6H PRN    polyethylene glycol (MIRALAX) packet 17 g  17 g Oral DAILY PRN    ondansetron (ZOFRAN ODT) tablet 4 mg  4 mg Oral Q8H PRN    Or    Patient arrived to PACU in stable condition. Medicated with oral Tramadol and IV Robaxin. Patient repositioned for comfort. On Q pump verified and infusing, Ice pack to incision site. Patient is resting and doing well.    ondansetron (ZOFRAN) injection 4 mg  4 mg IntraVENous Q6H PRN    enoxaparin (LOVENOX) injection 30 mg  30 mg SubCUTAneous DAILY        Review of Systems:    Review of Systems   Constitutional: Negative for chills and fever. Respiratory: Negative for cough and sputum production. Cardiovascular: Negative for chest pain and palpitations. Gastrointestinal: Negative for heartburn, nausea and vomiting. Neurological: Negative for dizziness and headaches. Objective:     Visit Vitals  BP (!) 162/80   Pulse 83   Temp 97.8 °F (36.6 °C)   Resp 16   Ht 5' (1.524 m)   Wt 44.5 kg (98 lb 1.7 oz)   SpO2 96%   BMI 19.16 kg/m²    O2 Flow Rate (L/min): 10 l/min O2 Device: None (Room air)    Temp (24hrs), Av.1 °F (36.7 °C), Min:97.5 °F (36.4 °C), Max:98.6 °F (37 °C)      No intake/output data recorded. 06/10 1901 -  0700  In: -   Out: 500 [Urine:500]    PHYSICAL EXAM:    Physical Exam   Constitutional:       General: She is not in acute distress. Comments: thin   HENT:      Mouth/Throat:      Mouth: Mucous membranes are dry. Comments: Slightly thickened tongue  Cardiovascular:      Rate and Rhythm: Normal rate and regular rhythm. Heart sounds: Murmur heard. Pulmonary:      Effort: Pulmonary effort is normal.      Breath sounds: Normal breath sounds. Musculoskeletal:         General: Normal range of motion. Skin:     General: Skin is warm and dry. Neurological:      Mental Status: She is alert and oriented to person, place, and time.       Comments: Dysarthia, dysphagia   Psychiatric:      Comments: calm, cooperative    Data Review    Recent Results (from the past 24 hour(s))   GLUCOSE, POC    Collection Time: 21  9:10 AM   Result Value Ref Range    Glucose (POC) 135 (H) 65 - 117 mg/dL    Performed by Katherine Patel, POC    Collection Time: 21 10:53 AM   Result Value Ref Range    Glucose (POC) 200 (H) 65 - 117 mg/dL    Performed by Shadia Roberto        MRI BRAIN W WO CONT   Final Result   1. No evidence of acute intracranial ischemia, mass or hemorrhage. 2.  Mild to moderate chronic small vessel ischemic changes. 3.  No abnormal parenchymal enhancement or suspicious cranial nerve enhancement   identified. Please note study was not optimized for evaluation of the posterior   fossa or cranial nerves. MRI BRAIN WO CONT   Final Result   1. No acute finding. 2. Mild changes of suspected small vessel ischemic disease with diffuse cerebral   atrophy. 3. 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 CT HEAD WO CONT   Final Result   1. No acute intracranial findings. 2. Atrophy and small vessel ischemic disease. Active Problems:    Dysphagia (6/7/2021)      Dysarthria (6/8/2021)        Assessment/Plan:     1. 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 Neurology following, MG panel, and RPR, SPEP, UPEP and FL chains are pending, will f/u with neurology after dc for EMG test     2. Hypertension- restart medications per PEG tube.      3. Hx of falls- OT/PT, OOB with assistance.     4. Severe protein malnutrition- secondary to dysphagia, follow TF recs per nutrition  Scheduled for PEG placement today.       5. Discharge plan- SNF accepted, planned for Monday.       DVT Prophylaxis: lovneox  Code Status:  DNR  POA: Crystal Favret     [facility] CHAVAMOJGAN discussed with:   ____patient, staff nurse___________________________________________________________    Reillysheridan Joshi NP

## (undated) DEVICE — ENDO KIT 1: Brand: MEDLINE INDUSTRIES, INC.

## (undated) DEVICE — MOUTHPIECE ENDOSCP 20X27MM --

## (undated) DEVICE — CANNULA NSL O2 AD 7 FT END-TIDAL CARBON DIOX VENTFLO

## (undated) DEVICE — KIT PEG 24FR SIL STD PUL RETRV SNR RND AND T BAR EXT BOLST

## (undated) DEVICE — THE ENDO CARRY-ON PROCEDURE KIT CONTAINS ALL OF THE SUPPLIES AND INFECTION PREVENTION PRODUCTS NEEDED FOR ENDOSCOPIC PROCEDURES: Brand: ENDO CARRY-ON PROCEDURE KIT